# Patient Record
Sex: MALE | Race: WHITE | NOT HISPANIC OR LATINO | Employment: OTHER | URBAN - METROPOLITAN AREA
[De-identification: names, ages, dates, MRNs, and addresses within clinical notes are randomized per-mention and may not be internally consistent; named-entity substitution may affect disease eponyms.]

---

## 2017-02-14 RX ORDER — SIMVASTATIN 40 MG
TABLET ORAL
Qty: 90 TABLET | Refills: 0 | Status: SHIPPED | OUTPATIENT
Start: 2017-02-14 | End: 2017-05-12 | Stop reason: SDUPTHER

## 2017-02-22 DIAGNOSIS — I10 HTN (HYPERTENSION), BENIGN: ICD-10-CM

## 2017-02-22 RX ORDER — HYDROCHLOROTHIAZIDE 12.5 MG/1
TABLET ORAL
Qty: 90 TABLET | Refills: 0 | Status: SHIPPED | OUTPATIENT
Start: 2017-02-22 | End: 2017-05-21 | Stop reason: SDUPTHER

## 2017-02-22 RX ORDER — LOSARTAN POTASSIUM 100 MG/1
TABLET ORAL
Qty: 90 TABLET | Refills: 0 | Status: SHIPPED | OUTPATIENT
Start: 2017-02-22 | End: 2017-05-21 | Stop reason: SDUPTHER

## 2017-03-20 ENCOUNTER — OFFICE VISIT (OUTPATIENT)
Dept: INTERNAL MEDICINE | Age: 67
End: 2017-03-20

## 2017-03-20 VITALS
HEART RATE: 72 BPM | HEIGHT: 70 IN | BODY MASS INDEX: 26.92 KG/M2 | RESPIRATION RATE: 12 BRPM | SYSTOLIC BLOOD PRESSURE: 120 MMHG | DIASTOLIC BLOOD PRESSURE: 76 MMHG | WEIGHT: 188 LBS

## 2017-03-20 DIAGNOSIS — E11.9 TYPE 2 DIABETES MELLITUS WITHOUT COMPLICATION, WITHOUT LONG-TERM CURRENT USE OF INSULIN (HCC): ICD-10-CM

## 2017-03-20 DIAGNOSIS — E78.2 MIXED HYPERLIPIDEMIA: ICD-10-CM

## 2017-03-20 DIAGNOSIS — I10 BENIGN ESSENTIAL HYPERTENSION: Primary | ICD-10-CM

## 2017-03-20 LAB
ALBUMIN SERPL-MCNC: 4.6 G/DL (ref 3.5–5.2)
ALBUMIN/GLOB SERPL: 2.1 G/DL
ALP SERPL-CCNC: 66 U/L (ref 39–117)
ALT SERPL-CCNC: 18 U/L (ref 1–41)
AST SERPL-CCNC: 20 U/L (ref 1–40)
BILIRUB SERPL-MCNC: 0.3 MG/DL (ref 0.1–1.2)
BUN SERPL-MCNC: 20 MG/DL (ref 8–23)
BUN/CREAT SERPL: 20.2 (ref 7–25)
CALCIUM SERPL-MCNC: 10.1 MG/DL (ref 8.6–10.5)
CHLORIDE SERPL-SCNC: 103 MMOL/L (ref 98–107)
CHOLEST SERPL-MCNC: 190 MG/DL (ref 0–200)
CO2 SERPL-SCNC: 23.5 MMOL/L (ref 22–29)
CREAT SERPL-MCNC: 0.99 MG/DL (ref 0.76–1.27)
GLOBULIN SER CALC-MCNC: 2.2 GM/DL
GLUCOSE SERPL-MCNC: 115 MG/DL (ref 65–99)
HBA1C MFR BLD: 5.9 % (ref 4.8–5.6)
HDLC SERPL-MCNC: 53 MG/DL (ref 40–60)
LDLC SERPL CALC-MCNC: 117 MG/DL (ref 0–100)
POTASSIUM SERPL-SCNC: 4.3 MMOL/L (ref 3.5–5.2)
PROT SERPL-MCNC: 6.8 G/DL (ref 6–8.5)
SODIUM SERPL-SCNC: 142 MMOL/L (ref 136–145)
TRIGL SERPL-MCNC: 102 MG/DL (ref 0–150)
VLDLC SERPL CALC-MCNC: 20.4 MG/DL (ref 5–40)

## 2017-03-20 PROCEDURE — 99214 OFFICE O/P EST MOD 30 MIN: CPT | Performed by: INTERNAL MEDICINE

## 2017-03-20 NOTE — PROGRESS NOTES
Jeremi Shankar  is a 66 y.o. male who presents with   Chief Complaint   Patient presents with   • Hypertension     Checkup; lab updates   • Hyperlipidemia     As above   • Diabetes     As above   • Hands tingly     Unrelated reason for today's visit: Both hands feel tingly.   .    Hypertension   This is a chronic problem. The current episode started more than 1 year ago. The problem is controlled. Past treatments include diuretics and angiotensin blockers. There are no compliance problems.    Hyperlipidemia   This is a chronic problem. The current episode started more than 1 year ago. The problem is controlled. Recent lipid tests were reviewed and are normal. Current antihyperlipidemic treatment includes statins. The current treatment provides moderate improvement of lipids. There are no compliance problems.    Diabetes   He presents for his follow-up diabetic visit. He has type 2 diabetes mellitus. His disease course has been stable. There are no hypoglycemic complications. Symptoms are stable. There are no diabetic complications. When asked about current treatments, none were reported.      Hands tingly: This is in both hands.  At this point in time he does not wish to pursue a workup.    The following portions of the patient's history were reviewed and updated as appropriate: past family history, past social history and past surgical history.    Review of Systems   Constitutional: Negative.    HENT: Negative.    Eyes: Negative.    Respiratory: Negative.    Cardiovascular: Negative.    Genitourinary: Negative.    Musculoskeletal: Negative.    Skin: Negative.    Neurological:        Subjective complaining of tingling in both hands as noted.   Psychiatric/Behavioral: Negative.        Objective   Physical Exam   Constitutional: He is oriented to person, place, and time. He appears well-developed and well-nourished. No distress.   HENT:   Head: Normocephalic and atraumatic.   Eyes: Conjunctivae and EOM are  normal. Pupils are equal, round, and reactive to light.   Neck: Normal range of motion. Neck supple. No thyromegaly present.   Neck exam negative.  Carotid auscultation normal-no bruits heard.   Cardiovascular: Normal rate, regular rhythm, normal heart sounds and intact distal pulses.  Exam reveals no gallop and no friction rub.    No murmur heard.  Pulmonary/Chest: Effort normal and breath sounds normal. No respiratory distress. He has no wheezes. He has no rales. He exhibits no tenderness.   Neurological: He is alert and oriented to person, place, and time. He has normal reflexes. He displays normal reflexes. No cranial nerve deficit. Coordination normal.   Psychiatric: He has a normal mood and affect. His behavior is normal. Judgment and thought content normal.   Nursing note and vitals reviewed.      Assessment/Plan   Jeremi was seen today for hypertension, hyperlipidemia, diabetes and hands tingly.    Diagnoses and all orders for this visit:    Benign essential hypertension  -     Comprehensive Metabolic Panel    Mixed hyperlipidemia  -     Comprehensive Metabolic Panel  -     Lipid Panel    Type 2 diabetes mellitus without complication, without long-term current use of insulin  -     Comprehensive Metabolic Panel  -     Hemoglobin A1c      Plan: Labs as above.Today's exam unremarkable for any new problems or events.  Continue all current treatment as prescribed.  A follow-up checkup/lab update visit is advised for 6 months.    Hands tingly: Possible carpal tunnel syndrome based on his history alone.  We will try vitamin B2-2 every morning and 2 every evening; vitamin B6-1 every morning and one every evening.  EMG advised if symptoms persist.

## 2017-05-12 RX ORDER — SIMVASTATIN 40 MG
TABLET ORAL
Qty: 90 TABLET | Refills: 0 | Status: SHIPPED | OUTPATIENT
Start: 2017-05-12 | End: 2017-08-09 | Stop reason: SDUPTHER

## 2017-05-21 DIAGNOSIS — I10 HTN (HYPERTENSION), BENIGN: ICD-10-CM

## 2017-05-22 RX ORDER — LOSARTAN POTASSIUM 100 MG/1
TABLET ORAL
Qty: 90 TABLET | Refills: 0 | Status: SHIPPED | OUTPATIENT
Start: 2017-05-22 | End: 2017-08-19 | Stop reason: SDUPTHER

## 2017-05-22 RX ORDER — HYDROCHLOROTHIAZIDE 12.5 MG/1
TABLET ORAL
Qty: 90 TABLET | Refills: 0 | Status: SHIPPED | OUTPATIENT
Start: 2017-05-22 | End: 2017-08-19 | Stop reason: SDUPTHER

## 2017-08-09 RX ORDER — SIMVASTATIN 40 MG
TABLET ORAL
Qty: 90 TABLET | Refills: 0 | Status: SHIPPED | OUTPATIENT
Start: 2017-08-09 | End: 2017-11-06 | Stop reason: SDUPTHER

## 2017-08-19 DIAGNOSIS — I10 HTN (HYPERTENSION), BENIGN: ICD-10-CM

## 2017-08-21 RX ORDER — HYDROCHLOROTHIAZIDE 12.5 MG/1
TABLET ORAL
Qty: 90 TABLET | Refills: 0 | Status: SHIPPED | OUTPATIENT
Start: 2017-08-21 | End: 2017-11-17 | Stop reason: SDUPTHER

## 2017-08-21 RX ORDER — LOSARTAN POTASSIUM 100 MG/1
TABLET ORAL
Qty: 90 TABLET | Refills: 0 | Status: SHIPPED | OUTPATIENT
Start: 2017-08-21 | End: 2017-11-17 | Stop reason: SDUPTHER

## 2017-09-20 ENCOUNTER — OFFICE VISIT (OUTPATIENT)
Dept: INTERNAL MEDICINE | Age: 67
End: 2017-09-20

## 2017-09-20 VITALS
WEIGHT: 186 LBS | DIASTOLIC BLOOD PRESSURE: 68 MMHG | RESPIRATION RATE: 12 BRPM | HEIGHT: 70 IN | BODY MASS INDEX: 26.63 KG/M2 | HEART RATE: 72 BPM | SYSTOLIC BLOOD PRESSURE: 130 MMHG

## 2017-09-20 DIAGNOSIS — E78.2 MIXED HYPERLIPIDEMIA: ICD-10-CM

## 2017-09-20 DIAGNOSIS — R35.1 NOCTURIA: ICD-10-CM

## 2017-09-20 DIAGNOSIS — IMO0001 PARESTHESIAS/NUMBNESS: ICD-10-CM

## 2017-09-20 DIAGNOSIS — I10 BENIGN ESSENTIAL HYPERTENSION: Primary | ICD-10-CM

## 2017-09-20 DIAGNOSIS — E08.42 DIABETES MELLITUS DUE TO UNDERLYING CONDITION WITH DIABETIC POLYNEUROPATHY, WITHOUT LONG-TERM CURRENT USE OF INSULIN (HCC): ICD-10-CM

## 2017-09-20 DIAGNOSIS — R73.9 HYPERGLYCEMIA: ICD-10-CM

## 2017-09-20 LAB
ALBUMIN SERPL-MCNC: 4.8 G/DL (ref 3.5–5.2)
ALBUMIN/GLOB SERPL: 2.2 G/DL
ALP SERPL-CCNC: 65 U/L (ref 39–117)
ALT SERPL-CCNC: 26 U/L (ref 1–41)
AST SERPL-CCNC: 27 U/L (ref 1–40)
BILIRUB SERPL-MCNC: 0.5 MG/DL (ref 0.1–1.2)
BUN SERPL-MCNC: 20 MG/DL (ref 8–23)
BUN/CREAT SERPL: 19 (ref 7–25)
CALCIUM SERPL-MCNC: 10.1 MG/DL (ref 8.6–10.5)
CHLORIDE SERPL-SCNC: 99 MMOL/L (ref 98–107)
CHOLEST SERPL-MCNC: 193 MG/DL (ref 0–200)
CO2 SERPL-SCNC: 27.5 MMOL/L (ref 22–29)
CREAT SERPL-MCNC: 1.05 MG/DL (ref 0.76–1.27)
GLOBULIN SER CALC-MCNC: 2.2 GM/DL
GLUCOSE SERPL-MCNC: 109 MG/DL (ref 65–99)
HBA1C MFR BLD: 5.9 % (ref 4.8–5.6)
HDLC SERPL-MCNC: 52 MG/DL (ref 40–60)
LDLC SERPL CALC-MCNC: 111 MG/DL (ref 0–100)
POTASSIUM SERPL-SCNC: 4.2 MMOL/L (ref 3.5–5.2)
PROT SERPL-MCNC: 7 G/DL (ref 6–8.5)
PSA SERPL-MCNC: 1.03 NG/ML (ref 0–4)
SODIUM SERPL-SCNC: 140 MMOL/L (ref 136–145)
TRIGL SERPL-MCNC: 149 MG/DL (ref 0–150)
VLDLC SERPL CALC-MCNC: 29.8 MG/DL (ref 5–40)

## 2017-09-20 PROCEDURE — 99214 OFFICE O/P EST MOD 30 MIN: CPT | Performed by: INTERNAL MEDICINE

## 2017-09-20 NOTE — PROGRESS NOTES
Jeremi Shankar  is a 66 y.o. male who presents with   Chief Complaint   Patient presents with   • Hypertension     Checkup; lab updates   • Hyperlipidemia     As above   • Blood Sugar Problem     Blood sugar 115; A1c March 2017.  Currently on no blood sugar medications.   • Nocturia     Unrelated reason for today's visit: Nighttime urinary frequency with slow stream.  No fever, chills or dysuria.   • Numb/tingly hands     Unrelated reason for today's visit: Number/tingly hands for the past year.  He says his hands are sore and he gets tingling in his hands with usage or while asleep.   .    Hypertension   This is a chronic problem. The current episode started more than 1 year ago. The problem is controlled. Past treatments include diuretics and angiotensin blockers. The current treatment provides moderate improvement. There are no compliance problems.    Hyperlipidemia   This is a chronic problem. The current episode started more than 1 year ago. The problem is controlled. Exacerbating diseases include diabetes. Current antihyperlipidemic treatment includes statins. The current treatment provides moderate improvement of lipids. There are no compliance problems.    Blood Sugar Problem   This is a chronic problem. The current episode started more than 1 year ago. The problem has been waxing and waning. Associated symptoms include joint swelling and numbness. He has tried nothing for the symptoms.        The following portions of the patient's history were reviewed and updated as appropriate: allergies, current medications, past medical history and problem list.    Review of Systems   Constitutional: Negative.    HENT: Negative.    Eyes: Negative.    Respiratory: Negative.    Cardiovascular: Negative.    Genitourinary: Negative.    Musculoskeletal: Positive for joint swelling.        Joint swelling of the knuckles of his hands suggesting osteoarthritis.   Skin: Negative.    Neurological: Positive for  numbness.        Tingling of hands as per history.   Psychiatric/Behavioral: Negative.        Objective   Physical Exam   Constitutional: He is oriented to person, place, and time. He appears well-developed and well-nourished. No distress.   HENT:   Head: Normocephalic and atraumatic.   Eyes: Conjunctivae and EOM are normal. Pupils are equal, round, and reactive to light.   Neck: Normal range of motion. Neck supple. No thyromegaly present.   Neck exam negative.  Carotid auscultation normal-no bruits heard.   Cardiovascular: Normal rate, regular rhythm, normal heart sounds and intact distal pulses.  Exam reveals no gallop and no friction rub.    No murmur heard.  Pulmonary/Chest: Effort normal and breath sounds normal. No respiratory distress. He has no wheezes. He has no rales. He exhibits no tenderness.   Musculoskeletal:   Patient with bilateral Heberden's and Jim's nodes of his hands suggesting the likelihood of osteoarthritis.  He also has bilateral Dupuytren's contractures of both ring fingers that appears to be mild at present time.   Neurological: He is alert and oriented to person, place, and time.   The patient has positive Tinel's and Phalen's signs at both wrists suggesting the possibility of carpal tunnel syndrome bilaterally.   Psychiatric: He has a normal mood and affect. His behavior is normal. Judgment and thought content normal.   Nursing note and vitals reviewed.      Assessment/Plan   Jeremi was seen today for hypertension, hyperlipidemia, blood sugar problem, nocturia and numb/tingly hands.    Diagnoses and all orders for this visit:    Benign essential hypertension  -     Comprehensive Metabolic Panel    Mixed hyperlipidemia  -     Comprehensive Metabolic Panel  -     Lipid Panel    Hyperglycemia  -     Comprehensive Metabolic Panel  -     Hemoglobin A1c    Nocturia  -     PSA    Paresthesias/numbness  -     EMG 2 Limbs; Future    Diabetes mellitus due to underlying condition with diabetic  "polyneuropathy, without long-term current use of insulin   -     EMG 2 Limbs; Future      Plan: Labs as above for the issues as outlined.  Continue current treatment.  Tentative plans for a six-month checkup/lab update visit.    Paresthesias-hands: Possible carpal tunnel.  We'll schedule EMG.  Patient does have a history of hyperglycemia and borderline diabetes (\"pre-diabetes \").  Also bilateral Dupuytren's contractures as noted.  He will try vitamin B2 and B6-2 B-2 in the morning and evening and one B 6 in the morning and evening.           "

## 2017-09-28 ENCOUNTER — HOSPITAL ENCOUNTER (OUTPATIENT)
Dept: INFUSION THERAPY | Facility: HOSPITAL | Age: 67
Discharge: HOME | End: 2017-09-28
Attending: PSYCHIATRY & NEUROLOGY | Admitting: PSYCHIATRY & NEUROLOGY

## 2017-09-28 DIAGNOSIS — E08.42 DIABETES MELLITUS DUE TO UNDERLYING CONDITION WITH DIABETIC POLYNEUROPATHY, WITHOUT LONG-TERM CURRENT USE OF INSULIN (HCC): ICD-10-CM

## 2017-09-28 DIAGNOSIS — IMO0001 PARESTHESIAS/NUMBNESS: ICD-10-CM

## 2017-09-28 PROCEDURE — 95910 NRV CNDJ TEST 7-8 STUDIES: CPT | Performed by: PSYCHIATRY & NEUROLOGY

## 2017-09-28 PROCEDURE — 95910 NRV CNDJ TEST 7-8 STUDIES: CPT

## 2017-11-06 RX ORDER — SIMVASTATIN 40 MG
TABLET ORAL
Qty: 90 TABLET | Refills: 1 | Status: SHIPPED | OUTPATIENT
Start: 2017-11-06 | End: 2018-05-05 | Stop reason: SDUPTHER

## 2017-11-17 DIAGNOSIS — I10 HTN (HYPERTENSION), BENIGN: ICD-10-CM

## 2017-11-17 RX ORDER — LOSARTAN POTASSIUM 100 MG/1
TABLET ORAL
Qty: 90 TABLET | Refills: 0 | Status: SHIPPED | OUTPATIENT
Start: 2017-11-17 | End: 2018-02-13 | Stop reason: SDUPTHER

## 2017-11-17 RX ORDER — HYDROCHLOROTHIAZIDE 12.5 MG/1
TABLET ORAL
Qty: 90 TABLET | Refills: 0 | Status: SHIPPED | OUTPATIENT
Start: 2017-11-17 | End: 2018-02-13 | Stop reason: SDUPTHER

## 2018-02-13 DIAGNOSIS — I10 HTN (HYPERTENSION), BENIGN: ICD-10-CM

## 2018-02-13 RX ORDER — LOSARTAN POTASSIUM 100 MG/1
TABLET ORAL
Qty: 90 TABLET | Refills: 0 | Status: SHIPPED | OUTPATIENT
Start: 2018-02-13 | End: 2018-05-11 | Stop reason: SDUPTHER

## 2018-02-13 RX ORDER — HYDROCHLOROTHIAZIDE 12.5 MG/1
TABLET ORAL
Qty: 90 TABLET | Refills: 0 | Status: SHIPPED | OUTPATIENT
Start: 2018-02-13 | End: 2018-05-11 | Stop reason: SDUPTHER

## 2018-02-27 ENCOUNTER — TELEPHONE (OUTPATIENT)
Dept: INTERNAL MEDICINE | Age: 68
End: 2018-02-27

## 2018-02-27 NOTE — TELEPHONE ENCOUNTER
"Generally speaking if the \"cyst has pus in it\" than before anybody removes it and antibiotic generally is needed to clear up any infection that might be there.  Under the circumstances it probably would be best if I see at first before making a referral.  "

## 2018-02-27 NOTE — TELEPHONE ENCOUNTER
Pt called asking for a referral to dermatologist. Pt said he brought it to Dr Jacobs's attention about a year ago, of a bump/ cyst on back that has pus in it and he wants to have it removed. Pt asked if he would refer him, or does he need to be seen again?  Pt's # [phone]  Thanks SP

## 2018-02-27 NOTE — TELEPHONE ENCOUNTER
Pt called back & stated he will wait until his 6 months appt that is scheduled for Tuesday 3/13/18 to have the cyst evaluated.

## 2018-02-28 ENCOUNTER — OFFICE VISIT (OUTPATIENT)
Dept: INTERNAL MEDICINE | Age: 68
End: 2018-02-28

## 2018-02-28 VITALS
RESPIRATION RATE: 13 BRPM | WEIGHT: 189 LBS | BODY MASS INDEX: 27.06 KG/M2 | TEMPERATURE: 97 F | HEART RATE: 70 BPM | HEIGHT: 70 IN | OXYGEN SATURATION: 96 % | DIASTOLIC BLOOD PRESSURE: 70 MMHG | SYSTOLIC BLOOD PRESSURE: 120 MMHG

## 2018-02-28 DIAGNOSIS — I10 BENIGN ESSENTIAL HYPERTENSION: Primary | ICD-10-CM

## 2018-02-28 DIAGNOSIS — E78.2 MIXED HYPERLIPIDEMIA: ICD-10-CM

## 2018-02-28 DIAGNOSIS — L72.3 SEBACEOUS CYST: ICD-10-CM

## 2018-02-28 DIAGNOSIS — R73.9 HYPERGLYCEMIA: ICD-10-CM

## 2018-02-28 PROCEDURE — 99214 OFFICE O/P EST MOD 30 MIN: CPT | Performed by: INTERNAL MEDICINE

## 2018-02-28 NOTE — PROGRESS NOTES
"  Jeremi Shankar . is a 67 y.o. male who presents with   Chief Complaint   Patient presents with   • Hypertension     Checkup; lab updates   • Hyperlipidemia     As above   • Blood Sugar Problem     As above.  Currently on no oral hypoglycemic agents.   • \" Cyst on my back\"     As noted.  1.5 cm sebaceous cyst on the left lower back in the lumbar area.   .    Hypertension   This is a chronic problem. The current episode started more than 1 year ago. The problem is controlled. Past treatments include diuretics and angiotensin blockers. The current treatment provides moderate improvement. There are no compliance problems.    Hyperlipidemia   This is a chronic problem. The current episode started more than 1 year ago. The problem is controlled. Exacerbating diseases include diabetes. Current antihyperlipidemic treatment includes statins. The current treatment provides moderate improvement of lipids. There are no compliance problems.    Blood Sugar Problem   This is a chronic problem. The current episode started more than 1 year ago. The problem has been waxing and waning. He has tried nothing for the symptoms.      \" Cyst on my back\": History as outlined above.    The following portions of the patient's history were reviewed and updated as appropriate: allergies, current medications, past medical history and problem list.    Review of Systems   Constitutional: Negative.    HENT: Negative.    Eyes: Negative.    Respiratory: Negative.    Cardiovascular: Negative.    Genitourinary: Negative.    Musculoskeletal: Negative.    Skin: Negative.         Cyst on the lower back as noted from the history   Neurological: Negative.    Psychiatric/Behavioral: Negative.        Objective   Physical Exam   Constitutional: He is oriented to person, place, and time. He appears well-developed and well-nourished. No distress.   HENT:   Head: Normocephalic and atraumatic.   Eyes: Conjunctivae and EOM are normal. Pupils are equal, " "round, and reactive to light.   Neck: Normal range of motion. Neck supple. No thyromegaly present.   Neck exam negative.  Carotid auscultation normal-no bruits heard.   Cardiovascular: Normal rate, regular rhythm, normal heart sounds and intact distal pulses.  Exam reveals no gallop and no friction rub.    No murmur heard.  Pulmonary/Chest: Effort normal and breath sounds normal. No respiratory distress. He has no wheezes. He has no rales. He exhibits no tenderness.   Neurological: He is alert and oriented to person, place, and time.   Skin:   1.5 cm noninfected sebaceous cyst of the left lower back/lumbar area   Psychiatric: He has a normal mood and affect. His behavior is normal. Judgment and thought content normal.   Nursing note and vitals reviewed.      Assessment/Plan   Jeremi was seen today for hypertension, hyperlipidemia, blood sugar problem and \" cyst on my back\".    Diagnoses and all orders for this visit:    Benign essential hypertension  -     Comprehensive Metabolic Panel    Mixed hyperlipidemia  -     Comprehensive Metabolic Panel  -     Lipid Panel    Hyperglycemia  -     Comprehensive Metabolic Panel  -     Hemoglobin A1c    Sebaceous cyst  -     Ambulatory Referral to General Surgery      Plan: Labs as above for the issues as outlined.  Tentatively we'll plan on a six-month checkup/lab update visit.    Surgical referral to Dr. Ye Negron.  For removal of cyst per request of the patient to have it removed.         "

## 2018-03-01 LAB
ALBUMIN SERPL-MCNC: 4.9 G/DL (ref 3.5–5.2)
ALBUMIN/GLOB SERPL: 1.8 G/DL
ALP SERPL-CCNC: 71 U/L (ref 39–117)
ALT SERPL-CCNC: 20 U/L (ref 1–41)
AST SERPL-CCNC: 24 U/L (ref 1–40)
BILIRUB SERPL-MCNC: 0.5 MG/DL (ref 0.1–1.2)
BUN SERPL-MCNC: 18 MG/DL (ref 8–23)
BUN/CREAT SERPL: 18.4 (ref 7–25)
CALCIUM SERPL-MCNC: 10 MG/DL (ref 8.6–10.5)
CHLORIDE SERPL-SCNC: 99 MMOL/L (ref 98–107)
CHOLEST SERPL-MCNC: 180 MG/DL (ref 0–200)
CO2 SERPL-SCNC: 27.8 MMOL/L (ref 22–29)
CREAT SERPL-MCNC: 0.98 MG/DL (ref 0.76–1.27)
GFR SERPLBLD CREATININE-BSD FMLA CKD-EPI: 76 ML/MIN/1.73
GFR SERPLBLD CREATININE-BSD FMLA CKD-EPI: 92 ML/MIN/1.73
GLOBULIN SER CALC-MCNC: 2.7 GM/DL
GLUCOSE SERPL-MCNC: 98 MG/DL (ref 65–99)
HBA1C MFR BLD: 5.8 % (ref 4.8–5.6)
HDLC SERPL-MCNC: 57 MG/DL (ref 40–60)
LDLC SERPL CALC-MCNC: 100 MG/DL (ref 0–100)
POTASSIUM SERPL-SCNC: 4.5 MMOL/L (ref 3.5–5.2)
PROT SERPL-MCNC: 7.6 G/DL (ref 6–8.5)
SODIUM SERPL-SCNC: 142 MMOL/L (ref 136–145)
TRIGL SERPL-MCNC: 114 MG/DL (ref 0–150)
VLDLC SERPL CALC-MCNC: 22.8 MG/DL (ref 5–40)

## 2018-04-16 ENCOUNTER — TELEPHONE (OUTPATIENT)
Dept: INTERNAL MEDICINE | Age: 68
End: 2018-04-16

## 2018-04-16 RX ORDER — HYDROCORTISONE ACETATE 25 MG/1
25 SUPPOSITORY RECTAL 2 TIMES DAILY PRN
Qty: 12 EACH | Refills: 1 | Status: SHIPPED | OUTPATIENT
Start: 2018-04-16 | End: 2018-08-28

## 2018-04-16 RX ORDER — HYDROCORTISONE ACETATE 25 MG/1
25 SUPPOSITORY RECTAL 2 TIMES DAILY PRN
Qty: 12 EACH | Refills: 1 | Status: SHIPPED | OUTPATIENT
Start: 2018-04-16 | End: 2018-04-16

## 2018-04-16 RX ORDER — HYDROCORTISONE ACETATE 25 MG/1
25 SUPPOSITORY RECTAL 2 TIMES DAILY PRN
Qty: 12 EACH | Refills: 1 | Status: SHIPPED | OUTPATIENT
Start: 2018-04-16 | End: 2018-04-16 | Stop reason: SDUPTHER

## 2018-04-16 NOTE — TELEPHONE ENCOUNTER
Patient thinks he is having problems with Hemorids and would like for you to call him back. He would not tell me anymore than that.

## 2018-04-16 NOTE — TELEPHONE ENCOUNTER
Sent him a prescription for hydrocortisone rectal suppositories 25 mg number 10-1 refill.  Insert 1 twice a day into the rectum.

## 2018-04-16 NOTE — TELEPHONE ENCOUNTER
Pt called back to let us know that the suppositories are not covered under his insurance they said we could send in proctosol cream is it ok to change to this?

## 2018-04-17 ENCOUNTER — TELEPHONE (OUTPATIENT)
Dept: INTERNAL MEDICINE | Age: 68
End: 2018-04-17

## 2018-04-17 NOTE — TELEPHONE ENCOUNTER
PLEASE SEE NOTE BELOW AND ADVISE. LAST MEDICATION CALLED IN WAS NOT COVERED BY PATIENTS INSURANCE.

## 2018-04-17 NOTE — TELEPHONE ENCOUNTER
Pt called stating his [facility] pharmacy had not received the new medication for hemorrhoid Proctosol cream.  Pt's # [phone]  Thanks SP

## 2018-05-07 RX ORDER — SIMVASTATIN 40 MG
TABLET ORAL
Qty: 90 TABLET | Refills: 1 | Status: SHIPPED | OUTPATIENT
Start: 2018-05-07 | End: 2018-10-28 | Stop reason: SDUPTHER

## 2018-05-11 DIAGNOSIS — I10 HTN (HYPERTENSION), BENIGN: ICD-10-CM

## 2018-05-11 RX ORDER — LOSARTAN POTASSIUM 100 MG/1
TABLET ORAL
Qty: 90 TABLET | Refills: 0 | Status: SHIPPED | OUTPATIENT
Start: 2018-05-11 | End: 2018-08-10 | Stop reason: SDUPTHER

## 2018-05-11 RX ORDER — HYDROCHLOROTHIAZIDE 12.5 MG/1
TABLET ORAL
Qty: 90 TABLET | Refills: 0 | Status: SHIPPED | OUTPATIENT
Start: 2018-05-11 | End: 2018-08-10 | Stop reason: SDUPTHER

## 2018-08-10 DIAGNOSIS — I10 HTN (HYPERTENSION), BENIGN: ICD-10-CM

## 2018-08-10 RX ORDER — LOSARTAN POTASSIUM 100 MG/1
TABLET ORAL
Qty: 90 TABLET | Refills: 0 | Status: SHIPPED | OUTPATIENT
Start: 2018-08-10 | End: 2018-09-24 | Stop reason: SDUPTHER

## 2018-08-10 RX ORDER — HYDROCHLOROTHIAZIDE 12.5 MG/1
TABLET ORAL
Qty: 90 TABLET | Refills: 0 | Status: SHIPPED | OUTPATIENT
Start: 2018-08-10 | End: 2018-09-24 | Stop reason: SDUPTHER

## 2018-08-28 ENCOUNTER — OFFICE VISIT (OUTPATIENT)
Dept: INTERNAL MEDICINE | Age: 68
End: 2018-08-28

## 2018-08-28 VITALS
WEIGHT: 186 LBS | TEMPERATURE: 98.5 F | RESPIRATION RATE: 12 BRPM | SYSTOLIC BLOOD PRESSURE: 118 MMHG | BODY MASS INDEX: 26.63 KG/M2 | HEIGHT: 70 IN | DIASTOLIC BLOOD PRESSURE: 60 MMHG | OXYGEN SATURATION: 96 % | HEART RATE: 65 BPM

## 2018-08-28 DIAGNOSIS — I10 BENIGN ESSENTIAL HYPERTENSION: Primary | ICD-10-CM

## 2018-08-28 DIAGNOSIS — E11.9 TYPE 2 DIABETES MELLITUS WITHOUT COMPLICATION, WITHOUT LONG-TERM CURRENT USE OF INSULIN (HCC): ICD-10-CM

## 2018-08-28 DIAGNOSIS — E78.2 MIXED HYPERLIPIDEMIA: ICD-10-CM

## 2018-08-28 LAB
ALBUMIN SERPL-MCNC: 4.7 G/DL (ref 3.5–5.2)
ALBUMIN/GLOB SERPL: 1.7 G/DL
ALP SERPL-CCNC: 73 U/L (ref 39–117)
ALT SERPL-CCNC: 19 U/L (ref 1–41)
AST SERPL-CCNC: 23 U/L (ref 1–40)
BILIRUB SERPL-MCNC: 0.6 MG/DL (ref 0.1–1.2)
BUN SERPL-MCNC: 17 MG/DL (ref 8–23)
BUN/CREAT SERPL: 16.8 (ref 7–25)
CALCIUM SERPL-MCNC: 9.9 MG/DL (ref 8.6–10.5)
CHLORIDE SERPL-SCNC: 101 MMOL/L (ref 98–107)
CHOLEST SERPL-MCNC: 175 MG/DL (ref 0–200)
CO2 SERPL-SCNC: 26.6 MMOL/L (ref 22–29)
CREAT SERPL-MCNC: 1.01 MG/DL (ref 0.76–1.27)
GLOBULIN SER CALC-MCNC: 2.8 GM/DL
GLUCOSE SERPL-MCNC: 102 MG/DL (ref 65–99)
HBA1C MFR BLD: 5.84 % (ref 4.8–5.6)
HDLC SERPL-MCNC: 48 MG/DL (ref 40–60)
LDLC SERPL CALC-MCNC: 98 MG/DL (ref 0–100)
POTASSIUM SERPL-SCNC: 4.3 MMOL/L (ref 3.5–5.2)
PROT SERPL-MCNC: 7.5 G/DL (ref 6–8.5)
SODIUM SERPL-SCNC: 141 MMOL/L (ref 136–145)
TRIGL SERPL-MCNC: 147 MG/DL (ref 0–150)
VLDLC SERPL CALC-MCNC: 29.4 MG/DL (ref 5–40)

## 2018-08-28 PROCEDURE — 99214 OFFICE O/P EST MOD 30 MIN: CPT | Performed by: INTERNAL MEDICINE

## 2018-08-28 NOTE — PROGRESS NOTES
"  Jeremi Shankar . is a 67 y.o. male who presents with   Chief Complaint   Patient presents with   • Hypertension   • Hyperlipidemia   • Diabetes   .    67-year-old male.  Six-month checkup/lab update visit.  No complaints on today's visit other than \"arthritis of the hands and carpal tunnel syndrome\".  He said he would \"deal with it\".      Hypertension   This is a chronic problem. The current episode started more than 1 year ago. The problem is controlled. Current antihypertension treatment includes diuretics and angiotensin blockers. The current treatment provides moderate improvement. There are no compliance problems.    Hyperlipidemia   This is a chronic problem. The current episode started more than 1 year ago. The problem is controlled. Recent lipid tests were reviewed and are normal. Current antihyperlipidemic treatment includes statins. The current treatment provides moderate improvement of lipids. There are no compliance problems.    Diabetes   He presents for his follow-up diabetic visit. He has type 2 diabetes mellitus. His disease course has been stable. There are no diabetic associated symptoms. There are no hypoglycemic complications. Symptoms are stable. There are no diabetic complications. Current diabetic treatment includes diet. An ACE inhibitor/angiotensin II receptor blocker is being taken.        The following portions of the patient's history were reviewed and updated as appropriate: allergies, current medications, past medical history and problem list.    Review of Systems   Constitutional: Negative.    HENT: Negative.    Eyes: Negative.    Respiratory: Negative.    Cardiovascular: Negative.    Genitourinary: Negative.    Musculoskeletal: Negative.    Skin: Negative.    Neurological: Negative.    Psychiatric/Behavioral: Negative.        Objective   Physical Exam   Constitutional: He is oriented to person, place, and time. He appears well-developed and well-nourished. No distress. "   HENT:   Head: Normocephalic and atraumatic.   Eyes: Pupils are equal, round, and reactive to light. Conjunctivae and EOM are normal.   Neck: Normal range of motion. Neck supple. No thyromegaly present.   Neck exam negative.  Carotid auscultation normal-no bruits heard.   Cardiovascular: Normal rate, regular rhythm, normal heart sounds and intact distal pulses.  Exam reveals no gallop and no friction rub.    No murmur heard.  Pulmonary/Chest: Effort normal and breath sounds normal. No respiratory distress. He has no wheezes. He has no rales. He exhibits no tenderness.   Neurological: He is alert and oriented to person, place, and time.   Psychiatric: He has a normal mood and affect. His behavior is normal. Judgment and thought content normal.   Nursing note and vitals reviewed.      Assessment/Plan   Jeremi was seen today for hypertension, hyperlipidemia and diabetes.    Diagnoses and all orders for this visit:    Benign essential hypertension  -     Comprehensive Metabolic Panel    Mixed hyperlipidemia  -     Comprehensive Metabolic Panel  -     Lipid Panel    Type 2 diabetes mellitus without complication, without long-term current use of insulin (CMS/[facility])  -     Comprehensive Metabolic Panel  -     Hemoglobin A1c        Plan: Labs as above.Today's exam unremarkable for any new problems or events.  Continue all current treatment as prescribed.  A follow-up checkup/lab update visit is advised for 6 months assuming today's labs are all acceptable.

## 2018-09-24 ENCOUNTER — TELEPHONE (OUTPATIENT)
Dept: INTERNAL MEDICINE | Age: 68
End: 2018-09-24

## 2018-09-24 DIAGNOSIS — I10 HTN (HYPERTENSION), BENIGN: ICD-10-CM

## 2018-09-24 RX ORDER — HYDROCHLOROTHIAZIDE 12.5 MG/1
12.5 TABLET ORAL DAILY
Qty: 90 TABLET | Refills: 0 | Status: SHIPPED | OUTPATIENT
Start: 2018-09-24 | End: 2019-03-21 | Stop reason: SDUPTHER

## 2018-09-24 RX ORDER — LOSARTAN POTASSIUM 100 MG/1
100 TABLET ORAL DAILY
Qty: 90 TABLET | Refills: 0 | Status: SHIPPED | OUTPATIENT
Start: 2018-09-24 | End: 2019-01-18

## 2018-09-24 NOTE — TELEPHONE ENCOUNTER
Pt called requesting refills on his Losartan 90 day supply and Hydrochlorothiazide 90 day supply. Both have no refills and pt does have a f/u appt scheduled.  Pt's # [phone]  [facility] pharmacy  Thanks FRED

## 2018-10-29 RX ORDER — SIMVASTATIN 40 MG
TABLET ORAL
Qty: 90 TABLET | Refills: 1 | Status: SHIPPED | OUTPATIENT
Start: 2018-10-29 | End: 2019-04-25 | Stop reason: SDUPTHER

## 2019-02-28 ENCOUNTER — HOSPITAL ENCOUNTER (OUTPATIENT)
Dept: GENERAL RADIOLOGY | Facility: HOSPITAL | Age: 69
Discharge: HOME | End: 2019-02-28
Admitting: INTERNAL MEDICINE

## 2019-02-28 ENCOUNTER — OFFICE VISIT (OUTPATIENT)
Dept: INTERNAL MEDICINE | Age: 69
End: 2019-02-28

## 2019-02-28 VITALS
TEMPERATURE: 96.9 F | SYSTOLIC BLOOD PRESSURE: 160 MMHG | BODY MASS INDEX: 26.88 KG/M2 | HEIGHT: 70 IN | DIASTOLIC BLOOD PRESSURE: 80 MMHG | RESPIRATION RATE: 13 BRPM | WEIGHT: 187.8 LBS | HEART RATE: 58 BPM | OXYGEN SATURATION: 98 %

## 2019-02-28 DIAGNOSIS — R05.9 COUGH: ICD-10-CM

## 2019-02-28 DIAGNOSIS — E78.2 MIXED HYPERLIPIDEMIA: ICD-10-CM

## 2019-02-28 DIAGNOSIS — R73.9 HYPERGLYCEMIA: ICD-10-CM

## 2019-02-28 DIAGNOSIS — I10 BENIGN ESSENTIAL HYPERTENSION: Primary | ICD-10-CM

## 2019-02-28 LAB
ALBUMIN SERPL-MCNC: 4.7 G/DL (ref 3.5–5.2)
ALBUMIN/GLOB SERPL: 1.7 G/DL
ALP SERPL-CCNC: 64 U/L (ref 39–117)
ALT SERPL-CCNC: 21 U/L (ref 1–41)
AST SERPL-CCNC: 21 U/L (ref 1–40)
BILIRUB SERPL-MCNC: 0.4 MG/DL (ref 0.1–1.2)
BUN SERPL-MCNC: 17 MG/DL (ref 8–23)
BUN/CREAT SERPL: 17 (ref 7–25)
CALCIUM SERPL-MCNC: 10.4 MG/DL (ref 8.6–10.5)
CHLORIDE SERPL-SCNC: 100 MMOL/L (ref 98–107)
CHOLEST SERPL-MCNC: 193 MG/DL (ref 0–200)
CO2 SERPL-SCNC: 27.2 MMOL/L (ref 22–29)
CREAT SERPL-MCNC: 1 MG/DL (ref 0.76–1.27)
GLOBULIN SER CALC-MCNC: 2.7 GM/DL
GLUCOSE SERPL-MCNC: 111 MG/DL (ref 65–99)
HBA1C MFR BLD: 6.14 % (ref 4.8–5.6)
HDLC SERPL-MCNC: 46 MG/DL (ref 40–60)
LDLC SERPL CALC-MCNC: 114 MG/DL (ref 0–100)
POTASSIUM SERPL-SCNC: 4.3 MMOL/L (ref 3.5–5.2)
PROT SERPL-MCNC: 7.4 G/DL (ref 6–8.5)
SODIUM SERPL-SCNC: 141 MMOL/L (ref 136–145)
TRIGL SERPL-MCNC: 163 MG/DL (ref 0–150)
VLDLC SERPL CALC-MCNC: 32.6 MG/DL (ref 5–40)

## 2019-02-28 PROCEDURE — 99214 OFFICE O/P EST MOD 30 MIN: CPT | Performed by: INTERNAL MEDICINE

## 2019-02-28 PROCEDURE — 71046 X-RAY EXAM CHEST 2 VIEWS: CPT

## 2019-02-28 NOTE — PROGRESS NOTES
"  Jeremi Shankar . is a 68 y.o. male who presents with   Chief Complaint   Patient presents with   • Hypertension   • Hyperlipidemia   • Blood Sugar Problem   • Cough     2 months.  No fever or chills.  No shortness of breath or wheezing.  No calf tenderness.  No hemoptysis.   .    68-year-old male.  6-month checkup/lab update visit.  Ancillary complaint is a cough for 2 months.  History as above.  No phlegm.  He said he went to a \"little clinic\" in [place] recently and was prescribed Phenergan DM for the cough.  He says this has helped the cough but it did not make it go away.      Hypertension   This is a chronic problem. The current episode started more than 1 year ago. The problem has been waxing and waning since onset. The problem is controlled. Pertinent negatives include no chest pain, shortness of breath or sweats. Current antihypertension treatment includes diuretics. The current treatment provides no improvement. There are no compliance problems.    Hyperlipidemia   This is a chronic problem. The current episode started more than 1 year ago. The problem is controlled. Recent lipid tests were reviewed and are normal. Exacerbating diseases include diabetes. Pertinent negatives include no chest pain or shortness of breath. Current antihyperlipidemic treatment includes statins. The current treatment provides mild improvement of lipids. There are no compliance problems.    Blood Sugar Problem   This is a chronic problem. The current episode started more than 1 year ago. The problem has been waxing and waning. Associated symptoms include coughing. Pertinent negatives include no chest pain, chills, fever or sore throat. He has tried nothing for the symptoms.   Cough   This is a chronic problem. The current episode started more than 1 month ago. The problem occurs every few minutes. The cough is non-productive. Pertinent negatives include no chest pain, chills, fever, heartburn, hemoptysis, nasal " congestion, postnasal drip, rhinorrhea, sore throat, shortness of breath, sweats, weight loss or wheezing. Nothing aggravates the symptoms. Treatments tried: Phenergan DM. The treatment provided mild relief.        The following portions of the patient's history were reviewed and updated as appropriate: allergies, current medications, past medical history and problem list.    Review of Systems   Constitutional: Negative.  Negative for chills, fever and weight loss.   HENT: Negative.  Negative for postnasal drip, rhinorrhea and sore throat.    Eyes: Negative.    Respiratory: Positive for cough. Negative for hemoptysis, shortness of breath and wheezing.    Cardiovascular: Negative.  Negative for chest pain.   Gastrointestinal: Negative for heartburn.   Genitourinary: Negative.    Musculoskeletal: Negative.    Skin: Negative.    Neurological: Negative.    Psychiatric/Behavioral: Negative.        Objective   Physical Exam   Constitutional: He is oriented to person, place, and time. He appears well-developed and well-nourished. No distress.   HENT:   Head: Normocephalic and atraumatic.   Eyes: Conjunctivae and EOM are normal. Pupils are equal, round, and reactive to light.   Neck: Normal range of motion. Neck supple. No thyromegaly present.   Neck exam negative.  Carotid auscultation normal-no bruits heard.   Cardiovascular: Normal rate, regular rhythm, normal heart sounds and intact distal pulses. Exam reveals no gallop and no friction rub.   No murmur heard.  Pulmonary/Chest: Effort normal and breath sounds normal. No respiratory distress. He has no wheezes. He has no rales. He exhibits no tenderness.   Neurological: He is alert and oriented to person, place, and time.   Psychiatric: He has a normal mood and affect. His behavior is normal. Judgment and thought content normal.   Nursing note and vitals reviewed.      Assessment/Plan   Jeremi was seen today for hypertension, hyperlipidemia, blood sugar problem and  cough.    Diagnoses and all orders for this visit:    Benign essential hypertension  -     Comprehensive Metabolic Panel    Mixed hyperlipidemia  -     Comprehensive Metabolic Panel  -     Lipid Panel    Hyperglycemia  -     Comprehensive Metabolic Panel  -     Hemoglobin A1c    Cough  -     XR Chest PA & Lateral      Plan: Labs as above for the issues as outlined.  Blood pressure elevated at 160/80.  He will increase his hydrochlorothiazide 12.5 mg to 1 twice daily.  Recheck blood pressure in 4 weeks.    Chest x-ray today for chronic cough.    Tentative plans for a general checkup/lab update visit in 6 months advised

## 2019-03-21 DIAGNOSIS — I10 HTN (HYPERTENSION), BENIGN: ICD-10-CM

## 2019-03-21 RX ORDER — HYDROCHLOROTHIAZIDE 12.5 MG/1
TABLET ORAL
Qty: 90 TABLET | Refills: 0 | Status: SHIPPED | OUTPATIENT
Start: 2019-03-21 | End: 2019-06-12 | Stop reason: SDUPTHER

## 2019-03-29 ENCOUNTER — OFFICE VISIT (OUTPATIENT)
Dept: INTERNAL MEDICINE | Age: 69
End: 2019-03-29

## 2019-03-29 VITALS
OXYGEN SATURATION: 98 % | WEIGHT: 188.2 LBS | SYSTOLIC BLOOD PRESSURE: 140 MMHG | HEIGHT: 70 IN | DIASTOLIC BLOOD PRESSURE: 80 MMHG | RESPIRATION RATE: 13 BRPM | BODY MASS INDEX: 26.94 KG/M2 | HEART RATE: 63 BPM | TEMPERATURE: 97.9 F

## 2019-03-29 DIAGNOSIS — Z20.828 EXPOSURE TO THE FLU: ICD-10-CM

## 2019-03-29 DIAGNOSIS — I10 BENIGN ESSENTIAL HYPERTENSION: Primary | ICD-10-CM

## 2019-03-29 PROCEDURE — 99214 OFFICE O/P EST MOD 30 MIN: CPT | Performed by: INTERNAL MEDICINE

## 2019-03-29 RX ORDER — OSELTAMIVIR PHOSPHATE 75 MG/1
75 CAPSULE ORAL 2 TIMES DAILY
Qty: 10 CAPSULE | Refills: 0 | Status: SHIPPED | OUTPATIENT
Start: 2019-03-29 | End: 2019-08-29

## 2019-03-29 RX ORDER — LISINOPRIL 10 MG/1
TABLET ORAL
Qty: 30 TABLET | Refills: 5 | Status: SHIPPED | OUTPATIENT
Start: 2019-03-29 | End: 2019-07-19 | Stop reason: SDUPTHER

## 2019-03-29 NOTE — PROGRESS NOTES
"  Jeremi Shankar Sr. is a 68 y.o. male who presents with   Chief Complaint   Patient presents with   • Hypertension     On last visit the patient was advised to increase his hydrochlorothiazide to 12.5 mg twice daily.  He reports that he \"forgot\" and has only been taking it once every morning.  His blood pressures at home continue to be around 140/80.   • Exposure to type a influenza     Family member   .    68-year-old male presents for blood pressure recheck but with the history as above one might expect his blood pressure to be not significantly different than it was on last visit.  Also he reports exposure to a family member who was recently diagnosed with type A influenza.      Hypertension   This is a chronic problem. The current episode started more than 1 year ago. The problem has been waxing and waning since onset. The problem is controlled. Current antihypertension treatment includes diuretics. The current treatment provides no improvement.        The following portions of the patient's history were reviewed and updated as appropriate: allergies, current medications, past medical history and problem list.    Review of Systems   Constitutional: Negative.    HENT: Negative.    Eyes: Negative.    Respiratory: Negative.    Cardiovascular: Negative.    Genitourinary: Negative.    Musculoskeletal: Negative.    Skin: Negative.    Neurological: Negative.    Psychiatric/Behavioral: Negative.        Objective   Physical Exam   Constitutional: He is oriented to person, place, and time. He appears well-developed and well-nourished. No distress.   HENT:   Head: Normocephalic and atraumatic.   Eyes: Conjunctivae and EOM are normal. Pupils are equal, round, and reactive to light.   Neck: Normal range of motion. Neck supple. No thyromegaly present.   Neck exam negative.  Carotid auscultation normal-no bruits heard.   Cardiovascular: Normal rate, regular rhythm, normal heart sounds and intact distal pulses. Exam " reveals no gallop and no friction rub.   No murmur heard.  Pulmonary/Chest: Effort normal and breath sounds normal. No respiratory distress. He has no wheezes. He has no rales. He exhibits no tenderness.   Neurological: He is alert and oriented to person, place, and time.   Psychiatric: He has a normal mood and affect. His behavior is normal. Judgment and thought content normal.   Nursing note and vitals reviewed.      Assessment/Plan   Jeremi was seen today for hypertension and exposure to type a influenza.    Diagnoses and all orders for this visit:    Benign essential hypertension  -     lisinopril (PRINIVIL,ZESTRIL) 10 MG tablet; Take 1 every morning for blood pressure elevation    Exposure to the flu  -     oseltamivir (TAMIFLU) 75 MG capsule; Take 1 capsule by mouth 2 (Two) Times a Day.      Plan: Since he has difficulty remembering twice daily dosage of medication we will continue his hydrochlorothiazide 12.5 mg daily since he just got a refill of 90 tablets but we will add lisinopril 10 mg every morning to the treatment program.  We will recheck his blood pressure in 4 weeks.    Because of his flu exposure we will send him in some Tamiflu 75 mg twice daily times 5 days.

## 2019-04-25 RX ORDER — SIMVASTATIN 40 MG
TABLET ORAL
Qty: 90 TABLET | Refills: 1 | Status: SHIPPED | OUTPATIENT
Start: 2019-04-25 | End: 2019-10-23 | Stop reason: SDUPTHER

## 2019-05-02 ENCOUNTER — OFFICE VISIT (OUTPATIENT)
Dept: INTERNAL MEDICINE | Age: 69
End: 2019-05-02

## 2019-05-02 VITALS
TEMPERATURE: 97.5 F | HEART RATE: 62 BPM | BODY MASS INDEX: 26.48 KG/M2 | DIASTOLIC BLOOD PRESSURE: 68 MMHG | RESPIRATION RATE: 13 BRPM | HEIGHT: 70 IN | OXYGEN SATURATION: 98 % | SYSTOLIC BLOOD PRESSURE: 128 MMHG | WEIGHT: 185 LBS

## 2019-05-02 DIAGNOSIS — I10 BENIGN ESSENTIAL HYPERTENSION: Primary | ICD-10-CM

## 2019-05-02 PROCEDURE — 99213 OFFICE O/P EST LOW 20 MIN: CPT | Performed by: INTERNAL MEDICINE

## 2019-05-02 NOTE — PROGRESS NOTES
Jeremi Shankar Milagros is a 68 y.o. male who presents with   Chief Complaint   Patient presents with   • Hypertension     Blood pressure follow-up on current combination of hydrochlorothiazide 12.5 mg daily with lisinopril 10 mg daily.  He checks his blood pressure occasionally at home and is getting in the 127/72 range consistently he says.   .    68-year-old male.  Blood pressure recheck-history as outlined above.  Lisinopril 10 mg daily was added to the treatment program about a month ago.      Hypertension   This is a chronic problem. The current episode started more than 1 year ago. The problem is controlled. Past treatments include diuretics. Current antihypertension treatment includes diuretics and ACE inhibitors. The current treatment provides significant improvement. There are no compliance problems.         The following portions of the patient's history were reviewed and updated as appropriate: allergies, current medications and problem list.    Review of Systems   Constitutional: Negative.    Respiratory: Negative.    Cardiovascular: Negative.    Neurological: Negative.    Psychiatric/Behavioral: Negative.        Objective   Physical Exam   Constitutional: He is oriented to person, place, and time. He appears well-developed and well-nourished.   HENT:   Head: Normocephalic and atraumatic.   Eyes: EOM are normal. Pupils are equal, round, and reactive to light.   Cardiovascular: Normal rate and regular rhythm.   Pulmonary/Chest: Effort normal.   Neurological: He is alert and oriented to person, place, and time.   Psychiatric: He has a normal mood and affect. His behavior is normal. Judgment and thought content normal.   Nursing note and vitals reviewed.      Assessment/Plan   Jeremi was seen today for hypertension.    Diagnoses and all orders for this visit:    Benign essential hypertension      Plan: Blood pressure now well controlled since the addition of lisinopril 10 mg daily to the treatment  program.  Continue current treatment as prescribed.    6-month checkup/lab update visit due sometime toward the end of August.

## 2019-06-12 DIAGNOSIS — I10 HTN (HYPERTENSION), BENIGN: ICD-10-CM

## 2019-06-12 RX ORDER — HYDROCHLOROTHIAZIDE 12.5 MG/1
TABLET ORAL
Qty: 90 TABLET | Refills: 0 | Status: SHIPPED | OUTPATIENT
Start: 2019-06-12 | End: 2019-09-06 | Stop reason: SDUPTHER

## 2019-07-19 DIAGNOSIS — I10 BENIGN ESSENTIAL HYPERTENSION: ICD-10-CM

## 2019-07-19 RX ORDER — LISINOPRIL 10 MG/1
TABLET ORAL
Qty: 90 TABLET | Refills: 1 | Status: SHIPPED | OUTPATIENT
Start: 2019-07-19 | End: 2020-01-15

## 2019-08-29 ENCOUNTER — TELEPHONE (OUTPATIENT)
Dept: INTERNAL MEDICINE | Age: 69
End: 2019-08-29

## 2019-08-29 ENCOUNTER — OFFICE VISIT (OUTPATIENT)
Dept: INTERNAL MEDICINE | Age: 69
End: 2019-08-29

## 2019-08-29 VITALS
WEIGHT: 181.6 LBS | DIASTOLIC BLOOD PRESSURE: 70 MMHG | TEMPERATURE: 97 F | HEIGHT: 70 IN | HEART RATE: 55 BPM | SYSTOLIC BLOOD PRESSURE: 120 MMHG | BODY MASS INDEX: 26 KG/M2 | RESPIRATION RATE: 12 BRPM | OXYGEN SATURATION: 98 %

## 2019-08-29 DIAGNOSIS — E78.2 MIXED HYPERLIPIDEMIA: ICD-10-CM

## 2019-08-29 DIAGNOSIS — Z00.00 HEALTHCARE MAINTENANCE: ICD-10-CM

## 2019-08-29 DIAGNOSIS — G56.03 BILATERAL CARPAL TUNNEL SYNDROME: ICD-10-CM

## 2019-08-29 DIAGNOSIS — E11.9 TYPE 2 DIABETES MELLITUS WITHOUT COMPLICATION, WITHOUT LONG-TERM CURRENT USE OF INSULIN (HCC): ICD-10-CM

## 2019-08-29 DIAGNOSIS — I10 BENIGN ESSENTIAL HYPERTENSION: Primary | ICD-10-CM

## 2019-08-29 PROCEDURE — 99214 OFFICE O/P EST MOD 30 MIN: CPT | Performed by: INTERNAL MEDICINE

## 2019-08-29 NOTE — PROGRESS NOTES
"Jeremi Shankar . is a 68 y.o. male who presents with   Chief Complaint   Patient presents with   • Hypertension     Hydrochlorothiazide 12.5 mg; lisinopril 10 mg   • Hyperlipidemia     Simvastatin 40 mg   • Diabetes     No prescription medication; weight decreased from 187 pounds in February to his current weight of 181 pounds.  Goal weight at his height of 5 feet 10 is 185 pounds   .    68-year-old male.  Six-month checkup/lab update visit.  Known history of bilateral carpal tunnel syndrome and is having symptoms in both hands of discomfort \"like arthritis\"      Hypertension   This is a chronic problem. The current episode started more than 1 year ago. The problem is unchanged. The problem is controlled. Current antihypertension treatment includes diuretics and ACE inhibitors. The current treatment provides moderate improvement. There are no compliance problems.    Diabetes   He presents for his follow-up diabetic visit. He has type 2 diabetes mellitus. His disease course has been stable. Symptoms are stable. Current diabetic treatment includes diet.   Hyperlipidemia   This is a chronic problem. The current episode started more than 1 year ago. The problem is controlled. Recent lipid tests were reviewed and are normal. Current antihyperlipidemic treatment includes statins. The current treatment provides moderate improvement of lipids. There are no compliance problems.         The following portions of the patient's history were reviewed and updated as appropriate: allergies, current medications, past medical history and problem list.    Review of Systems   Constitutional: Negative.    HENT: Negative.    Eyes: Negative.    Respiratory: Negative.    Cardiovascular: Negative.    Genitourinary: Negative.    Musculoskeletal: Negative.    Skin: Negative.    Neurological: Negative.    Psychiatric/Behavioral: Negative.        Objective   Physical Exam   Constitutional: He is oriented to person, place, and time. He " appears well-developed and well-nourished. No distress.   HENT:   Head: Normocephalic and atraumatic.   Eyes: Conjunctivae and EOM are normal. Pupils are equal, round, and reactive to light.   Neck: Normal range of motion. Neck supple. No thyromegaly present.   Neck exam negative.  Carotid auscultation normal-no bruits heard.   Cardiovascular: Normal rate, regular rhythm, normal heart sounds and intact distal pulses. Exam reveals no gallop and no friction rub.   No murmur heard.  Pulmonary/Chest: Effort normal and breath sounds normal. No respiratory distress. He has no wheezes. He has no rales. He exhibits no tenderness.   Musculoskeletal:   Bilateral symptomatic carpal tunnel syndrome   Neurological: He is alert and oriented to person, place, and time.   Psychiatric: He has a normal mood and affect. His behavior is normal. Judgment and thought content normal.   Nursing note and vitals reviewed.      Assessment/Plan   Jeremi was seen today for hypertension, hyperlipidemia and diabetes.    Diagnoses and all orders for this visit:    Benign essential hypertension  -     Comprehensive Metabolic Panel    Mixed hyperlipidemia  -     Comprehensive Metabolic Panel  -     Lipid Panel    Type 2 diabetes mellitus without complication, without long-term current use of insulin (CMS/[facility])  -     Comprehensive Metabolic Panel  -     Hemoglobin A1c  -     MicroAlbumin, Urine, Random - Urine, Clean Catch    Healthcare maintenance  -     Hepatitis C Antibody    Bilateral carpal tunnel syndrome      Plan: Labs as above.  Continue current treatment.  6 months-wellness visit/lab update visit advised.    For his symptoms of carpal tunnel syndrome he will try vitamin B2 and B6.  He will take 2 tablets of B2 in the morning and 1 tablet of B6 in the morning and 2 tablets of B2 in the evening and 1 tablet of B6 in the evening.

## 2019-08-30 LAB
ALBUMIN SERPL-MCNC: 4.8 G/DL (ref 3.5–5.2)
ALBUMIN/GLOB SERPL: 2 G/DL
ALP SERPL-CCNC: 70 U/L (ref 39–117)
ALT SERPL-CCNC: 18 U/L (ref 1–41)
AST SERPL-CCNC: 22 U/L (ref 1–40)
BILIRUB SERPL-MCNC: 0.6 MG/DL (ref 0.2–1.2)
BUN SERPL-MCNC: 19 MG/DL (ref 8–23)
BUN/CREAT SERPL: 18.3 (ref 7–25)
CALCIUM SERPL-MCNC: 10.3 MG/DL (ref 8.6–10.5)
CHLORIDE SERPL-SCNC: 98 MMOL/L (ref 98–107)
CHOLEST SERPL-MCNC: 203 MG/DL (ref 0–200)
CO2 SERPL-SCNC: 29 MMOL/L (ref 22–29)
CREAT SERPL-MCNC: 1.04 MG/DL (ref 0.76–1.27)
GLOBULIN SER CALC-MCNC: 2.4 GM/DL
GLUCOSE SERPL-MCNC: 104 MG/DL (ref 65–99)
HBA1C MFR BLD: 5.9 % (ref 4.8–5.6)
HCV AB S/CO SERPL IA: <0.1 S/CO RATIO (ref 0–0.9)
HDLC SERPL-MCNC: 48 MG/DL (ref 40–60)
LDLC SERPL CALC-MCNC: 124 MG/DL (ref 0–100)
MICROALBUMIN UR-MCNC: 3.1 UG/ML
POTASSIUM SERPL-SCNC: 4.3 MMOL/L (ref 3.5–5.2)
PROT SERPL-MCNC: 7.2 G/DL (ref 6–8.5)
SODIUM SERPL-SCNC: 140 MMOL/L (ref 136–145)
TRIGL SERPL-MCNC: 156 MG/DL (ref 0–150)
VLDLC SERPL CALC-MCNC: 31.2 MG/DL

## 2019-08-30 NOTE — PROGRESS NOTES
With minor variations all labs remain basically unchanged and within ranges of acceptability.  A follow-up lab checkup/wellness visit is advised for 6 months as discussed

## 2019-09-06 DIAGNOSIS — I10 HTN (HYPERTENSION), BENIGN: ICD-10-CM

## 2019-09-09 RX ORDER — HYDROCHLOROTHIAZIDE 12.5 MG/1
TABLET ORAL
Qty: 90 TABLET | Refills: 0 | Status: SHIPPED | OUTPATIENT
Start: 2019-09-09 | End: 2019-12-24

## 2019-10-23 RX ORDER — SIMVASTATIN 40 MG
TABLET ORAL
Qty: 90 TABLET | Refills: 1 | Status: SHIPPED | OUTPATIENT
Start: 2019-10-23 | End: 2020-04-16

## 2019-12-24 DIAGNOSIS — I10 HTN (HYPERTENSION), BENIGN: ICD-10-CM

## 2019-12-24 RX ORDER — HYDROCHLOROTHIAZIDE 12.5 MG/1
TABLET ORAL
Qty: 90 TABLET | Refills: 2 | Status: SHIPPED | OUTPATIENT
Start: 2019-12-24 | End: 2020-04-17 | Stop reason: SDUPTHER

## 2020-01-15 DIAGNOSIS — I10 BENIGN ESSENTIAL HYPERTENSION: ICD-10-CM

## 2020-01-15 RX ORDER — LISINOPRIL 10 MG/1
TABLET ORAL
Qty: 90 TABLET | Refills: 1 | Status: SHIPPED | OUTPATIENT
Start: 2020-01-15 | End: 2020-04-17 | Stop reason: SINTOL

## 2020-03-17 ENCOUNTER — TELEPHONE (OUTPATIENT)
Dept: INTERNAL MEDICINE | Age: 70
End: 2020-03-17

## 2020-03-17 NOTE — TELEPHONE ENCOUNTER
Pt called back and stated he does not want to come in today and wants to wait until the 16th to come in.

## 2020-03-17 NOTE — TELEPHONE ENCOUNTER
Patient was working on a car recently and thinks he hurt his knee. Right knee is sore to the touch patient denies bruising but was working for three days and now is having trouble moving it around a lot. Patient states its mostly on the inside of his right knee and initially it was swollen but is not anymore.    Patient can be reached at [phone]     [id number] [place], [place] - [address] [phone] [facility] [phone] FX

## 2020-03-17 NOTE — TELEPHONE ENCOUNTER
Left message for the pt to contact our office to re-schedule medicare annual wellness visit. Ok for hub to schedule.

## 2020-04-01 ENCOUNTER — TELEPHONE (OUTPATIENT)
Dept: INTERNAL MEDICINE | Age: 70
End: 2020-04-01

## 2020-04-01 NOTE — TELEPHONE ENCOUNTER
Would suspect that the cough is most likely from lisinopril which is the most common side effect of the drug.  Discontinue lisinopril at this point but since this is being discontinued I would suggest that you likely will need to increase the hydrochlorothiazide 12.5 mg to 1 every 12 hours.  Once lisinopril was discontinued the cough gradually goes away.  It may take as long as 2-4 weeks to totally severe.  Blood pressure follow-up checkup advised for 4 weeks.

## 2020-04-01 NOTE — TELEPHONE ENCOUNTER
PT CALLED REQUESTING A RX FOR A BAD COUGH FOR ABOUT 3 MONTHS. PT SAID IT IS NOT KEEPING HIM UP AT NIGHT BUT HE WANTS TO GET RID OF IT SINCE ITS BEEN ON AND OFF SINCE October.    PT CONFIRMED [facility]    PT CALL BACK NUMBER [phone]

## 2020-04-16 RX ORDER — SIMVASTATIN 40 MG
TABLET ORAL
Qty: 90 TABLET | Refills: 1 | Status: SHIPPED | OUTPATIENT
Start: 2020-04-16 | End: 2020-10-08

## 2020-04-17 ENCOUNTER — TELEPHONE (OUTPATIENT)
Dept: INTERNAL MEDICINE | Age: 70
End: 2020-04-17

## 2020-04-17 DIAGNOSIS — I10 HTN (HYPERTENSION), BENIGN: Primary | ICD-10-CM

## 2020-04-17 RX ORDER — HYDROCHLOROTHIAZIDE 12.5 MG/1
TABLET ORAL
Qty: 180 TABLET | Refills: 2 | Status: SHIPPED | OUTPATIENT
Start: 2020-04-17 | End: 2021-01-19 | Stop reason: SDUPTHER

## 2020-04-17 NOTE — TELEPHONE ENCOUNTER
This is correct.  I have forwarded a new prescription for hydrochlorothiazide 12.5 mg-#180-2 refills-take 1 every 12 hours for elevated blood pressure.  Remain off of lisinopril as previously advised

## 2020-04-17 NOTE — TELEPHONE ENCOUNTER
----- Message from Jeremi Shankar Sr. sent at 4/17/2020 12:05 PM EDT -----  Regarding: Prescription Question  Contact: [phone]  Dr Jacobs has taken me off lisinipril and I now take 1 hydrachlorothyazide in the morning and 1 at night so I need your office to contact [facility] and update this info so I don't run out. Thank You

## 2020-06-16 ENCOUNTER — OFFICE VISIT (OUTPATIENT)
Dept: INTERNAL MEDICINE | Age: 70
End: 2020-06-16

## 2020-06-16 VITALS
OXYGEN SATURATION: 96 % | RESPIRATION RATE: 13 BRPM | WEIGHT: 181.2 LBS | HEART RATE: 86 BPM | HEIGHT: 70 IN | BODY MASS INDEX: 25.94 KG/M2 | SYSTOLIC BLOOD PRESSURE: 120 MMHG | TEMPERATURE: 97.6 F | DIASTOLIC BLOOD PRESSURE: 70 MMHG

## 2020-06-16 DIAGNOSIS — I10 BENIGN ESSENTIAL HYPERTENSION: ICD-10-CM

## 2020-06-16 DIAGNOSIS — G56.03 BILATERAL CARPAL TUNNEL SYNDROME: ICD-10-CM

## 2020-06-16 DIAGNOSIS — N48.6 PEYRONIE'S DISEASE: ICD-10-CM

## 2020-06-16 DIAGNOSIS — R35.1 NOCTURIA: ICD-10-CM

## 2020-06-16 DIAGNOSIS — E11.9 TYPE 2 DIABETES MELLITUS WITHOUT COMPLICATION, WITHOUT LONG-TERM CURRENT USE OF INSULIN (HCC): ICD-10-CM

## 2020-06-16 DIAGNOSIS — Z00.00 MEDICARE ANNUAL WELLNESS VISIT, INITIAL: Primary | ICD-10-CM

## 2020-06-16 DIAGNOSIS — E78.2 MIXED HYPERLIPIDEMIA: ICD-10-CM

## 2020-06-16 PROCEDURE — 99214 OFFICE O/P EST MOD 30 MIN: CPT | Performed by: INTERNAL MEDICINE

## 2020-06-16 PROCEDURE — G0439 PPPS, SUBSEQ VISIT: HCPCS | Performed by: INTERNAL MEDICINE

## 2020-06-16 NOTE — PROGRESS NOTES
Jeremi Shankar  is a 69 y.o. male who presents with   Chief Complaint   Patient presents with   • Medicare Wellness-Initial Visit   • Hypertension     Hydrochlorothiazide 12.5 mg   • Hyperlipidemia     Simvastatin 40 mg   • Diabetes     No prescription treatment.  Dietary management   • Nocturia/BPH     Nocturia x1   • Carpal tunnel syndrome/hand/finger numbness     Prior diagnosis of carpal tunnel syndrome.  Right hand/fingers tingling   • Suspected Peyronie's disease     As noted   .    This patient is a 69-year-old male who presents for his initial Medicare wellness visit.  In addition to the wellness visit the physical issues as outlined above were dealt with separately and individually.  Also recent labs were reviewed with the patient and all medications were reviewed as well.  Total amount of time spent with this patient dealing with these physical issues over and above the amount of time spent performing the Medicare wellness visit amounted to 30 minutes.  Better than 50% of the office visit time was spent in direct face-to-face consultation with the patient regarding these issues.      Hypertension   This is a chronic problem. The current episode started more than 1 year ago. The problem is unchanged. The problem is controlled. Current antihypertension treatment includes diuretics. The current treatment provides moderate improvement. There are no compliance problems.    Hyperlipidemia   This is a chronic problem. The current episode started more than 1 year ago. The problem is controlled. Recent lipid tests were reviewed and are normal. Current antihyperlipidemic treatment includes statins. The current treatment provides moderate improvement of lipids. There are no compliance problems.    Diabetes   He presents for his follow-up diabetic visit. He has type 2 diabetes mellitus. His disease course has been stable. There are no hypoglycemic complications. Symptoms are stable. There are no diabetic  "complications. There are no known risk factors for coronary artery disease. Current diabetic treatment includes diet.      Rest of the history is as outlined above.    /70   Pulse 86   Temp 97.6 °F (36.4 °C)   Resp 13   Ht 177.8 cm (70\")   Wt 82.2 kg (181 lb 3.2 oz)   SpO2 96%   BMI 26.00 kg/m²     The following portions of the patient's history were reviewed and updated as appropriate: allergies, current medications, past family history, past medical history, past social history, past surgical history and problem list.    Review of Systems   Constitutional: Negative.    HENT: Negative.    Eyes: Negative.    Respiratory: Negative.    Cardiovascular: Negative.    Genitourinary: Positive for penile pain.        Nocturia; suspected Ioana's   Musculoskeletal: Negative.    Skin: Negative.    Neurological: Negative.    Psychiatric/Behavioral: Negative.        Objective   Physical Exam   Constitutional: He is oriented to person, place, and time. He appears well-developed and well-nourished. No distress.   HENT:   Head: Normocephalic and atraumatic.   Right Ear: External ear normal.   Left Ear: External ear normal.   Nose: Nose normal.   Mouth/Throat: Oropharynx is clear and moist. No oropharyngeal exudate.   Eyes: Pupils are equal, round, and reactive to light. Conjunctivae and EOM are normal. Right eye exhibits no discharge. Left eye exhibits no discharge. No scleral icterus.   Neck: Normal range of motion. Neck supple. No JVD present. No tracheal deviation present. No thyromegaly present.   Neck exam negative.  Carotid auscultation normal-no bruits heard.   Cardiovascular: Normal rate, regular rhythm, normal heart sounds and intact distal pulses. Exam reveals no gallop and no friction rub.   No murmur heard.  Pulmonary/Chest: Effort normal and breath sounds normal. No respiratory distress. He has no wheezes. He has no rales. He exhibits no tenderness.   Abdominal: Soft. Bowel sounds are normal. He exhibits " no distension and no mass. There is no tenderness. No hernia.   Genitourinary:   Genitourinary Comments: Digital rectal exam performed.  Prostate twice normal size.  Symmetrical.  No nodularities.  Rightward bending of the penis suggesting Peyronie's by history   Musculoskeletal: Normal range of motion. He exhibits no edema, tenderness or deformity.   Lymphadenopathy:     He has no cervical adenopathy.   Neurological: He is alert and oriented to person, place, and time. He has normal reflexes. He displays normal reflexes. No cranial nerve deficit. He exhibits normal muscle tone. Coordination normal.   Skin: Skin is warm and dry. No rash noted. He is not diaphoretic. No erythema. No pallor.   Psychiatric: He has a normal mood and affect. His behavior is normal. Judgment and thought content normal.   Nursing note and vitals reviewed.      Assessment/Plan   Jeremi was seen today for medicare wellness-initial visit, hypertension, hyperlipidemia, diabetes, nocturia/bph, carpal tunnel syndrome/hand/finger numbness and suspected peyronie's disease.    Diagnoses and all orders for this visit:    Medicare annual wellness visit, initial    Benign essential hypertension  -     CBC & Differential  -     Comprehensive Metabolic Panel  -     Lipid Panel  -     Urinalysis With Microscopic If Indicated (No Culture) - Urine, Clean Catch  -     TSH+Free T4    Type 2 diabetes mellitus without complication, without long-term current use of insulin (CMS/[facility])  -     Comprehensive Metabolic Panel  -     Hemoglobin A1c  -     Lipid Panel  -     Urinalysis With Microscopic If Indicated (No Culture) - Urine, Clean Catch  -     TSH+Free T4    Nocturia  -     Urinalysis With Microscopic If Indicated (No Culture) - Urine, Clean Catch  -     PSA DIAGNOSTIC    Bilateral carpal tunnel syndrome  -     Ambulatory Referral to Hand Surgery    Peyronie's disease  -     Ambulatory Referral to Urology    Mixed hyperlipidemia        Plan: Labs as above  for the issues as outlined.  Continue treatment as prescribed.    6-month checkup advised with any necessary labs.    Subsequent Medicare wellness visit advised for 1 year.    Hand surgery referral for carpal tunnel syndrome symptoms.    Urology referral for Ioana's disease.    Colonoscopy advised but he prefers to wait until he gets his urology and hand surgery issues cleared up.    Included in today's exam was face to face time spent in preventative counseling regarding weight loss, daily exercise, and minimizing sweets and carbohydrates.  Additionally, health maintenance needs were discussed and reviewed as well.

## 2020-06-16 NOTE — PROGRESS NOTES
The ABCs of the Annual Wellness Visit  Initial Medicare Wellness Visit    Chief Complaint   Patient presents with   • Medicare Wellness-Initial Visit   • Hypertension     Hydrochlorothiazide 12.5 mg   • Hyperlipidemia     Simvastatin 40 mg   • Diabetes     No prescription treatment.  Dietary management   • Nocturia/BPH     Nocturia x1   • Carpal tunnel syndrome/hand/finger numbness     Prior diagnosis of carpal tunnel syndrome.  Right hand/fingers tingling   • Suspected Peyronie's disease     As noted       Subjective   History of Present Illness:  Jeremi Shankar Sr. is a 69 y.o. male who presents for an Initial Medicare Wellness Visit.    HEALTH RISK ASSESSMENT    Recent Hospitalizations:  No hospitalization(s) within the last year.    Current Medical Providers:  Patient Care Team:  Ricci Jacobs MD as PCP - General  Ricci Jacobs MD as PCP - Family Medicine  Ricci Jacobs MD as PCP - Claims Attributed    Smoking Status:  Social History     Tobacco Use   Smoking Status Never Smoker   Smokeless Tobacco Never Used       Alcohol Consumption:  Social History     Substance and Sexual Activity   Alcohol Use No       Depression Screen:   PHQ-2/PHQ-9 Depression Screening 6/16/2020   Little interest or pleasure in doing things 0   Feeling down, depressed, or hopeless 0   Trouble falling or staying asleep, or sleeping too much -   Feeling tired or having little energy -   Poor appetite or overeating -   Feeling bad about yourself - or that you are a failure or have let yourself or your family down -   Trouble concentrating on things, such as reading the newspaper or watching television -   Moving or speaking so slowly that other people could have noticed. Or the opposite - being so fidgety or restless that you have been moving around a lot more than usual -   Thoughts that you would be better off dead, or of hurting yourself in some way -   Total Score 0   If you checked off any problems, how difficult have these  problems made it for you to do your work, take care of things at home, or get along with other people? -       Fall Risk Screen:  NIKOLE Fall Risk Assessment was completed, and patient is at LOW risk for falls.Assessment completed on:6/16/2020    Health Habits and Functional and Cognitive Screening:  Functional & Cognitive Status 6/16/2020   Do you have difficulty preparing food and eating? No   Do you have difficulty bathing yourself, getting dressed or grooming yourself? No   Do you have difficulty using the toilet? No   Do you have difficulty moving around from place to place? No   Do you have trouble with steps or getting out of a bed or a chair? No   Do you need help using the phone?  No   Are you deaf or do you have serious difficulty hearing?  No   Do you need help with transportation? No   Do you need help shopping? No   Do you need help preparing meals?  No   Do you need help with housework?  No   Do you need help with laundry? No   Do you need help taking your medications? No   Do you need help managing money? No   Do you ever drive or ride in a car without wearing a seat belt? No         Does the patient have evidence of cognitive impairment? No    Asprin use counseling:Taking ASA appropriately as indicated    Age-appropriate Screening Schedule:  Refer to the list below for future screening recommendations based on patient's age, sex and/or medical conditions. Orders for these recommended tests are listed in the plan section. The patient has been provided with a written plan.    Health Maintenance   Topic Date Due   • HEMOGLOBIN A1C  02/29/2020   • TDAP/TD VACCINES (1 - Tdap) 06/16/2020 (Originally 10/11/1961)   • ZOSTER VACCINE (1 of 2) 06/16/2020 (Originally 10/11/1999)   • INFLUENZA VACCINE  08/01/2020   • DIABETIC FOOT EXAM  08/29/2020   • LIPID PANEL  08/29/2020   • DIABETIC EYE EXAM  08/29/2020   • URINE MICROALBUMIN  08/29/2020   • COLONOSCOPY  03/30/2025          The following portions of the  "patient's history were reviewed and updated as appropriate: allergies, current medications, past family history, past medical history, past social history, past surgical history and problem list.    Outpatient Medications Prior to Visit   Medication Sig Dispense Refill   • aspirin 81 MG EC tablet Take  by mouth.     • hydroCHLOROthiazide (HYDRODIURIL) 12.5 MG tablet Take 1 every 12 hours for elevated blood pressure 180 tablet 2   • simvastatin (ZOCOR) 40 MG tablet TAKE 1 TABLET BY MOUTH EVERY NIGHT. 90 tablet 1     No facility-administered medications prior to visit.        Patient Active Problem List   Diagnosis   • Benign colonic polyp   • Benign essential hypertension   • Diabetes mellitus (CMS/HCC)   • Diverticulosis of large intestine   • Heart murmur   • Hyperlipidemia   • Inguinal hernia   • Knee pain   • Nocturia   • Hyperglycemia   • Bilateral carpal tunnel syndrome   • Peyronie's disease       Advanced Care Planning:  ACP discussion was held with the patient during this visit. Patient has an advance directive (not in EMR), copy requested.    Review of Systems    Compared to one year ago, the patient feels his physical health is the same.  Compared to one year ago, the patient feels his mental health is the same.    Reviewed chart for potential of high risk medication in the elderly: yes  Reviewed chart for potential of harmful drug interactions in the elderly:yes    Objective         Vitals:    06/16/20 1359 06/16/20 1459   BP:  120/70   Pulse: 86 86   Resp:  13   Temp: 97.6 °F (36.4 °C)    SpO2: 96%    Weight: 82.2 kg (181 lb 3.2 oz)    Height: 177.8 cm (70\")        Body mass index is 26 kg/m².  Discussed the patient's BMI with him. The BMI is in the acceptable range.    Physical Exam  See dictated note from this exam        Assessment/Plan   Medicare Risks and Personalized Health Plan  CMS Preventative Services Quick Reference  Cardiovascular risk  Colon Cancer Screening  Prostate Cancer Screening "     The above risks/problems have been discussed with the patient.  Pertinent information has been shared with the patient in the After Visit Summary.  Follow up plans and orders are seen below in the Assessment/Plan Section.    Diagnoses and all orders for this visit:    1. Medicare annual wellness visit, initial (Primary)    2. Benign essential hypertension  -     CBC & Differential  -     Comprehensive Metabolic Panel  -     Lipid Panel  -     Urinalysis With Microscopic If Indicated (No Culture) - Urine, Clean Catch  -     TSH+Free T4    3. Type 2 diabetes mellitus without complication, without long-term current use of insulin (CMS/[facility])  -     Comprehensive Metabolic Panel  -     Hemoglobin A1c  -     Lipid Panel  -     Urinalysis With Microscopic If Indicated (No Culture) - Urine, Clean Catch  -     TSH+Free T4    4. Nocturia  -     Urinalysis With Microscopic If Indicated (No Culture) - Urine, Clean Catch  -     PSA DIAGNOSTIC    5. Bilateral carpal tunnel syndrome  -     Ambulatory Referral to Hand Surgery    6. Peyronie's disease  -     Ambulatory Referral to Urology    7. Mixed hyperlipidemia      Follow Up:  Return in about 6 months (around 12/16/2020) for Recheck, Blood pressure check; wellness visit with lab updates-1 year.     Continue treatment as prescribed.    Included in today's exam was face to face time spent in preventative counseling regarding weight loss, daily exercise, and minimizing sweets and carbohydrates.  Additionally, health maintenance needs were discussed and reviewed as well.      An After Visit Summary and PPPS were given to the patient.

## 2020-06-17 LAB
ALBUMIN SERPL-MCNC: 5.3 G/DL (ref 3.5–5.2)
ALBUMIN/GLOB SERPL: 2.1 G/DL
ALP SERPL-CCNC: 75 U/L (ref 39–117)
ALT SERPL-CCNC: 22 U/L (ref 1–41)
APPEARANCE UR: CLEAR
AST SERPL-CCNC: 22 U/L (ref 1–40)
BACTERIA #/AREA URNS HPF: ABNORMAL /HPF
BASOPHILS # BLD AUTO: 0.01 10*3/MM3 (ref 0–0.2)
BASOPHILS NFR BLD AUTO: 0.2 % (ref 0–1.5)
BILIRUB SERPL-MCNC: 0.5 MG/DL (ref 0.2–1.2)
BILIRUB UR QL STRIP: NEGATIVE
BUN SERPL-MCNC: 19 MG/DL (ref 8–23)
BUN/CREAT SERPL: 16.7 (ref 7–25)
CALCIUM SERPL-MCNC: 10.2 MG/DL (ref 8.6–10.5)
CHLORIDE SERPL-SCNC: 99 MMOL/L (ref 98–107)
CHOLEST SERPL-MCNC: 222 MG/DL (ref 0–200)
CO2 SERPL-SCNC: 28.9 MMOL/L (ref 22–29)
COLOR UR: YELLOW
CREAT SERPL-MCNC: 1.14 MG/DL (ref 0.76–1.27)
EOSINOPHIL # BLD AUTO: 0.06 10*3/MM3 (ref 0–0.4)
EOSINOPHIL NFR BLD AUTO: 1 % (ref 0.3–6.2)
EPI CELLS #/AREA URNS HPF: ABNORMAL /HPF
ERYTHROCYTE [DISTWIDTH] IN BLOOD BY AUTOMATED COUNT: 14.6 % (ref 12.3–15.4)
GLOBULIN SER CALC-MCNC: 2.5 GM/DL
GLUCOSE SERPL-MCNC: 106 MG/DL (ref 65–99)
GLUCOSE UR QL: NEGATIVE
HBA1C MFR BLD: 6.1 % (ref 4.8–5.6)
HCT VFR BLD AUTO: 48 % (ref 37.5–51)
HDLC SERPL-MCNC: 56 MG/DL (ref 40–60)
HGB BLD-MCNC: 16 G/DL (ref 13–17.7)
HGB UR QL STRIP: NEGATIVE
IMM GRANULOCYTES # BLD AUTO: 0.02 10*3/MM3 (ref 0–0.05)
IMM GRANULOCYTES NFR BLD AUTO: 0.3 % (ref 0–0.5)
KETONES UR QL STRIP: NEGATIVE
LDLC SERPL CALC-MCNC: 129 MG/DL (ref 0–100)
LEUKOCYTE ESTERASE UR QL STRIP: NEGATIVE
LYMPHOCYTES # BLD AUTO: 1.8 10*3/MM3 (ref 0.7–3.1)
LYMPHOCYTES NFR BLD AUTO: 31.4 % (ref 19.6–45.3)
MCH RBC QN AUTO: 27.9 PG (ref 26.6–33)
MCHC RBC AUTO-ENTMCNC: 33.3 G/DL (ref 31.5–35.7)
MCV RBC AUTO: 83.6 FL (ref 79–97)
MONOCYTES # BLD AUTO: 0.5 10*3/MM3 (ref 0.1–0.9)
MONOCYTES NFR BLD AUTO: 8.7 % (ref 5–12)
NEUTROPHILS # BLD AUTO: 3.34 10*3/MM3 (ref 1.7–7)
NEUTROPHILS NFR BLD AUTO: 58.4 % (ref 42.7–76)
NITRITE UR QL STRIP: NEGATIVE
NRBC BLD AUTO-RTO: 0 /100 WBC (ref 0–0.2)
PH UR STRIP: 6 [PH] (ref 5–8)
PLATELET # BLD AUTO: 222 10*3/MM3 (ref 140–450)
POTASSIUM SERPL-SCNC: 4.2 MMOL/L (ref 3.5–5.2)
PROT SERPL-MCNC: 7.8 G/DL (ref 6–8.5)
PROT UR QL STRIP: ABNORMAL
PSA SERPL-MCNC: 1.2 NG/ML (ref 0–4)
RBC # BLD AUTO: 5.74 10*6/MM3 (ref 4.14–5.8)
RBC #/AREA URNS HPF: ABNORMAL /HPF
SODIUM SERPL-SCNC: 139 MMOL/L (ref 136–145)
SP GR UR: 1.02 (ref 1–1.03)
T4 FREE SERPL-MCNC: 1.1 NG/DL (ref 0.93–1.7)
TRIGL SERPL-MCNC: 184 MG/DL (ref 0–150)
TSH SERPL DL<=0.005 MIU/L-ACNC: 1.6 UIU/ML (ref 0.27–4.2)
UROBILINOGEN UR STRIP-MCNC: ABNORMAL MG/DL
VLDLC SERPL CALC-MCNC: 36.8 MG/DL
WBC # BLD AUTO: 5.73 10*3/MM3 (ref 3.4–10.8)
WBC #/AREA URNS HPF: ABNORMAL /HPF

## 2020-08-04 ENCOUNTER — HOSPITAL ENCOUNTER (OUTPATIENT)
Dept: CARDIOLOGY | Facility: HOSPITAL | Age: 70
Discharge: HOME | End: 2020-08-04
Admitting: SURGERY

## 2020-08-04 ENCOUNTER — TRANSCRIBE ORDERS (OUTPATIENT)
Dept: CARDIOLOGY | Facility: HOSPITAL | Age: 70
End: 2020-08-04

## 2020-08-04 ENCOUNTER — LAB (OUTPATIENT)
Dept: LAB | Facility: HOSPITAL | Age: 70
End: 2020-08-04

## 2020-08-04 DIAGNOSIS — I10 ESSENTIAL HYPERTENSION, MALIGNANT: ICD-10-CM

## 2020-08-04 DIAGNOSIS — I10 ESSENTIAL HYPERTENSION, MALIGNANT: Primary | ICD-10-CM

## 2020-08-04 LAB — POTASSIUM SERPL-SCNC: 4.4 MMOL/L (ref 3.5–5.2)

## 2020-08-04 PROCEDURE — 93010 ELECTROCARDIOGRAM REPORT: CPT | Performed by: INTERNAL MEDICINE

## 2020-08-04 PROCEDURE — 84132 ASSAY OF SERUM POTASSIUM: CPT

## 2020-08-04 PROCEDURE — 36415 COLL VENOUS BLD VENIPUNCTURE: CPT

## 2020-08-04 PROCEDURE — 93005 ELECTROCARDIOGRAM TRACING: CPT | Performed by: SURGERY

## 2020-10-08 DIAGNOSIS — E78.2 MIXED HYPERLIPIDEMIA: Primary | ICD-10-CM

## 2020-10-08 RX ORDER — SIMVASTATIN 40 MG
TABLET ORAL
Qty: 90 TABLET | Refills: 1 | Status: SHIPPED | OUTPATIENT
Start: 2020-10-08 | End: 2021-01-19 | Stop reason: SDUPTHER

## 2020-12-18 ENCOUNTER — OFFICE VISIT (OUTPATIENT)
Dept: INTERNAL MEDICINE | Age: 70
End: 2020-12-18

## 2020-12-18 VITALS
BODY MASS INDEX: 26.77 KG/M2 | OXYGEN SATURATION: 96 % | SYSTOLIC BLOOD PRESSURE: 154 MMHG | HEIGHT: 70 IN | RESPIRATION RATE: 14 BRPM | TEMPERATURE: 96.9 F | DIASTOLIC BLOOD PRESSURE: 80 MMHG | HEART RATE: 86 BPM | WEIGHT: 187 LBS

## 2020-12-18 DIAGNOSIS — E11.9 TYPE 2 DIABETES MELLITUS WITHOUT COMPLICATION, WITHOUT LONG-TERM CURRENT USE OF INSULIN (HCC): ICD-10-CM

## 2020-12-18 DIAGNOSIS — E78.2 MIXED HYPERLIPIDEMIA: ICD-10-CM

## 2020-12-18 DIAGNOSIS — I10 BENIGN ESSENTIAL HYPERTENSION: Primary | ICD-10-CM

## 2020-12-18 LAB
BUN SERPL-MCNC: 15 MG/DL (ref 8–23)
BUN/CREAT SERPL: 14 (ref 7–25)
CALCIUM SERPL-MCNC: 9.9 MG/DL (ref 8.6–10.5)
CHLORIDE SERPL-SCNC: 93 MMOL/L (ref 98–107)
CHOLEST SERPL-MCNC: 206 MG/DL (ref 0–200)
CO2 SERPL-SCNC: 31.6 MMOL/L (ref 22–29)
CREAT SERPL-MCNC: 1.07 MG/DL (ref 0.76–1.27)
GLUCOSE SERPL-MCNC: 107 MG/DL (ref 65–99)
HBA1C MFR BLD: 6.3 % (ref 4.8–5.6)
HDLC SERPL-MCNC: 50 MG/DL (ref 40–60)
LDLC SERPL CALC-MCNC: 117 MG/DL (ref 0–100)
POTASSIUM SERPL-SCNC: 3.9 MMOL/L (ref 3.5–5.2)
SODIUM SERPL-SCNC: 133 MMOL/L (ref 136–145)
TRIGL SERPL-MCNC: 226 MG/DL (ref 0–150)
VLDLC SERPL CALC-MCNC: 39 MG/DL (ref 5–40)

## 2020-12-18 PROCEDURE — 99214 OFFICE O/P EST MOD 30 MIN: CPT | Performed by: INTERNAL MEDICINE

## 2020-12-18 NOTE — PROGRESS NOTES
"  Jeremi Shankar . is a 70 y.o. male who presents with   Chief Complaint   Patient presents with   • Hypertension   • Diabetes   • Hyperlipidemia   .    70-year-old male.  Last wellness visit in .  6-month checkup today with any necessary lab updates.  We will recheck his blood sugar, A1c and lipids.  Also he reports that his brother recently  and the  is tomorrow he says.    Hypertension  This is a chronic problem. The current episode started more than 1 year ago. The problem has been waxing and waning since onset. Current antihypertension treatment includes diuretics. The current treatment provides moderate improvement. There are no compliance problems.    Diabetes  He presents for his follow-up diabetic visit. He has type 2 diabetes mellitus. His disease course has been stable. There are no hypoglycemic associated symptoms. Symptoms are stable. Current diabetic treatments: See medication list.   Hyperlipidemia  This is a chronic problem. The current episode started more than 1 year ago. The problem is controlled. Recent lipid tests were reviewed and are normal. Treatments tried: See medication list.        /80   Pulse 86   Temp 96.9 °F (36.1 °C)   Resp 14   Ht 177.8 cm (70\")   Wt 84.8 kg (187 lb)   SpO2 96%   BMI 26.83 kg/m²     The following portions of the patient's history were reviewed and updated as appropriate: allergies, current medications, past medical history and problem list.    Review of Systems   Constitutional: Negative.    HENT: Negative.    Eyes: Negative.    Respiratory: Negative.    Cardiovascular: Negative.    Genitourinary: Negative.    Musculoskeletal: Negative.    Skin: Negative.    Neurological: Negative.    Psychiatric/Behavioral: Negative.        Objective   Physical Exam  Vitals signs and nursing note reviewed.   Constitutional:       General: He is not in acute distress.     Appearance: He is well-developed. He is not diaphoretic.   HENT:      Head: " Normocephalic and atraumatic.   Eyes:      Pupils: Pupils are equal, round, and reactive to light.   Neck:      Musculoskeletal: Normal range of motion and neck supple.      Thyroid: No thyromegaly.      Comments: Neck exam negative.  Carotid auscultation normal-no bruits heard.    Cardiovascular:      Rate and Rhythm: Normal rate and regular rhythm.      Heart sounds: Normal heart sounds. No murmur. No friction rub. No gallop.    Pulmonary:      Effort: Pulmonary effort is normal. No respiratory distress.      Breath sounds: Normal breath sounds. No wheezing or rales.   Chest:      Chest wall: No tenderness.   Skin:     General: Skin is warm and dry.      Coloration: Skin is not pale.      Findings: No erythema.   Psychiatric:         Behavior: Behavior normal.         Thought Content: Thought content normal.         Judgment: Judgment normal.         Assessment/Plan   Diagnoses and all orders for this visit:    1. Benign essential hypertension (Primary)  -     Basic Metabolic Panel    2. Type 2 diabetes mellitus without complication, without long-term current use of insulin (CMS/[facility])  -     Basic Metabolic Panel  -     Hemoglobin A1c    3. Mixed hyperlipidemia  -     Basic Metabolic Panel  -     Lipid Panel        Plan: Labs as above for the physical issues as outlined.    Tentative plans are for a subsequent Medicare wellness visit with labs-6 months    Blood pressure elevated on today's visit at 154/80 but the patient feels it may be stress related to his brother's recent death.  He will monitor his blood pressure at home over the next 6 weeks or so and then he was advised to have it rechecked.    For now continue all current treatment as prescribed

## 2021-01-19 ENCOUNTER — TELEPHONE (OUTPATIENT)
Dept: INTERNAL MEDICINE | Age: 71
End: 2021-01-19

## 2021-01-19 DIAGNOSIS — I10 HTN (HYPERTENSION), BENIGN: ICD-10-CM

## 2021-01-19 DIAGNOSIS — E78.2 MIXED HYPERLIPIDEMIA: ICD-10-CM

## 2021-01-19 NOTE — TELEPHONE ENCOUNTER
PATIENT CALLED STATING HIS REFILL WAS DENIED AND THE PHARMACY WILL NOT FILL IT. HE IS A NEW PATIENT OF DR HE, OFFBOARDING FROM DR HAINES. I OFFERED AN APPOINTMENT WITH A NP BUT HE DID NOT WANT TO DO THAT. PLEASE ADVISE PATIENT ON HOW TO KEEP GETTING MEDS REFILLED BEFORE SEEING DR HE.     PLEASE ADVISE: [phone]

## 2021-01-20 RX ORDER — HYDROCHLOROTHIAZIDE 12.5 MG/1
TABLET ORAL
Qty: 180 TABLET | Refills: 0 | Status: SHIPPED | OUTPATIENT
Start: 2021-01-20 | End: 2021-04-13

## 2021-01-20 RX ORDER — SIMVASTATIN 40 MG
40 TABLET ORAL NIGHTLY
Qty: 90 TABLET | Refills: 0 | Status: SHIPPED | OUTPATIENT
Start: 2021-01-20 | End: 2021-06-18

## 2021-03-09 DIAGNOSIS — Z23 IMMUNIZATION DUE: ICD-10-CM

## 2021-04-13 DIAGNOSIS — I10 HTN (HYPERTENSION), BENIGN: ICD-10-CM

## 2021-04-13 RX ORDER — HYDROCHLOROTHIAZIDE 12.5 MG/1
TABLET ORAL
Qty: 180 TABLET | Refills: 0 | Status: SHIPPED | OUTPATIENT
Start: 2021-04-13 | End: 2021-06-18

## 2021-05-18 ENCOUNTER — TELEPHONE (OUTPATIENT)
Dept: INTERNAL MEDICINE | Age: 71
End: 2021-05-18

## 2021-05-18 NOTE — TELEPHONE ENCOUNTER
Caller: HAYES SEARS SR    Relationship to Patient: SELF    Phone Number: [phone]    Reason for Call: PATIENT CALLED TODAY TO UPDATE INSURANCE INFORMATION. AS I WAS UPDATING INFORMATION AND WRITING A NOTE ON HIS NEXT APPOINTMENT, I NOTICED THAT HE WAS SCHEDULED FOR AN OFFICE VISIT WITH DR HE INSTEAD OF A NEW PATIENT APPOINTMENT. PATIENT IS OFF-BOARDING FROM DR HAINES AND NEEDS TO ESTABLISH WITH A NEW PROVIDER. IN January, [facility] RESCHEDULED AN OFFICE VISIT THAT WAS ORIGINALLY WITH JOHANNA CAMPOS TO AN OFFICE VISIT WITH DR HE WITHOUT SCHEDULING AS A NEW PATIENT.     I DID NOT SAY ANYTHING TO PATIENT ABOUT THIS MISTAKE, BUT MADE MY MANAGER AWARE OF THE ERROR AND WAS TOLD TO CALL . SPOKE TO , WAS TOLD SHE WOULD CALL PATIENT TO CANCEL/RESCHEDULE THE APPOINTMENT AND WAS ASKED TO SEND A MESSAGE TO DR HE.     PLEASE ADVISE IF PATIENT CAN COME IN SOONER THEN EARLIEST NEW PATIENT APPOINTMENT IN December.

## 2021-06-18 ENCOUNTER — OFFICE VISIT (OUTPATIENT)
Dept: INTERNAL MEDICINE | Age: 71
End: 2021-06-18

## 2021-06-18 VITALS
SYSTOLIC BLOOD PRESSURE: 154 MMHG | HEART RATE: 64 BPM | TEMPERATURE: 97.7 F | DIASTOLIC BLOOD PRESSURE: 76 MMHG | BODY MASS INDEX: 26.34 KG/M2 | OXYGEN SATURATION: 97 % | HEIGHT: 70 IN | WEIGHT: 184 LBS

## 2021-06-18 DIAGNOSIS — I10 BENIGN ESSENTIAL HYPERTENSION: Primary | ICD-10-CM

## 2021-06-18 DIAGNOSIS — E78.2 MIXED HYPERLIPIDEMIA: ICD-10-CM

## 2021-06-18 DIAGNOSIS — R73.03 PREDIABETES: ICD-10-CM

## 2021-06-18 PROBLEM — G56.03 BILATERAL CARPAL TUNNEL SYNDROME: Status: RESOLVED | Noted: 2020-06-16 | Resolved: 2021-06-18

## 2021-06-18 PROCEDURE — 99214 OFFICE O/P EST MOD 30 MIN: CPT | Performed by: INTERNAL MEDICINE

## 2021-06-18 RX ORDER — LOSARTAN POTASSIUM AND HYDROCHLOROTHIAZIDE 12.5; 5 MG/1; MG/1
1 TABLET ORAL DAILY
Qty: 90 TABLET | Refills: 1 | Status: SHIPPED | OUTPATIENT
Start: 2021-06-18 | End: 2021-06-22 | Stop reason: SDUPTHER

## 2021-06-18 RX ORDER — ATORVASTATIN CALCIUM 40 MG/1
40 TABLET, FILM COATED ORAL NIGHTLY
Qty: 90 TABLET | Refills: 1 | Status: SHIPPED | OUTPATIENT
Start: 2021-06-18 | End: 2021-06-22 | Stop reason: SDUPTHER

## 2021-06-18 NOTE — PROGRESS NOTES
I N T E R N A L  M E D I C I N E  J U N O H  K I M,  M D      ENCOUNTER DATE:  06/18/2021    Jeremi Shankar Sr. / 70 y.o. / male      CHIEF COMPLAINT / REASON FOR OFFICE VISIT     Hypertension (MED REFILL ONLY . dr talamantes pt, establishing care in november ), Hyperlipidemia, and Diabetes      ASSESSMENT & PLAN     Problem List Items Addressed This Visit        High    Benign essential hypertension - Primary (Chronic)    Current Assessment & Plan     Uncontrolled.   Discontinue HCTZ 12.5 mg BID.   Start losartan HCT 50/12.5 mg qd.   [facility] BP link for 4 weeks.      BP Readings from Last 3 Encounters:   06/18/21 154/76   01/27/21 168/80   12/18/20 154/80             Relevant Medications    losartan-hydrochlorothiazide (HYZAAR) 50-12.5 MG per tablet    Other Relevant Orders    [facility] BP Flowsheet    Comprehensive Metabolic Panel    TSH+Free T4    Urinalysis With Microscopic If Indicated (No Culture) - Urine, Clean Catch    Prediabetes (Chronic)    Current Assessment & Plan     He was recorded as diabetic but he does not meet criteria.   Check A1c today. Consider metformin.   Maintain a low sugar/starch/carbohydrate diet and exercise regularly. Wt loss advised.      Lab Results   Component Value Date     (H) 12/18/2020     (H) 06/16/2020     (H) 08/29/2019     Lab Results   Component Value Date    HGBA1C 6.30 (H) 12/18/2020    HGBA1C 6.10 (H) 06/16/2020    HGBA1C 5.90 (H) 08/29/2019             Relevant Orders    Hemoglobin A1c    Hemoglobin A1c    Hyperlipidemia (Chronic)    Current Assessment & Plan     LDL cholesterol is not at goal.   Change simvastatin 40 mg to atorvastatin 40 mg daily.  Schedule labs for 3 months.     Lab Results   Component Value Date     (H) 12/18/2020     (H) 06/16/2020     (H) 08/29/2019    HDL 50 12/18/2020    TRIG 226 (H) 12/18/2020             Relevant Medications    atorvastatin (LIPITOR) 40 MG tablet    Other Relevant Orders    Lipid Panel  "With / Chol / HDL Ratio    Comprehensive Metabolic Panel    Hepatic Function Panel    Lipid Panel With / Chol / HDL Ratio        Orders Placed This Encounter   Procedures   • MyChart BP Flowsheet   • Hemoglobin A1c   • Lipid Panel With / Chol / HDL Ratio   • Comprehensive Metabolic Panel   • TSH+Free T4   • Urinalysis With Microscopic If Indicated (No Culture) - Urine, Clean Catch   • Hepatic Function Panel   • Lipid Panel With / Chol / HDL Ratio   • Hemoglobin A1c     New Medications Ordered This Visit   Medications   • losartan-hydrochlorothiazide (HYZAAR) 50-12.5 MG per tablet     Sig: Take 1 tablet by mouth Daily.     Dispense:  90 tablet     Refill:  1   • atorvastatin (LIPITOR) 40 MG tablet     Sig: Take 1 tablet by mouth Every Night.     Dispense:  90 tablet     Refill:  1       SUMMARY/DISCUSSION  • He has a full 30 min visit in November scheduled for new patient establishment      Next Appointment with me: 11/29/2021    Return for Next scheduled follow up.      VITAL SIGNS     Visit Vitals  /76 (BP Location: Left arm)   Pulse 64   Temp 97.7 °F (36.5 °C)   Ht 177.8 cm (70\")   Wt 83.5 kg (184 lb)   SpO2 97%   BMI 26.40 kg/m²       BP Readings from Last 3 Encounters:   06/18/21 154/76   01/27/21 168/80   12/18/20 154/80     Wt Readings from Last 3 Encounters:   06/18/21 83.5 kg (184 lb)   01/27/21 82.6 kg (182 lb)   12/18/20 84.8 kg (187 lb)     Body mass index is 26.4 kg/m².      MEDICATIONS AT THE TIME OF OFFICE VISIT     Current Outpatient Medications on File Prior to Visit   Medication Sig   • aspirin 81 MG EC tablet Take  by mouth.   • [DISCONTINUED] hydroCHLOROthiazide (HYDRODIURIL) 12.5 MG tablet TAKE 1 EVERY 12 HOURS FOR ELEVATED BLOOD PRESSURE   • [DISCONTINUED] simvastatin (ZOCOR) 40 MG tablet Take 1 tablet by mouth Every Night.         HISTORY OF PRESENT ILLNESS     Former patient of Dr. Jacobs.   History of \"diabetes\" but highest A1c was 6.3, no medication.   Hypertension on HCTZ 12.5 mg BID. " Blood pressure is still high.   Hyperlipidemia on simvastatin LDL cholesterol > 110's.       Patient Care Team:  Jhon Alvarez MD as PCP - General (Internal Medicine)  Ricci Jacobs MD as PCP - Family Medicine    REVIEW OF SYSTEMS     Review of Systems   Constitutional neg except per HPI   Resp neg  CV neg   End neg for polyuria or dipsia     PHYSICAL EXAMINATION     Physical Exam  General: Alert with intact judgment   Cardiovascular Rate: normal. Rhythm: regular. Heart sounds: normal   Pulm/Chest: Effort normal, breath sounds normal.       REVIEWED DATA     Labs:     Lab Results   Component Value Date     (L) 12/18/2020    K 3.9 12/18/2020    CALCIUM 9.9 12/18/2020    AST 22 06/16/2020    ALT 22 06/16/2020    BUN 15 12/18/2020    CREATININE 1.07 12/18/2020    CREATININE 1.14 06/16/2020    CREATININE 1.04 08/29/2019    EGFRIFNONA 68 12/18/2020    EGFRIFAFRI 83 12/18/2020       Lab Results   Component Value Date    HGBA1C 6.30 (H) 12/18/2020    HGBA1C 6.10 (H) 06/16/2020    HGBA1C 5.90 (H) 08/29/2019       Lab Results   Component Value Date     (H) 12/18/2020     (H) 06/16/2020    HDL 50 12/18/2020    TRIG 226 (H) 12/18/2020       Lab Results   Component Value Date    TSH 1.600 06/16/2020    FREET4 1.10 06/16/2020       Lab Results   Component Value Date    WBC 5.73 06/16/2020    HGB 16.0 06/16/2020     06/16/2020         Imaging:           Medical Tests:           Summary of old records / correspondence / consultant report:           Request outside records:             *Examiner was wearing KN95 mask during the entire duration of the visit. Patient was masked the entire time.   Minimum social distance of 6 ft maintained entire visit except if physical contact was necessary as documented.     **Dragon Disclaimer:   Much of this encounter note is an electronic transcription/translation of spoken language to printed text. The electronic translation of spoken language may permit erroneous,  or at times, nonsensical words or phrases to be inadvertently transcribed. Although I have reviewed the note for such errors, some may still exist.     Template created by Yu Alvarez MD

## 2021-06-18 NOTE — ASSESSMENT & PLAN NOTE
Uncontrolled.   Discontinue HCTZ 12.5 mg BID.   Start losartan HCT 50/12.5 mg qd.   [facility] BP link for 4 weeks.      BP Readings from Last 3 Encounters:   06/18/21 154/76   01/27/21 168/80   12/18/20 154/80

## 2021-06-18 NOTE — ASSESSMENT & PLAN NOTE
He was recorded as diabetic but he does not meet criteria.   Check A1c today. Consider metformin.   Maintain a low sugar/starch/carbohydrate diet and exercise regularly. Wt loss advised.      Lab Results   Component Value Date     (H) 12/18/2020     (H) 06/16/2020     (H) 08/29/2019     Lab Results   Component Value Date    HGBA1C 6.30 (H) 12/18/2020    HGBA1C 6.10 (H) 06/16/2020    HGBA1C 5.90 (H) 08/29/2019

## 2021-06-18 NOTE — ASSESSMENT & PLAN NOTE
LDL cholesterol is not at goal.   Change simvastatin 40 mg to atorvastatin 40 mg daily.  Schedule labs for 3 months.     Lab Results   Component Value Date     (H) 12/18/2020     (H) 06/16/2020     (H) 08/29/2019    HDL 50 12/18/2020    TRIG 226 (H) 12/18/2020

## 2021-06-19 LAB
ALBUMIN SERPL-MCNC: 4.7 G/DL (ref 3.5–5.2)
ALBUMIN/GLOB SERPL: 1.9 G/DL
ALP SERPL-CCNC: 74 U/L (ref 39–117)
ALT SERPL-CCNC: 20 U/L (ref 1–41)
APPEARANCE UR: CLEAR
AST SERPL-CCNC: 21 U/L (ref 1–40)
BILIRUB SERPL-MCNC: 0.5 MG/DL (ref 0–1.2)
BILIRUB UR QL STRIP: NEGATIVE
BUN SERPL-MCNC: 18 MG/DL (ref 8–23)
BUN/CREAT SERPL: 17.6 (ref 7–25)
CALCIUM SERPL-MCNC: 9.7 MG/DL (ref 8.6–10.5)
CHLORIDE SERPL-SCNC: 99 MMOL/L (ref 98–107)
CHOLEST SERPL-MCNC: 170 MG/DL (ref 0–200)
CHOLEST/HDLC SERPL: 3.47 {RATIO}
CO2 SERPL-SCNC: 30 MMOL/L (ref 22–29)
COLOR UR: YELLOW
CREAT SERPL-MCNC: 1.02 MG/DL (ref 0.76–1.27)
GLOBULIN SER CALC-MCNC: 2.5 GM/DL
GLUCOSE SERPL-MCNC: 108 MG/DL (ref 65–99)
GLUCOSE UR QL: NEGATIVE
HBA1C MFR BLD: 6.16 % (ref 4.8–5.6)
HDLC SERPL-MCNC: 49 MG/DL (ref 40–60)
HGB UR QL STRIP: NEGATIVE
KETONES UR QL STRIP: NEGATIVE
LDLC SERPL CALC-MCNC: 102 MG/DL (ref 0–100)
LEUKOCYTE ESTERASE UR QL STRIP: NEGATIVE
NITRITE UR QL STRIP: NEGATIVE
PH UR STRIP: 6 [PH] (ref 5–8)
POTASSIUM SERPL-SCNC: 4.2 MMOL/L (ref 3.5–5.2)
PROT SERPL-MCNC: 7.2 G/DL (ref 6–8.5)
PROT UR QL STRIP: NEGATIVE
SODIUM SERPL-SCNC: 140 MMOL/L (ref 136–145)
SP GR UR: 1.01 (ref 1–1.03)
T4 FREE SERPL-MCNC: 1.04 NG/DL (ref 0.93–1.7)
TRIGL SERPL-MCNC: 104 MG/DL (ref 0–150)
TSH SERPL DL<=0.005 MIU/L-ACNC: 1.22 UIU/ML (ref 0.27–4.2)
UROBILINOGEN UR STRIP-MCNC: NORMAL MG/DL
VLDLC SERPL CALC-MCNC: 19 MG/DL (ref 5–40)

## 2021-06-21 DIAGNOSIS — E78.2 MIXED HYPERLIPIDEMIA: ICD-10-CM

## 2021-06-21 RX ORDER — SIMVASTATIN 40 MG
TABLET ORAL
Qty: 90 TABLET | Refills: 0 | OUTPATIENT
Start: 2021-06-21

## 2021-06-22 DIAGNOSIS — I10 BENIGN ESSENTIAL HYPERTENSION: ICD-10-CM

## 2021-06-22 DIAGNOSIS — E78.2 MIXED HYPERLIPIDEMIA: ICD-10-CM

## 2021-06-22 RX ORDER — LOSARTAN POTASSIUM AND HYDROCHLOROTHIAZIDE 12.5; 5 MG/1; MG/1
1 TABLET ORAL DAILY
Qty: 90 TABLET | Refills: 1 | Status: SHIPPED | OUTPATIENT
Start: 2021-06-22 | End: 2021-06-25 | Stop reason: SDUPTHER

## 2021-06-22 RX ORDER — ATORVASTATIN CALCIUM 40 MG/1
40 TABLET, FILM COATED ORAL NIGHTLY
Qty: 90 TABLET | Refills: 1 | Status: SHIPPED | OUTPATIENT
Start: 2021-06-22 | End: 2021-09-09 | Stop reason: SDUPTHER

## 2021-06-25 DIAGNOSIS — I10 BENIGN ESSENTIAL HYPERTENSION: ICD-10-CM

## 2021-06-25 RX ORDER — LOSARTAN POTASSIUM AND HYDROCHLOROTHIAZIDE 12.5; 5 MG/1; MG/1
1 TABLET ORAL DAILY
Qty: 14 TABLET | Refills: 0 | Status: SHIPPED | OUTPATIENT
Start: 2021-06-25 | End: 2021-09-09 | Stop reason: SDUPTHER

## 2021-07-06 DIAGNOSIS — I10 HTN (HYPERTENSION), BENIGN: ICD-10-CM

## 2021-07-06 RX ORDER — HYDROCHLOROTHIAZIDE 12.5 MG/1
TABLET ORAL
Qty: 180 TABLET | Refills: 0 | OUTPATIENT
Start: 2021-07-06

## 2021-09-09 DIAGNOSIS — I10 BENIGN ESSENTIAL HYPERTENSION: ICD-10-CM

## 2021-09-09 DIAGNOSIS — E78.2 MIXED HYPERLIPIDEMIA: ICD-10-CM

## 2021-09-09 RX ORDER — SIMVASTATIN 40 MG
TABLET ORAL
Qty: 90 TABLET | Refills: 0 | OUTPATIENT
Start: 2021-09-09

## 2021-09-09 RX ORDER — ATORVASTATIN CALCIUM 40 MG/1
40 TABLET, FILM COATED ORAL NIGHTLY
Qty: 90 TABLET | Refills: 0 | Status: SHIPPED | OUTPATIENT
Start: 2021-09-09 | End: 2021-11-29 | Stop reason: SDUPTHER

## 2021-09-09 RX ORDER — LOSARTAN POTASSIUM AND HYDROCHLOROTHIAZIDE 12.5; 5 MG/1; MG/1
1 TABLET ORAL DAILY
Qty: 90 TABLET | Refills: 3 | Status: SHIPPED | OUTPATIENT
Start: 2021-09-09 | End: 2021-11-29

## 2021-09-09 NOTE — TELEPHONE ENCOUNTER
LOV  06.18.21 NOV 11.29.21    PATIENT CHANGING TO MAIL ORDER PHARMACY       DO NOT SEE WHERE PT IS TAKING SIMVASTATIN

## 2021-09-21 DIAGNOSIS — E78.2 MIXED HYPERLIPIDEMIA: Primary | ICD-10-CM

## 2021-09-23 RX ORDER — EZETIMIBE 10 MG/1
10 TABLET ORAL DAILY
Qty: 90 TABLET | Refills: 0 | Status: SHIPPED | OUTPATIENT
Start: 2021-09-23 | End: 2021-11-29 | Stop reason: SDUPTHER

## 2021-11-29 ENCOUNTER — OFFICE VISIT (OUTPATIENT)
Dept: INTERNAL MEDICINE | Age: 71
End: 2021-11-29

## 2021-11-29 VITALS
HEART RATE: 76 BPM | HEIGHT: 70 IN | OXYGEN SATURATION: 98 % | TEMPERATURE: 97.1 F | BODY MASS INDEX: 26.77 KG/M2 | SYSTOLIC BLOOD PRESSURE: 140 MMHG | WEIGHT: 187 LBS | DIASTOLIC BLOOD PRESSURE: 74 MMHG

## 2021-11-29 DIAGNOSIS — E78.2 MIXED HYPERLIPIDEMIA: ICD-10-CM

## 2021-11-29 DIAGNOSIS — K63.5 BENIGN COLONIC POLYP: ICD-10-CM

## 2021-11-29 DIAGNOSIS — B35.1 ONYCHOMYCOSIS: ICD-10-CM

## 2021-11-29 DIAGNOSIS — I10 BENIGN ESSENTIAL HYPERTENSION: Primary | ICD-10-CM

## 2021-11-29 DIAGNOSIS — R73.03 PREDIABETES: Chronic | ICD-10-CM

## 2021-11-29 PROCEDURE — G0008 ADMIN INFLUENZA VIRUS VAC: HCPCS | Performed by: INTERNAL MEDICINE

## 2021-11-29 PROCEDURE — 90732 PPSV23 VACC 2 YRS+ SUBQ/IM: CPT | Performed by: INTERNAL MEDICINE

## 2021-11-29 PROCEDURE — G0009 ADMIN PNEUMOCOCCAL VACCINE: HCPCS | Performed by: INTERNAL MEDICINE

## 2021-11-29 PROCEDURE — 90662 IIV NO PRSV INCREASED AG IM: CPT | Performed by: INTERNAL MEDICINE

## 2021-11-29 PROCEDURE — 99214 OFFICE O/P EST MOD 30 MIN: CPT | Performed by: INTERNAL MEDICINE

## 2021-11-29 RX ORDER — CHLORAL HYDRATE 500 MG
CAPSULE ORAL
COMMUNITY
End: 2021-11-29

## 2021-11-29 RX ORDER — EZETIMIBE 10 MG/1
10 TABLET ORAL DAILY
Qty: 90 TABLET | Refills: 3 | Status: SHIPPED | OUTPATIENT
Start: 2021-11-29 | End: 2022-11-28

## 2021-11-29 RX ORDER — ATORVASTATIN CALCIUM 40 MG/1
40 TABLET, FILM COATED ORAL NIGHTLY
Qty: 90 TABLET | Refills: 3 | Status: SHIPPED | OUTPATIENT
Start: 2021-11-29 | End: 2022-11-28

## 2021-11-29 RX ORDER — LOSARTAN POTASSIUM AND HYDROCHLOROTHIAZIDE 12.5; 1 MG/1; MG/1
1 TABLET ORAL DAILY
Qty: 90 TABLET | Refills: 3 | Status: SHIPPED | OUTPATIENT
Start: 2021-11-29 | End: 2022-11-07

## 2021-11-29 NOTE — PATIENT INSTRUCTIONS
** IMPORTANT MESSAGE FROM DR. HE **    In our office, your satisfaction is VERY important to us.     You may receive a survey from [facility] by mail or E-mail for you to provide feedback about your visit. This information is invaluable for me to know what we can do to improve our services.     I ask that you please take a few minutes to complete the survey and let us know how we are doing in serving your needs. (You may receive the survey more than once for multiple visits)    Thank You !    Dr. He & Staff    _________________________________________________________________________________________________________________________      ** ADDITIONAL INSTRUCTION / REMINDERS FROM DR. HE **

## 2021-11-29 NOTE — PROGRESS NOTES
I N T E R N A L  M E D I C I N E  J U N O H  K I M,  M D      ENCOUNTER DATE:  11/29/2021    Jeremi Shankar Sr. / 71 y.o. / male      CHIEF COMPLAINT / REASON FOR OFFICE VISIT     Hypertension, Hyperlipidemia, Prediabetes, and Nail Problem      ASSESSMENT & PLAN     Problem List Items Addressed This Visit        High    Benign essential hypertension - Primary (Chronic)    Current Assessment & Plan     Blood pressure is still little high.   Increase losartan HCT to 100/12.5 mg qd.     BP Readings from Last 3 Encounters:   11/29/21 140/74   06/18/21 154/76   01/27/21 168/80             Relevant Medications    losartan-hydrochlorothiazide (HYZAAR) 100-12.5 MG per tablet    Prediabetes (Chronic)    Current Assessment & Plan     Lab Results   Component Value Date    GLUCOSE 108 (H) 06/18/2021    GLUCOSE 107 (H) 12/18/2020    GLUCOSE 106 (H) 06/16/2020     Lab Results   Component Value Date    HGBA1C 6.20 (H) 09/17/2021    HGBA1C 6.16 (H) 06/18/2021    HGBA1C 6.30 (H) 12/18/2020        Maintain a low sugar/starch/carbohydrate diet and exercise regularly. Wt loss advised.           Hyperlipidemia (Chronic)    Relevant Medications    atorvastatin (LIPITOR) 40 MG tablet    ezetimibe (ZETIA) 10 MG tablet       Low    Onychomycosis    Current Assessment & Plan     He wants to consider treatment but wants to wait until he has his colonoscopy and carpal tunnel surgery completed. He will request prescription for Lamisil at later point.             Unprioritized    History of colon polyps (Chronic)    Current Assessment & Plan     Referral to Dr. Peter Meek ([facility]) per patient's preference for colonoscopy.              Orders Placed This Encounter   Procedures   • Pneumococcal Polysaccharide Vaccine 23-Valent Greater Than or Equal To 1yo Subcutaneous / IM   • Fluzone High-Dose 65+yrs   • Ambulatory Referral For Screening Colonoscopy     New Medications Ordered This Visit   Medications   • losartan-hydrochlorothiazide  "(HYZAAR) 100-12.5 MG per tablet     Sig: Take 1 tablet by mouth Daily.     Dispense:  90 tablet     Refill:  3   • atorvastatin (LIPITOR) 40 MG tablet     Sig: Take 1 tablet by mouth Every Night.     Dispense:  90 tablet     Refill:  3   • ezetimibe (ZETIA) 10 MG tablet     Sig: Take 1 tablet by mouth Daily.     Dispense:  90 tablet     Refill:  3       SUMMARY/DISCUSSION  • Patient has been erroneously marked as diabetic. Based on the available clinical information, he does not have diabetes and should therefore be excluded from diabetic health maintenance and quality measures for the remainder of the reporting period.       Next Appointment with me: Visit date not found    Return in about 6 months (around 5/29/2022) for Reassess chronic medical problems.      VITAL SIGNS     Visit Vitals  /74 (BP Location: Left arm)   Pulse 76   Temp 97.1 °F (36.2 °C)   Ht 177.8 cm (70\")   Wt 84.8 kg (187 lb)   SpO2 98%   BMI 26.83 kg/m²       BP Readings from Last 3 Encounters:   11/29/21 140/74   06/18/21 154/76   01/27/21 168/80     Wt Readings from Last 3 Encounters:   11/29/21 84.8 kg (187 lb)   06/18/21 83.5 kg (184 lb)   01/27/21 82.6 kg (182 lb)     Body mass index is 26.83 kg/m².      MEDICATIONS AT THE TIME OF OFFICE VISIT     Current Outpatient Medications   Medication Instructions   • atorvastatin (LIPITOR) 40 mg, Oral, Nightly   • ezetimibe (ZETIA) 10 mg, Oral, Daily   • losartan-hydrochlorothiazide (HYZAAR) 50-12.5 MG per tablet 1 tablet, Oral, Daily       ECASA 81 mg qd  Omega 3 fish oil 100 mg       HISTORY OF PRESENT ILLNESS     Blood pressure remains > 140 here/home. No side effects from prior medication change.   Denies significant problems with change to atorvastatin. Also on Zetia. LDL cholesterol is improved.   Prediabetes A1c remains stable at ~6.2   Complains of chronic toenail fungus.       Patient Care Team:  Jhon Alvarez MD as PCP - General (Internal Medicine)    REVIEW OF SYSTEMS     Review of " Systems   Constitutional neg except per HPI   Resp neg  CV neg   GI neg  MuSk neg for myalgia     PHYSICAL EXAMINATION     Physical Exam  Overweight   Bilateral big toe onychomycosis       REVIEWED DATA     Labs:     Lab Results   Component Value Date     06/18/2021    K 4.2 06/18/2021    CALCIUM 9.7 06/18/2021    AST 23 09/17/2021    ALT 22 09/17/2021    BUN 18 06/18/2021    CREATININE 1.02 06/18/2021    CREATININE 1.07 12/18/2020    CREATININE 1.14 06/16/2020    EGFRIFNONA 72 06/18/2021    EGFRIFAFRI 87 06/18/2021       Lab Results   Component Value Date    HGBA1C 6.20 (H) 09/17/2021    HGBA1C 6.16 (H) 06/18/2021    HGBA1C 6.30 (H) 12/18/2020       Lab Results   Component Value Date    LDL 84 11/22/2021     (H) 09/17/2021    HDL 48 11/22/2021    TRIG 136 11/22/2021       Lab Results   Component Value Date    TSH 1.220 06/18/2021    TSH 1.600 06/16/2020    FREET4 1.04 06/18/2021    FREET4 1.10 06/16/2020       Lab Results   Component Value Date    WBC 5.73 06/16/2020    HGB 16.0 06/16/2020     06/16/2020       Lab Results   Component Value Date    MICROALBUR 3.1 08/29/2019           Imaging:           Medical Tests:           Summary of old records / correspondence / consultant report:           Request outside records:             *Examiner was wearing KN95 mask and eye protection during the entire duration of the visit. Patient was masked the entire time. Minimum social distance of 6 ft maintained entire visit except if physical contact was necessary as documented.     **Dragon Disclaimer: Much of this encounter note is an electronic transcription/translation of spoken language to printed text. The electronic translation of spoken language may permit erroneous, or at times, nonsensical words or phrases to be inadvertently transcribed. Although I have reviewed the note for such errors, some may still exist.       Template created by Yu Alvarez MD

## 2021-12-03 ENCOUNTER — PATIENT ROUNDING (BHMG ONLY) (OUTPATIENT)
Dept: INTERNAL MEDICINE | Age: 71
End: 2021-12-03

## 2022-01-13 DIAGNOSIS — E78.2 MIXED HYPERLIPIDEMIA: ICD-10-CM

## 2022-01-13 RX ORDER — ATORVASTATIN CALCIUM 40 MG/1
40 TABLET, FILM COATED ORAL NIGHTLY
Qty: 90 TABLET | Refills: 3 | Status: CANCELLED | OUTPATIENT
Start: 2022-01-13

## 2022-06-01 ENCOUNTER — OFFICE VISIT (OUTPATIENT)
Dept: INTERNAL MEDICINE | Age: 72
End: 2022-06-01

## 2022-06-01 VITALS
HEIGHT: 70 IN | TEMPERATURE: 97.7 F | SYSTOLIC BLOOD PRESSURE: 132 MMHG | BODY MASS INDEX: 26.63 KG/M2 | WEIGHT: 186 LBS | OXYGEN SATURATION: 98 % | HEART RATE: 60 BPM | DIASTOLIC BLOOD PRESSURE: 80 MMHG

## 2022-06-01 DIAGNOSIS — R73.03 PREDIABETES: Chronic | ICD-10-CM

## 2022-06-01 DIAGNOSIS — I10 BENIGN ESSENTIAL HYPERTENSION: Primary | Chronic | ICD-10-CM

## 2022-06-01 DIAGNOSIS — E78.2 MIXED HYPERLIPIDEMIA: Chronic | ICD-10-CM

## 2022-06-01 DIAGNOSIS — Z12.5 ENCOUNTER FOR SCREENING FOR MALIGNANT NEOPLASM OF PROSTATE: ICD-10-CM

## 2022-06-01 PROCEDURE — 99214 OFFICE O/P EST MOD 30 MIN: CPT | Performed by: INTERNAL MEDICINE

## 2022-06-01 NOTE — ASSESSMENT & PLAN NOTE
Check A1c today.  Maintain a low sugar/starch/carbohydrate diet.  Consider metformin if A1c is higher.  Lab Results   Component Value Date    HGBA1C 6.20 (H) 09/17/2021    HGBA1C 6.16 (H) 06/18/2021    HGBA1C 6.30 (H) 12/18/2020

## 2022-06-01 NOTE — PROGRESS NOTES
I N T E R N A L  M E D I C I N E  J U N O H  K I M,  M D      ENCOUNTER DATE:  06/01/2022    Jeremi Davidsontyra Gaytan. / 71 y.o. / male      CHIEF COMPLAINT / REASON FOR OFFICE VISIT     Hypertension, Hyperlipidemia, and Prediabetes      ASSESSMENT & PLAN     Problem List Items Addressed This Visit        High    Benign essential hypertension - Primary (Chronic)    Current Assessment & Plan     Blood pressure is improved.  Continue losartan hydrochlorothiazide 100-12.5 mg daily           Relevant Medications    losartan-hydrochlorothiazide (HYZAAR) 100-12.5 MG per tablet    Other Relevant Orders    Comprehensive Metabolic Panel    Prediabetes (Chronic)    Current Assessment & Plan     Check A1c today.  Maintain a low sugar/starch/carbohydrate diet.  Consider metformin if A1c is higher.  Lab Results   Component Value Date    HGBA1C 6.20 (H) 09/17/2021    HGBA1C 6.16 (H) 06/18/2021    HGBA1C 6.30 (H) 12/18/2020               Relevant Orders    Comprehensive Metabolic Panel    Hemoglobin A1c    Hyperlipidemia (Chronic)    Overview     Continue atorvastatin 40 mg and Zetia 10 mg daily           Current Assessment & Plan     Cholesterol is improved with addition of Zetia 10 mg.  Lab Results   Component Value Date    LDL 84 11/22/2021     (H) 09/17/2021     (H) 06/18/2021     Lab Results   Component Value Date    HDL 48 11/22/2021    HDL 49 09/17/2021    HDL 49 06/18/2021     Lab Results   Component Value Date    TRIG 136 11/22/2021    TRIG 147 09/17/2021    TRIG 104 06/18/2021     Lab Results   Component Value Date    CHOLHDLRATIO 3.3 11/22/2021    CHOLHDLRATIO 3.94 09/17/2021    CHOLHDLRATIO 3.47 06/18/2021               Relevant Medications    atorvastatin (LIPITOR) 40 MG tablet    ezetimibe (ZETIA) 10 MG tablet    Other Relevant Orders    Comprehensive Metabolic Panel      Other Visit Diagnoses     Encounter for screening for malignant neoplasm of prostate        Relevant Orders    PSA Screen     "    Orders Placed This Encounter   Procedures   • Comprehensive Metabolic Panel   • Hemoglobin A1c   • PSA Screen     No orders of the defined types were placed in this encounter.      SUMMARY/DISCUSSION  •       Next Appointment with me: Visit date not found    Return in about 6 months (around 12/1/2022) for COMBINED AWV AND MEDICAL F/U (30 MIN).      VITAL SIGNS     Vitals:    06/01/22 1042   BP: 132/80   BP Location: Left arm   Pulse: 60   Temp: 97.7 °F (36.5 °C)   SpO2: 98%   Weight: 84.4 kg (186 lb)   Height: 177.8 cm (70\")       BP Readings from Last 3 Encounters:   06/01/22 132/80   11/29/21 140/74   06/18/21 154/76     Wt Readings from Last 3 Encounters:   06/01/22 84.4 kg (186 lb)   11/29/21 84.8 kg (187 lb)   06/18/21 83.5 kg (184 lb)     Body mass index is 26.69 kg/m².    Blood pressure readings recorded on patient flowsheet:  No flowsheet data found.       MEDICATIONS AT THE TIME OF OFFICE VISIT     Current Outpatient Medications on File Prior to Visit   Medication Sig   • atorvastatin (LIPITOR) 40 MG tablet Take 1 tablet by mouth Every Night.   • ezetimibe (ZETIA) 10 MG tablet Take 1 tablet by mouth Daily.   • losartan-hydrochlorothiazide (HYZAAR) 100-12.5 MG per tablet Take 1 tablet by mouth Daily.     No current facility-administered medications on file prior to visit.          HISTORY OF PRESENT ILLNESS     Previously increased dose of losartan hydrochlorothiazide to 100/12.5 mg.  Blood pressure looks better.  He denies any significant side effects.  Zetia was added to atorvastatin for hyperlipidemia and LDL has improved.  He denies any significant side effects.  He has prediabetes with most recent A1c level of 6.2.      Patient Care Team:  Jhon Alvarez MD as PCP - General (Internal Medicine)    REVIEW OF SYSTEMS     Review of Systems       PHYSICAL EXAMINATION     Physical Exam  General: No acute distress  Psych: Normal thought and judgment   Cardiovascular Rate: normal. Rhythm: regular. Heart " sounds: normal.       REVIEWED DATA     Labs:     Lab Results   Component Value Date     06/18/2021    K 4.2 06/18/2021    CALCIUM 9.7 06/18/2021    AST 23 09/17/2021    ALT 22 09/17/2021    BUN 18 06/18/2021    CREATININE 1.02 06/18/2021    CREATININE 1.07 12/18/2020    CREATININE 1.14 06/16/2020    EGFRIFNONA 72 06/18/2021    EGFRIFAFRI 87 06/18/2021       Lab Results   Component Value Date    HGBA1C 6.20 (H) 09/17/2021    HGBA1C 6.16 (H) 06/18/2021    HGBA1C 6.30 (H) 12/18/2020       Lab Results   Component Value Date    LDL 84 11/22/2021     (H) 09/17/2021     (H) 06/18/2021    HDL 48 11/22/2021    HDL 49 09/17/2021    TRIG 136 11/22/2021    TRIG 147 09/17/2021       Lab Results   Component Value Date    TSH 1.220 06/18/2021    TSH 1.600 06/16/2020    FREET4 1.04 06/18/2021    FREET4 1.10 06/16/2020       Lab Results   Component Value Date    WBC 5.73 06/16/2020    HGB 16.0 06/16/2020     06/16/2020     Lab Results   Component Value Date    PSA 1.200 06/16/2020    PSA 1.030 09/20/2017    PSA 1.200 09/21/2016      No results found for: MALBCRERATIO       Imaging:           Medical Tests:           Summary of old records / correspondence / consultant report:           Request outside records:             *Examiner was wearing KN95 mask and eye protection during the entire duration of the visit. Patient was masked the entire time. Minimum social distance of 6 ft maintained entire visit except if physical contact was necessary as documented.       Template created by Yu Alvarez MD

## 2022-06-01 NOTE — ASSESSMENT & PLAN NOTE
Cholesterol is improved with addition of Zetia 10 mg.  Lab Results   Component Value Date    LDL 84 11/22/2021     (H) 09/17/2021     (H) 06/18/2021     Lab Results   Component Value Date    HDL 48 11/22/2021    HDL 49 09/17/2021    HDL 49 06/18/2021     Lab Results   Component Value Date    TRIG 136 11/22/2021    TRIG 147 09/17/2021    TRIG 104 06/18/2021     Lab Results   Component Value Date    CHOLHDLRATIO 3.3 11/22/2021    CHOLHDLRATIO 3.94 09/17/2021    CHOLHDLRATIO 3.47 06/18/2021

## 2022-06-02 LAB
ALBUMIN SERPL-MCNC: 4.7 G/DL (ref 3.7–4.7)
ALBUMIN/GLOB SERPL: 2 {RATIO} (ref 1.2–2.2)
ALP SERPL-CCNC: 75 IU/L (ref 44–121)
ALT SERPL-CCNC: 19 IU/L (ref 0–44)
AST SERPL-CCNC: 23 IU/L (ref 0–40)
BILIRUB SERPL-MCNC: 0.6 MG/DL (ref 0–1.2)
BUN SERPL-MCNC: 16 MG/DL (ref 8–27)
BUN/CREAT SERPL: 16 (ref 10–24)
CALCIUM SERPL-MCNC: 9.9 MG/DL (ref 8.6–10.2)
CHLORIDE SERPL-SCNC: 103 MMOL/L (ref 96–106)
CO2 SERPL-SCNC: 23 MMOL/L (ref 20–29)
CREAT SERPL-MCNC: 0.98 MG/DL (ref 0.76–1.27)
EGFRCR SERPLBLD CKD-EPI 2021: 82 ML/MIN/1.73
GLOBULIN SER CALC-MCNC: 2.3 G/DL (ref 1.5–4.5)
GLUCOSE SERPL-MCNC: 102 MG/DL (ref 65–99)
HBA1C MFR BLD: 6.3 % (ref 4.8–5.6)
POTASSIUM SERPL-SCNC: 4.2 MMOL/L (ref 3.5–5.2)
PROT SERPL-MCNC: 7 G/DL (ref 6–8.5)
PSA SERPL-MCNC: 1.3 NG/ML (ref 0–4)
SODIUM SERPL-SCNC: 141 MMOL/L (ref 134–144)

## 2022-06-03 ENCOUNTER — TELEPHONE (OUTPATIENT)
Dept: INTERNAL MEDICINE | Age: 72
End: 2022-06-03

## 2022-06-03 NOTE — TELEPHONE ENCOUNTER
----- Message from Jhon Alvarez MD sent at 6/3/2022  6:49 AM EDT -----  CALL PATIENT with results:    1) A1c level for blood sugar average is higher at 6.3 which is very close to diabetic range. Recommend metformin  mg daily with supper (prediabetes).     2) PSA (for prostate cancer screening) overall rising slowly. Check annually.     Followup in 3 months if possible for specifically for prediabetes and POC A1c.     Keep other future appointments.

## 2022-06-03 NOTE — PROGRESS NOTES
CALL PATIENT with results:    1) A1c level for blood sugar average is higher at 6.3 which is very close to diabetic range. Recommend metformin  mg daily with supper (prediabetes).     2) PSA (for prostate cancer screening) overall rising slowly. Check annually.     Followup in 3 months if possible for specifically for prediabetes and POC A1c.     Keep other future appointments.

## 2022-07-25 ENCOUNTER — HOSPITAL ENCOUNTER (OUTPATIENT)
Dept: GENERAL RADIOLOGY | Facility: HOSPITAL | Age: 72
Discharge: HOME | End: 2022-07-25

## 2022-07-25 ENCOUNTER — OFFICE VISIT (OUTPATIENT)
Dept: INTERNAL MEDICINE | Age: 72
End: 2022-07-25

## 2022-07-25 VITALS
SYSTOLIC BLOOD PRESSURE: 124 MMHG | HEIGHT: 70 IN | TEMPERATURE: 97.7 F | BODY MASS INDEX: 26.77 KG/M2 | OXYGEN SATURATION: 98 % | WEIGHT: 187 LBS | HEART RATE: 70 BPM | DIASTOLIC BLOOD PRESSURE: 80 MMHG

## 2022-07-25 DIAGNOSIS — M25.511 LOCALIZED PAIN OF RIGHT SHOULDER JOINT: Primary | ICD-10-CM

## 2022-07-25 DIAGNOSIS — M25.511 ACUTE PAIN OF RIGHT SHOULDER: Primary | ICD-10-CM

## 2022-07-25 PROCEDURE — 99213 OFFICE O/P EST LOW 20 MIN: CPT

## 2022-07-25 PROCEDURE — 73030 X-RAY EXAM OF SHOULDER: CPT

## 2022-07-25 RX ORDER — CYCLOBENZAPRINE HCL 10 MG
10 TABLET ORAL NIGHTLY PRN
Qty: 30 TABLET | Refills: 0 | Status: SHIPPED | OUTPATIENT
Start: 2022-07-25 | End: 2022-12-09

## 2022-07-25 NOTE — PROGRESS NOTES
"    I N T E R N A L  M E D I C I N E  Rosa M Courtney, APRN    ENCOUNTER DATE:  07/25/2022    Jeremi GARDNER Raad Gaytan. / 71 y.o. / male      CHIEF COMPLAINT / REASON FOR OFFICE VISIT     Shoulder Injury (right)      ASSESSMENT & PLAN     Diagnoses and all orders for this visit:    1. Acute pain of right shoulder (Primary)  -     XR Shoulder 2+ View Right  -     cyclobenzaprine (FLEXERIL) 10 MG tablet; Take 1 tablet by mouth At Night As Needed for Muscle Spasms for up to 30 doses.  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  Pt educated on rest, ice, gentle stretching.  Pt may take tylenol/ ibuprofen PRN for pain.  Will trial flexeril at night to help with sleep.  Pt educated that is not safe to drive while taking medication. Educated that it is not safe to take narcotics that are not prescribed to him.  • Will consider MRI and PT referral after reviewing XR.    • Pt to return for any worsening or non improving symptoms.        Next Appointment with me: Visit date not found    Return for Next scheduled follow up.      VITAL SIGNS     Visit Vitals  /80   Pulse 70   Temp 97.7 °F (36.5 °C) (Temporal)   Ht 177.8 cm (70\")   Wt 84.8 kg (187 lb)   SpO2 98%   BMI 26.83 kg/m²           @BP@  Wt Readings from Last 3 Encounters:   07/25/22 84.8 kg (187 lb)   06/01/22 84.4 kg (186 lb)   11/29/21 84.8 kg (187 lb)     Body mass index is 26.83 kg/m².        MEDICATIONS AT THE TIME OF OFFICE VISIT     Current Outpatient Medications on File Prior to Visit   Medication Sig Dispense Refill   • atorvastatin (LIPITOR) 40 MG tablet Take 1 tablet by mouth Every Night. 90 tablet 3   • ezetimibe (ZETIA) 10 MG tablet Take 1 tablet by mouth Daily. 90 tablet 3   • losartan-hydrochlorothiazide (HYZAAR) 100-12.5 MG per tablet Take 1 tablet by mouth Daily. 90 tablet 3     No current facility-administered medications on file prior to visit.        HISTORY OF PRESENT ILLNESS     R shoulder pain x 3 " days.  He is R handed.  Has been very physical active recently, working on his tractor a lot.  Also recently completed weed trimming on his [place] property.  Reports he first noticed R shoulder pain while sitting at home.  Does not recall any trauma or injury.  No swelling, redness, fever/ chills.      Has tried ice packs, and an OTC cream of unknown name without much improvement.   Rates pain currently as 2/10 but is significantly worse at night and it is difficult for him to sleep.  He has taken Percocet from his wife's prescription the last two nights.         Patient Care Team:  Jhon Alvarez MD as PCP - General (Internal Medicine)    REVIEW OF SYSTEMS     Review of Systems   Constitutional: Negative for chills, fatigue and fever.   Eyes: Negative for visual disturbance.   Respiratory: Negative for cough, chest tightness, shortness of breath and wheezing.    Musculoskeletal: Positive for arthralgias (R shoulder). Negative for back pain, neck pain and neck stiffness.   Skin: Negative for color change.   Neurological: Negative for dizziness, weakness, light-headedness and headaches.          PHYSICAL EXAMINATION     Physical Exam  Vitals reviewed.   Constitutional:       General: He is not in acute distress.     Appearance: Normal appearance. He is not ill-appearing, toxic-appearing or diaphoretic.   HENT:      Head: Normocephalic and atraumatic.   Cardiovascular:      Rate and Rhythm: Normal rate and regular rhythm.      Pulses: Normal pulses.      Heart sounds: Normal heart sounds.   Pulmonary:      Effort: Pulmonary effort is normal.      Breath sounds: Normal breath sounds.   Musculoskeletal:         General: No swelling or deformity.      Right shoulder: Tenderness (R AC joint) present. No swelling, deformity or effusion. Decreased range of motion (due to pain). Decreased strength. Normal pulse.   Skin:     General: Skin is warm and dry.   Neurological:      Mental Status: He is alert and oriented to  person, place, and time. Mental status is at baseline.   Psychiatric:         Mood and Affect: Mood normal.         Behavior: Behavior normal.         Thought Content: Thought content normal.         Judgment: Judgment normal.           REVIEWED DATA     Labs:           Imaging:            Medical Tests:           Summary of old records / correspondence / consultant report:           Request outside records:

## 2022-08-02 DIAGNOSIS — M25.511 ACUTE PAIN OF RIGHT SHOULDER: Primary | ICD-10-CM

## 2022-08-06 ENCOUNTER — HOSPITAL ENCOUNTER (OUTPATIENT)
Dept: MRI IMAGING | Facility: HOSPITAL | Age: 72
Discharge: HOME | End: 2022-08-06

## 2022-08-06 DIAGNOSIS — M25.511 LOCALIZED PAIN OF RIGHT SHOULDER JOINT: ICD-10-CM

## 2022-08-06 PROCEDURE — 73221 MRI JOINT UPR EXTREM W/O DYE: CPT

## 2022-09-13 ENCOUNTER — OFFICE VISIT (OUTPATIENT)
Dept: INTERNAL MEDICINE | Age: 72
End: 2022-09-13

## 2022-09-13 VITALS
SYSTOLIC BLOOD PRESSURE: 142 MMHG | HEIGHT: 70 IN | TEMPERATURE: 97.7 F | HEART RATE: 64 BPM | BODY MASS INDEX: 25.91 KG/M2 | OXYGEN SATURATION: 99 % | WEIGHT: 181 LBS | DIASTOLIC BLOOD PRESSURE: 72 MMHG

## 2022-09-13 DIAGNOSIS — R73.03 PREDIABETES: Primary | ICD-10-CM

## 2022-09-13 DIAGNOSIS — I10 PRIMARY HYPERTENSION: Chronic | ICD-10-CM

## 2022-09-13 LAB
EXPIRATION DATE: NORMAL
HBA1C MFR BLD: 6.2 %
Lab: NORMAL

## 2022-09-13 PROCEDURE — 83036 HEMOGLOBIN GLYCOSYLATED A1C: CPT | Performed by: INTERNAL MEDICINE

## 2022-09-13 PROCEDURE — 99214 OFFICE O/P EST MOD 30 MIN: CPT | Performed by: INTERNAL MEDICINE

## 2022-09-13 PROCEDURE — 3044F HG A1C LEVEL LT 7.0%: CPT | Performed by: INTERNAL MEDICINE

## 2022-09-13 RX ORDER — METFORMIN HYDROCHLORIDE 500 MG/1
TABLET, EXTENDED RELEASE ORAL
Qty: 30 TABLET | Refills: 3 | Status: SHIPPED | OUTPATIENT
Start: 2022-09-13 | End: 2022-12-06

## 2022-09-13 NOTE — PROGRESS NOTES
"    I N T E R N A L  M E D I C I N E  J U N O H  K I M,  M D      ENCOUNTER DATE:  09/13/2022    Jeremi Shankar Sr. / 71 y.o. / male      CHIEF COMPLAINT / REASON FOR OFFICE VISIT     Prediabetes      ASSESSMENT & PLAN     Problem List Items Addressed This Visit        High    Prediabetes - Primary (Chronic)    Current Assessment & Plan     Did not start metformin previously (communication issue).   A1c today is 6.2.   Start metformin  mg with supper.   Maintain a low sugar/starch/carbohydrate diet.   Has follow-up in December, will recheck then.     Lab Results   Component Value Date    HGBA1C 6.2 09/13/2022    HGBA1C 6.3 (H) 06/01/2022    HGBA1C 6.20 (H) 09/17/2021             Relevant Medications    metFORMIN ER (GLUCOPHAGE-XR) 500 MG 24 hr tablet    Other Relevant Orders    POC Glycosylated Hemoglobin (Hb A1C) (Completed)       Medium    Primary hypertension (Chronic)    Current Assessment & Plan     Blood pressure is marginally elevated here and home.  Continue losartan hydrochlorothiazide 100/12.5 mg daily for now.  Maintain low-sodium diet.  Will follow-up with hypertension on next visit in December.    BP Readings from Last 3 Encounters:   09/13/22 142/72   07/25/22 124/80   06/01/22 132/80             Relevant Medications    losartan-hydrochlorothiazide (HYZAAR) 100-12.5 MG per tablet        Orders Placed This Encounter   Procedures   • POC Glycosylated Hemoglobin (Hb A1C)     New Medications Ordered This Visit   Medications   • metFORMIN ER (GLUCOPHAGE-XR) 500 MG 24 hr tablet     Sig: Take 1 tablet with dinner.     Dispense:  30 tablet     Refill:  3       SUMMARY/DISCUSSION  •       Next Appointment with me: 12/9/2022    Return for Next scheduled follow up.      VITAL SIGNS     Vitals:    09/13/22 1321   BP: 142/72   BP Location: Left arm   Pulse: 64   Temp: 97.7 °F (36.5 °C)   SpO2: 99%   Weight: 82.1 kg (181 lb)   Height: 177.8 cm (70\")       BP Readings from Last 3 Encounters:   09/13/22 " 142/72   07/25/22 124/80   06/01/22 132/80     Wt Readings from Last 3 Encounters:   09/13/22 82.1 kg (181 lb)   07/25/22 84.8 kg (187 lb)   06/01/22 84.4 kg (186 lb)     Body mass index is 25.97 kg/m².    Blood pressure readings recorded on patient flowsheet:  No flowsheet data found.       MEDICATIONS AT THE TIME OF OFFICE VISIT     Current Outpatient Medications on File Prior to Visit   Medication Sig   • atorvastatin (LIPITOR) 40 MG tablet Take 1 tablet by mouth Every Night.   • ezetimibe (ZETIA) 10 MG tablet Take 1 tablet by mouth Daily.   • losartan-hydrochlorothiazide (HYZAAR) 100-12.5 MG per tablet Take 1 tablet by mouth Daily.   • cyclobenzaprine (FLEXERIL) 10 MG tablet Take 1 tablet by mouth At Night As Needed for Muscle Spasms for up to 30 doses.     No current facility-administered medications on file prior to visit.          HISTORY OF PRESENT ILLNESS     Patient states he has not received a prescription for metformin as previously recommended.  He does feel like there are significant areas of improvement he can make in his diet.  He does report to eating excessive amounts of sweet food items.  His previous A1c was 6.3 and today's A1c is 6.2.  Blood pressure is generally above 140 at home and he reports compliance with losartan hydrochlorothiazide 100/12.5 mg.      Patient Care Team:  Jhon Alvarez MD as PCP - General (Internal Medicine)    REVIEW OF SYSTEMS     Review of Systems       PHYSICAL EXAMINATION     Physical Exam  General: No acute distress  Psych: Normal thought and judgment       REVIEWED DATA     Labs:     Lab Results   Component Value Date     06/01/2022    K 4.2 06/01/2022    CALCIUM 9.9 06/01/2022    AST 23 06/01/2022    ALT 19 06/01/2022    BUN 16 06/01/2022    CREATININE 0.98 06/01/2022    CREATININE 1.02 06/18/2021    CREATININE 1.07 12/18/2020    EGFRIFNONA 72 06/18/2021    EGFRIFAFRI 87 06/18/2021       Lab Results   Component Value Date    HGBA1C 6.2 09/13/2022    HGBA1C 6.3  (H) 06/01/2022    HGBA1C 6.20 (H) 09/17/2021       Lab Results   Component Value Date    LDL 84 11/22/2021     (H) 09/17/2021     (H) 06/18/2021    HDL 48 11/22/2021    HDL 49 09/17/2021    TRIG 136 11/22/2021    TRIG 147 09/17/2021       Lab Results   Component Value Date    TSH 1.220 06/18/2021    TSH 1.600 06/16/2020    FREET4 1.04 06/18/2021    FREET4 1.10 06/16/2020       Lab Results   Component Value Date    WBC 5.73 06/16/2020    HGB 16.0 06/16/2020     06/16/2020       No results found for: MALBCRERATIO       Imaging:           Medical Tests:           Summary of old records / correspondence / consultant report:           Request outside records:             *Examiner was wearing KN95 mask and eye protection during the entire duration of the visit. Patient was masked the entire time. Minimum social distance of 6 ft maintained entire visit except if physical contact was necessary as documented.       Template created by Yu Alvarez MD

## 2022-09-13 NOTE — ASSESSMENT & PLAN NOTE
Did not start metformin previously (communication issue).   A1c today is 6.2.   Start metformin  mg with supper.   Maintain a low sugar/starch/carbohydrate diet.   Has follow-up in December, will recheck then.     Lab Results   Component Value Date    HGBA1C 6.2 09/13/2022    HGBA1C 6.3 (H) 06/01/2022    HGBA1C 6.20 (H) 09/17/2021       No

## 2022-09-13 NOTE — ASSESSMENT & PLAN NOTE
Baby resp 64, baby retracting & mild grunting. MFarmer RN Nursery shown baby ~ baby temp 98.8 and placed under warmer. Dr Triana Comes here to examine baby and stated,\"Mother may go to mother. \" Blood pressure is marginally elevated here and home.  Continue losartan hydrochlorothiazide 100/12.5 mg daily for now.  Maintain low-sodium diet.  Will follow-up with hypertension on next visit in December.    BP Readings from Last 3 Encounters:   09/13/22 142/72   07/25/22 124/80   06/01/22 132/80

## 2022-11-07 DIAGNOSIS — I10 BENIGN ESSENTIAL HYPERTENSION: ICD-10-CM

## 2022-11-07 RX ORDER — LOSARTAN POTASSIUM AND HYDROCHLOROTHIAZIDE 12.5; 1 MG/1; MG/1
TABLET ORAL
Qty: 90 TABLET | Refills: 3 | Status: SHIPPED | OUTPATIENT
Start: 2022-11-07

## 2022-11-24 DIAGNOSIS — E78.2 MIXED HYPERLIPIDEMIA: ICD-10-CM

## 2022-11-28 RX ORDER — ATORVASTATIN CALCIUM 40 MG/1
TABLET, FILM COATED ORAL
Qty: 90 TABLET | Refills: 3 | Status: SHIPPED | OUTPATIENT
Start: 2022-11-28 | End: 2022-11-29 | Stop reason: SDUPTHER

## 2022-11-28 RX ORDER — EZETIMIBE 10 MG/1
TABLET ORAL
Qty: 90 TABLET | Refills: 3 | Status: SHIPPED | OUTPATIENT
Start: 2022-11-28 | End: 2022-11-29 | Stop reason: SDUPTHER

## 2022-11-29 DIAGNOSIS — E78.2 MIXED HYPERLIPIDEMIA: ICD-10-CM

## 2022-11-29 RX ORDER — EZETIMIBE 10 MG/1
10 TABLET ORAL DAILY
Qty: 90 TABLET | Refills: 3 | Status: SHIPPED | OUTPATIENT
Start: 2022-11-29

## 2022-11-29 RX ORDER — ATORVASTATIN CALCIUM 40 MG/1
40 TABLET, FILM COATED ORAL NIGHTLY
Qty: 90 TABLET | Refills: 3 | Status: SHIPPED | OUTPATIENT
Start: 2022-11-29

## 2022-12-05 DIAGNOSIS — R73.03 PREDIABETES: ICD-10-CM

## 2022-12-06 RX ORDER — METFORMIN HYDROCHLORIDE 500 MG/1
TABLET, EXTENDED RELEASE ORAL
Qty: 90 TABLET | Refills: 1 | Status: SHIPPED | OUTPATIENT
Start: 2022-12-06 | End: 2023-03-03 | Stop reason: SDUPTHER

## 2022-12-09 ENCOUNTER — OFFICE VISIT (OUTPATIENT)
Dept: INTERNAL MEDICINE | Age: 72
End: 2022-12-09

## 2022-12-09 VITALS
OXYGEN SATURATION: 98 % | HEART RATE: 64 BPM | TEMPERATURE: 97.5 F | HEIGHT: 70 IN | WEIGHT: 181 LBS | DIASTOLIC BLOOD PRESSURE: 70 MMHG | SYSTOLIC BLOOD PRESSURE: 140 MMHG | BODY MASS INDEX: 25.91 KG/M2

## 2022-12-09 DIAGNOSIS — Z00.00 MEDICARE ANNUAL WELLNESS VISIT, SUBSEQUENT: Primary | ICD-10-CM

## 2022-12-09 DIAGNOSIS — R73.03 PREDIABETES: Chronic | ICD-10-CM

## 2022-12-09 DIAGNOSIS — I35.0 NONRHEUMATIC AORTIC VALVE STENOSIS: ICD-10-CM

## 2022-12-09 DIAGNOSIS — I10 PRIMARY HYPERTENSION: Chronic | ICD-10-CM

## 2022-12-09 PROCEDURE — 1159F MED LIST DOCD IN RCRD: CPT | Performed by: INTERNAL MEDICINE

## 2022-12-09 PROCEDURE — 90662 IIV NO PRSV INCREASED AG IM: CPT | Performed by: INTERNAL MEDICINE

## 2022-12-09 PROCEDURE — 1170F FXNL STATUS ASSESSED: CPT | Performed by: INTERNAL MEDICINE

## 2022-12-09 PROCEDURE — 96160 PT-FOCUSED HLTH RISK ASSMT: CPT | Performed by: INTERNAL MEDICINE

## 2022-12-09 PROCEDURE — 90677 PCV20 VACCINE IM: CPT | Performed by: INTERNAL MEDICINE

## 2022-12-09 PROCEDURE — G0008 ADMIN INFLUENZA VIRUS VAC: HCPCS | Performed by: INTERNAL MEDICINE

## 2022-12-09 PROCEDURE — 99214 OFFICE O/P EST MOD 30 MIN: CPT | Performed by: INTERNAL MEDICINE

## 2022-12-09 PROCEDURE — G0439 PPPS, SUBSEQ VISIT: HCPCS | Performed by: INTERNAL MEDICINE

## 2022-12-09 PROCEDURE — G0009 ADMIN PNEUMOCOCCAL VACCINE: HCPCS | Performed by: INTERNAL MEDICINE

## 2022-12-09 NOTE — ASSESSMENT & PLAN NOTE
This is a new problem to this examiner.  He has a more audible aortic stenosis murmur today.  Will check echocardiogram for further evaluation.  He denies any chest pains, dyspnea on exertion, palpitation or lightheadedness symptoms.

## 2022-12-09 NOTE — ASSESSMENT & PLAN NOTE
Recently started metformin  mg daily for prediabetes with A1c of 6.2.  Check A1c level today.    Lab Results   Component Value Date    HGBA1C 6.2 09/13/2022    HGBA1C 6.3 (H) 06/01/2022    HGBA1C 6.20 (H) 09/17/2021

## 2022-12-09 NOTE — PROGRESS NOTES
I N T E R N A L  M E D I C I N E    J U N O H  K I M,  M D      ENCOUNTER DATE:  12/09/2022    Jeremi Shankar Sr. / 72 y.o. / male    MEDICARE ANNUAL WELLNESS VISIT       Chief Complaint: Medicare Wellness-subsequent (6/16/20)       Patient's general assessment of his health since a year ago:     - Compared to one year ago, he feels his physical health is:   [x] About the same  [] Better  [] Little worse  [] Significantly worse  []     - Compared to one year ago, he feels his mental health is   [x] About the same  [] Better  [] Little worse  [] Significantly worse  []     HPI for other active medical problems:     Blood pressure is running higher around 140's at home.  Compliant with medication including losartan hydrochlorothiazide 100/12.5 mg daily.  He denies chest pains, dyspnea on exertion, lightheadedness or heart palpitation.  Denies any edema of the lower extremities.  On review of previous heart tests he had an echo in 2014 which showed mild aortic stenosis.  Recently started on metformin  mg daily for prediabetes with A1c of 6.2.  Highest A1c level previously was 6.3.  He denies significant GI side effects from metformin.  He is on atorvastatin 40 mg and Zetia 10 mg for hyperlipidemia without significant problems.      HISTORY       Recent Hospitalizations:    Recent hospitalization?:     If YES, location, date, and diagnoses:     · Location:   · Date:   · Principle Discharge Dx:   · Secondary Dx:       Patient Care Team:    Patient Care Team:  Jhon Alvarez MD as PCP - General (Internal Medicine)      Allergies:  Lisinopril    Medications:  Current Outpatient Medications on File Prior to Visit   Medication Sig Dispense Refill   • atorvastatin (LIPITOR) 40 MG tablet Take 1 tablet by mouth Every Night. 90 tablet 3   • ezetimibe (ZETIA) 10 MG tablet Take 1 tablet by mouth Daily. 90 tablet 3   • losartan-hydrochlorothiazide (HYZAAR) 100-12.5 MG per tablet TAKE 1 TABLET DAILY 90 tablet 3   •  metFORMIN ER (GLUCOPHAGE-XR) 500 MG 24 hr tablet TAKE 1 TABLET BY MOUTH WITH DINNER 90 tablet 1   • [DISCONTINUED] azithromycin (ZITHROMAX) 250 MG tablet 2 on day #1 then 1 QD on days 2-5 6 tablet 0   • [DISCONTINUED] cyclobenzaprine (FLEXERIL) 10 MG tablet Take 1 tablet by mouth At Night As Needed for Muscle Spasms for up to 30 doses. 30 tablet 0     No current facility-administered medications on file prior to visit.        PFSH:     The following portions of the patient's history were reviewed and updated as appropriate: Allergies / Current Medications / Past Medical History / Surgical History / Social History / Family History    Problem List:  Patient Active Problem List   Diagnosis   • History of colon polyps   • Primary hypertension   • Prediabetes   • Hyperlipidemia   • Peyronie's disease   • Onychomycosis   • Nonrheumatic aortic valve stenosis       Past Medical History:  Past Medical History:   Diagnosis Date   • Arthritis 2019    arthritis is in my hands   • Benign essential hypertension    • Bilateral carpal tunnel syndrome 06/16/2020   • Cataract 2021    Eye doctor saw them in exam   • Colon polyp 2014 or 2015   • Diverticulosis of large intestine 03/06/2016    Description: March 2015????????colonoscopy????????Dr. Francis????????diverticulosis/colon polyps (2).   • Headache 1965   • Hyperlipidemia    • Type 2 diabetes mellitus (HCC)    • Visual impairment     glasses for reading       Past Surgical History:  Past Surgical History:   Procedure Laterality Date   • CARPAL TUNNEL RELEASE Left 05/2022   • COLONOSCOPY  2007    Dr Francis --WNL.  March 2015â€“Dr. Hernandez€“colon polyps (2); diverticulosis.   • COLONOSCOPY  2014?    polyps--DR Francis   • HERNIA REPAIR      (R) REPAIR TWICE       Social History:  Social History     Socioeconomic History   • Marital status:      Spouse name: Mia   • Number of children: 2   Tobacco Use   • Smoking status: Never     Passive exposure: Never   • Smokeless tobacco:  "Never   • Tobacco comments:     Never Smoked   Vaping Use   • Vaping Use: Never used   Substance and Sexual Activity   • Alcohol use: No   • Drug use: Never   • Sexual activity: Yes     Partners: Female       Family History:  Family History   Problem Relation Age of Onset   • Hypertension Mother         passed at 87   • Heart attack Father 86   • Heart disease Father    • No Known Problems Sister    • Asthma Brother          of covid complications   • Coronary artery disease Brother    • No Known Problems Brother    • Diabetes Maternal Grandfather    • Dementia Neg Hx    • Colon cancer Neg Hx    • Prostate cancer Neg Hx          PATIENT ASSESSMENT     Vitals:  Vitals:    22 0915   BP: 140/70   Pulse: 64   Temp: 97.5 °F (36.4 °C)   SpO2: 98%   Weight: 82.1 kg (181 lb)   Height: 177.8 cm (70\")       BP Readings from Last 3 Encounters:   22 140/70   22 151/71   22 142/72     Wt Readings from Last 3 Encounters:   22 82.1 kg (181 lb)   22 80.3 kg (177 lb)   22 82.1 kg (181 lb)      Body mass index is 25.97 kg/m².    Blood pressure readings recorded on patient flowsheet:  No flowsheet data found.         Review of Systems:    Review of Systems  Constitutional neg except per HPI   Resp dry cough x 3 weeks without shortness of breath or wheezing; denies recent upper respiratory tract infection   CV neg   GI negative   slower urinary stream, no hematuria; has Peyronie's disease but the penis  Neuro subtle cognitive changes without progression  No significant change in mood or cognition       Physical Exam:    Physical Exam  General: No acute distress  Psych: Normal thought and judgment   Cardiovascular Rate: normal. Rhythm: regular. Heart sounds: normal.   Pulm/Chest: Effort normal, breath sounds normal.  No lower extremity edema     Reviewed Data:    Labs:   Lab Results   Component Value Date     2022    K 4.2 2022    CALCIUM 9.9 2022    AST 23 " 06/01/2022    ALT 19 06/01/2022    BUN 16 06/01/2022    CREATININE 0.98 06/01/2022    CREATININE 1.02 06/18/2021    CREATININE 1.07 12/18/2020    EGFRIFNONA 72 06/18/2021    EGFRIFAFRI 87 06/18/2021       Lab Results   Component Value Date    HGBA1C 6.2 09/13/2022    HGBA1C 6.3 (H) 06/01/2022    HGBA1C 6.20 (H) 09/17/2021    MICROALBUR 3.1 08/29/2019       Lab Results   Component Value Date    LDL 84 11/22/2021     (H) 09/17/2021     (H) 06/18/2021    HDL 48 11/22/2021    TRIG 136 11/22/2021    CHOLHDLRATIO 3.3 11/22/2021       Lab Results   Component Value Date    TSH 1.220 06/18/2021    FREET4 1.04 06/18/2021          Lab Results   Component Value Date    WBC 5.73 06/16/2020    HGB 16.0 06/16/2020    HGB 13.5 (L) 01/13/2014     06/16/2020               Lab Results   Component Value Date    CREATININE 0.98 06/01/2022    CREATININE 1.02 06/18/2021    CREATININE 1.07 12/18/2020    BUN 16 06/01/2022    EGFRIFNONA 72 06/18/2021    EGFRIFAFRI 87 06/18/2021    MICROALBUR 3.1 08/29/2019           Lab Results   Component Value Date    PSA 1.3 06/01/2022    PSA 1.200 06/16/2020    PSA 1.030 09/20/2017       Imaging:          Medical Tests:      Echo 2014 mild AS     Screening for Glaucoma:  Previous screening for glaucoma?:   [x] YES  [] NO  []     Hearing Loss Screen:  Finger Rub Hearing Test (right ear):   [x] PASSED  [] FAILED  [] BORDERLINE  [] HAS HEARING AID  [] USING HEARING AID  [] NOT USING HEARING AID  []     Finger Rub Hearing Test (left ear):   [x] PASSED  [] FAILED  [] BORDERLINE  [] HAS HEARING AID  [] USING HEARING AID  [] NOT USING HEARING AID  []     Urinary Incontinence Screen:  Episodes of urinary incontinence? :  [] YES  [x] NO  [] N/A  [] WILL NEED FOLLOWUP  []       Depression Screen:  PHQ-2/PHQ-9 Depression Screening 12/9/2022   Retired PHQ-9 Total Score -   Retired Total Score -   Little Interest or Pleasure in Doing Things 0-->not at all   Feeling Down, Depressed or Hopeless  0-->not at all   PHQ-9: Brief Depression Severity Measure Score 0        PHQ-2:   [x] 0 -      NOT DEPRESSED  [] 1 -      NOT DEPRESSED  [] 2 -      NOT DEPRESSED  [] 3-6 -  DEPRESSED (PROCEED TO PHQ-9)  [] N/A - HAS DEPRESSION AND IS ON TREATMENT  [] N/A - HAS DEPRESSION AND IS NOT ON TREATMENT    PHQ-9:   [x] 0         NEGATIVE SCREENING FOR DEPRESSION  [] 1-4      MINIMAL DEPRESSION  [] 5-9      MILD DEPRESSIONNOT DEPRESSED  [] 10-14  MODERATE DEPRESSION  [] 15-19  MODERATELY SEVERE DEPRESSION  [] 20-27  SEVERE DEPRESSION    FUNCTIONAL, FALL RISK, & COGNITIVE SCREENING (Components below):    DATA:    Functional & Cognitive Status 12/9/2022   Do you have difficulty preparing food and eating? No   Do you have difficulty bathing yourself, getting dressed or grooming yourself? No   Do you have difficulty using the toilet? No   Do you have difficulty moving around from place to place? No   Do you have trouble with steps or getting out of a bed or a chair? No   Current Diet Well Balanced Diet   Dental Exam Up to date   Eye Exam Not up to date   Exercise (times per week) 5 times per week   Current Exercises Include Yard Work   Do you need help using the phone?  No   Are you deaf or do you have serious difficulty hearing?  No   Do you need help with transportation? No   Do you need help shopping? No   Do you need help preparing meals?  No   Do you need help with housework?  No   Do you need help with laundry? No   Do you need help taking your medications? No   Do you need help managing money? No   Do you ever drive or ride in a car without wearing a seat belt? No   Who do you live with? Spouse   Do you have difficulty concentrating, remembering or making decisions? No         A) Assessment of Functional Ability:  (Assessment of ability to perform ADL's (showering/bathing, using toilet, dressing, feeding self, moving self around) and IADL's (use telephone, shop, prepare food, housekeep, do laundry, transport  independently, take medications independently, and handle finances)    Degree of functional impairment: (based on assessment noted above)  [x] NONE  [] MILD  [] MODERATE  [] SEVERE  [] VERY SEVERE  []     B) Assessment of Fall Risk:  [x] LOW  [] MODERATE  [] HIGH  [] VERY HIGH     Need for further evaluation of gait, strength, and balance? :  [] YES  [x] NO    Timed Up and Go (TUG):   (>= 12 seconds indicates high risk for falling)    Observable abnormalities included:  [x] NORMAL GAIT   [] SLOW CAUTIOUS PACE  [] IMPAIRED BALANCE  [] SHORT STRIDES  [] MINIMAL ARM SWING  [] UNSTEADY (MILD)  [] UNSTEADY (MODERATE)  [] UNSTEADY (SEVERE)  [] SHUFFLING GAIT  [] USES ASSISTIVE DEVICE (CANE) PROPERLY  [] USES ASSISTIVE DEVICE (WALKER) PROPERLY  [] USES ASSISTIVE DEVICE (CANE) PROPERLY  [] DOES NOT USE ASSISTIVE DEVICE PROPERLY  [] IN WHEELCHAIR   [] NOT ABLE TO BE TESTED DUE TO SAFETY CONCERNS  []     C. Assessment of Cognitive Function:    Mini-Cog Test:     1) Registration (3 objects):     [x] YES  [] NO   [] N/A HAS DOCUMENTED DEMENTIA     2) Number of objects recalled:   [x] 3  [] 2  [] 1  [] 0     3) Clock Draw: Passed? :   [] YES  [] NO   [x] N/A  [] N/A HAS DOCUMENTED DEMENTIA     Further evaluation required? :   [] YES  [] NO   [x] CLOSE OBSERVATION NEEDED   [] SCHEDULE APPOINTMENT TO EVALUATE FURTHER   [] CONTINUE REGULAR FOLLOWUP AND MANAGEMENT   []    COUNSELING       A. Identification of Health Risk Factors:    Risk factors include: cardiovascular risk factors      B. Age-Appropriate Screening Schedule:  (Refer to the list below for future screening recommendations based on patient's age, sex and/or medical conditions. Orders for these recommended tests are listed in the plan section. The patient has been provided with a written plan)    Health Maintenance Topics  Health Maintenance   Topic Date Due   • TDAP/TD VACCINES (1 - Tdap) Never done   • INFLUENZA VACCINE  08/01/2022   • LIPID PANEL  11/22/2022   •  HEMOGLOBIN A1C  03/13/2023   • PROSTATE CANCER SCREENING  06/01/2023   • ZOSTER VACCINE  Completed   • DIABETIC FOOT EXAM  Discontinued   • DIABETIC EYE EXAM  Discontinued   • URINE MICROALBUMIN  Discontinued       Health Maintenance Topics Due or Over-Due  Health Maintenance Due   Topic Date Due   • TDAP/TD VACCINES (1 - Tdap) Never done   • INFLUENZA VACCINE  08/01/2022   • LIPID PANEL  11/22/2022   • Pneumococcal Vaccine 65+ (2 - PCV) 11/29/2022         C. Advanced Care Planning:    Advance Care Planning   ACP discussion was held with the patient during this visit. Patient does not have an advance directive, information provided.       D. Patient Self-Management and Personalized Health Advice:    He has been provided with personalized counseling/information (including brochures/handouts) about:     -- optimizing diet/nutrition plans, improving exercise / conditioning, weight management, reducing risk for cardiovascular disease (heart, stroke, vascular), possible evidence of cognitive problems and need for ongoing surveillance for progressive changes, designing advance directives and designating POA      He has been recommended for the following preventative services which has been performed today, will be ordered today or ordered/performed on upcoming follow-up visit:     -- NUTRITION counseling provided, EXERCISE counseling provided, CARDIOVASCULAR disease risk reduction counseling performed, FALL RISK assessment / plan of care completed, URINARY incontinence assessment done, VASCULAR screening recommended (including AAA screening), ISCHEMIC HEART DISEASE screening (CCT or stress test), screening for prostate cancer (discussed and PSA will be performed annually), vaccination for INFLUENZA administered/ (or recommended), vaccination for PNEUMOVAX/PCV administered (or recommended), vaccination for Tdap / Td administered (or recommended), vaccination for HEPATITIS A administered (or recommended), COVID-19  vaccination (and/or booster) recommendations provided, DIABETES screening performed (current/reviewed labs/lab ordered)      E. Miscellaneous Items:    -Aspirin use counseling: Does not need ASA (and currently is not on it)    -Discussed BMI with him. The BMI is in the acceptable range    -Reviewed use of high risk medication in the elderly: YES    -Reviewed for potential of harmful drug interactions in the elderly: YES        WRAP UP       Assessment & Plan:    1) MEDICARE ANNUAL WELLNESS VISIT    2) OTHER MEDICAL CONDITIONS ADDRESSED TODAY:            Problem List Items Addressed This Visit        High    Primary hypertension (Chronic)    Current Assessment & Plan     His blood pressure appears to be running higher here as well as at home.    Send blood pressure reading in 1 month.   Continue losartan hydrochlorothiazide 100-12.5 mg daily but will likely need to add a second blood pressure medication to his regimen pending blood pressure results from home.  Maintain low sodium diet of less than 2.5 grams daily.     BP Readings from Last 3 Encounters:   12/09/22 140/70   11/23/22 151/71   09/13/22 142/72             Relevant Medications    losartan-hydrochlorothiazide (HYZAAR) 100-12.5 MG per tablet    Prediabetes (Chronic)    Current Assessment & Plan     Recently started metformin  mg daily for prediabetes with A1c of 6.2.  Check A1c level today.    Lab Results   Component Value Date    HGBA1C 6.2 09/13/2022    HGBA1C 6.3 (H) 06/01/2022    HGBA1C 6.20 (H) 09/17/2021             Relevant Medications    metFORMIN ER (GLUCOPHAGE-XR) 500 MG 24 hr tablet       Medium    Nonrheumatic aortic valve stenosis (Chronic)    Current Assessment & Plan     This is a new problem to this examiner.  He has a more audible aortic stenosis murmur today.  Will check echocardiogram for further evaluation.  He denies any chest pains, dyspnea on exertion, palpitation or lightheadedness symptoms.         Relevant Orders    Adult  Transthoracic Echo Complete W/ Cont if Necessary Per Protocol   Other Visit Diagnoses     Medicare annual wellness visit, subsequent    -  Primary                    Orders Placed This Encounter   Procedures   • Fluzone High-Dose 65+yrs ([id number])   • Pneumococcal Conjugate Vaccine 20-Valent (PCV20)   • Adult Transthoracic Echo Complete W/ Cont if Necessary Per Protocol       Discussion / Summary:        Medications as of TODAY:              Current Outpatient Medications   Medication Sig Dispense Refill   • atorvastatin (LIPITOR) 40 MG tablet Take 1 tablet by mouth Every Night. 90 tablet 3   • ezetimibe (ZETIA) 10 MG tablet Take 1 tablet by mouth Daily. 90 tablet 3   • losartan-hydrochlorothiazide (HYZAAR) 100-12.5 MG per tablet TAKE 1 TABLET DAILY 90 tablet 3   • metFORMIN ER (GLUCOPHAGE-XR) 500 MG 24 hr tablet TAKE 1 TABLET BY MOUTH WITH DINNER 90 tablet 1     No current facility-administered medications for this visit.         FOLLOW-UP:            Return in about 5 months (around 5/9/2023) for Reassess chronic medical problems.               No future appointments.        After Visit Summary (AVS) including the Personalized Prevention  Plan Services (PPPS) was either printed and given to the patient at check-out today and/or sent to [facility] for review.         *Examiner was wearing KN95 mask and eye protection during the entire duration of the visit. Patient was masked the entire time. Minimum social distance of 6 ft maintained entire visit except if physical contact was necessary as documented.       Template created by Yu Alvarez MD

## 2022-12-09 NOTE — ASSESSMENT & PLAN NOTE
His blood pressure appears to be running higher here as well as at home.    Send blood pressure reading in 1 month.   Continue losartan hydrochlorothiazide 100-12.5 mg daily but will likely need to add a second blood pressure medication to his regimen pending blood pressure results from home.  Maintain low sodium diet of less than 2.5 grams daily.     BP Readings from Last 3 Encounters:   12/09/22 140/70   11/23/22 151/71   09/13/22 142/72

## 2023-01-04 ENCOUNTER — HOSPITAL ENCOUNTER (OUTPATIENT)
Dept: CARDIOLOGY | Facility: HOSPITAL | Age: 73
Discharge: HOME | End: 2023-01-04
Admitting: INTERNAL MEDICINE
Payer: MEDICARE

## 2023-01-04 VITALS
HEIGHT: 70 IN | BODY MASS INDEX: 25.91 KG/M2 | HEART RATE: 60 BPM | SYSTOLIC BLOOD PRESSURE: 140 MMHG | WEIGHT: 181 LBS | DIASTOLIC BLOOD PRESSURE: 70 MMHG

## 2023-01-04 DIAGNOSIS — Q23.1 AORTIC STENOSIS DUE TO BICUSPID AORTIC VALVE: Primary | ICD-10-CM

## 2023-01-04 DIAGNOSIS — Q23.0 AORTIC STENOSIS DUE TO BICUSPID AORTIC VALVE: Primary | ICD-10-CM

## 2023-01-04 LAB
AORTIC ARCH: 2.6 CM
AORTIC DIMENSIONLESS INDEX: 0.2 (DI)
ASCENDING AORTA: 3.7 CM
BH CV ECHO MEAS - ACS: 1.02 CM
BH CV ECHO MEAS - AO MAX PG: 57.8 MMHG
BH CV ECHO MEAS - AO MEAN PG: 36 MMHG
BH CV ECHO MEAS - AO ROOT DIAM: 4.7 CM
BH CV ECHO MEAS - AO V2 MAX: 380.1 CM/SEC
BH CV ECHO MEAS - AO V2 VTI: 85.1 CM
BH CV ECHO MEAS - AVA(I,D): 0.74 CM2
BH CV ECHO MEAS - EDV(CUBED): 118.9 ML
BH CV ECHO MEAS - EDV(MOD-SP2): 73 ML
BH CV ECHO MEAS - EDV(MOD-SP4): 70 ML
BH CV ECHO MEAS - EF(MOD-BP): 59.6 %
BH CV ECHO MEAS - EF(MOD-SP2): 64.4 %
BH CV ECHO MEAS - EF(MOD-SP4): 58.6 %
BH CV ECHO MEAS - ESV(CUBED): 21.1 ML
BH CV ECHO MEAS - ESV(MOD-SP2): 26 ML
BH CV ECHO MEAS - ESV(MOD-SP4): 29 ML
BH CV ECHO MEAS - FS: 43.8 %
BH CV ECHO MEAS - IVS/LVPW: 0.94 CM
BH CV ECHO MEAS - IVSD: 0.98 CM
BH CV ECHO MEAS - LAT PEAK E' VEL: 8.1 CM/SEC
BH CV ECHO MEAS - LV DIASTOLIC VOL/BSA (35-75): 93.3 CM2
BH CV ECHO MEAS - LV MASS(C)D: 179.1 GRAMS
BH CV ECHO MEAS - LV MAX PG: 2.38 MMHG
BH CV ECHO MEAS - LV MEAN PG: 1.26 MMHG
BH CV ECHO MEAS - LV SYSTOLIC VOL/BSA (12-30): 38.7 CM2
BH CV ECHO MEAS - LV V1 MAX: 77.1 CM/SEC
BH CV ECHO MEAS - LV V1 VTI: 19.2 CM
BH CV ECHO MEAS - LVIDD: 4.9 CM
BH CV ECHO MEAS - LVIDS: 2.8 CM
BH CV ECHO MEAS - LVOT AREA: 3.3 CM2
BH CV ECHO MEAS - LVOT DIAM: 2.04 CM
BH CV ECHO MEAS - LVPWD: 1.04 CM
BH CV ECHO MEAS - MED PEAK E' VEL: 5.4 CM/SEC
BH CV ECHO MEAS - MV A DUR: 0.14 SEC
BH CV ECHO MEAS - MV A MAX VEL: 71.6 CM/SEC
BH CV ECHO MEAS - MV DEC SLOPE: 255.5 CM/SEC2
BH CV ECHO MEAS - MV DEC TIME: 0.34 MSEC
BH CV ECHO MEAS - MV E MAX VEL: 51 CM/SEC
BH CV ECHO MEAS - MV E/A: 0.71
BH CV ECHO MEAS - MV MAX PG: 2.7 MMHG
BH CV ECHO MEAS - MV MEAN PG: 1.24 MMHG
BH CV ECHO MEAS - MV P1/2T: 75.6 MSEC
BH CV ECHO MEAS - MV V2 VTI: 19.4 CM
BH CV ECHO MEAS - MVA(P1/2T): 2.9 CM2
BH CV ECHO MEAS - MVA(VTI): 3.2 CM2
BH CV ECHO MEAS - PA ACC TIME: 0.16 SEC
BH CV ECHO MEAS - PA PR(ACCEL): 8.9 MMHG
BH CV ECHO MEAS - PA V2 MAX: 119.4 CM/SEC
BH CV ECHO MEAS - PULM A REVS DUR: 0.12 SEC
BH CV ECHO MEAS - PULM A REVS VEL: 27.9 CM/SEC
BH CV ECHO MEAS - PULM DIAS VEL: 23.6 CM/SEC
BH CV ECHO MEAS - PULM S/D: 1.25
BH CV ECHO MEAS - PULM SYS VEL: 29.6 CM/SEC
BH CV ECHO MEAS - QP/QS: 0.67
BH CV ECHO MEAS - RV MAX PG: 1.79 MMHG
BH CV ECHO MEAS - RV V1 MAX: 66.8 CM/SEC
BH CV ECHO MEAS - RV V1 VTI: 13.6 CM
BH CV ECHO MEAS - RVOT DIAM: 1.98 CM
BH CV ECHO MEAS - SI(MOD-SP2): 62.7 ML/M2
BH CV ECHO MEAS - SI(MOD-SP4): 54.7 ML/M2
BH CV ECHO MEAS - SV(LVOT): 62.8 ML
BH CV ECHO MEAS - SV(MOD-SP2): 47 ML
BH CV ECHO MEAS - SV(MOD-SP4): 41 ML
BH CV ECHO MEAS - SV(RVOT): 42.1 ML
BH CV ECHO MEAS - TAPSE (>1.6): 2.12 CM
BH CV ECHO MEASUREMENTS AVERAGE E/E' RATIO: 7.56
BH CV XLRA - RV BASE: 2.17 CM
BH CV XLRA - RV LENGTH: 6.3 CM
BH CV XLRA - RV MID: 2.2 CM
BH CV XLRA - TDI S': 5.1 CM/SEC
LEFT ATRIUM VOLUME INDEX: 46.7 ML/M2
MAXIMAL PREDICTED HEART RATE: 147 BPM
SINUS: 3 CM
STJ: 3 CM
STRESS TARGET HR: 125 BPM

## 2023-01-04 PROCEDURE — 93306 TTE W/DOPPLER COMPLETE: CPT

## 2023-01-04 PROCEDURE — 93306 TTE W/DOPPLER COMPLETE: CPT | Performed by: INTERNAL MEDICINE

## 2023-01-10 ENCOUNTER — OFFICE VISIT (OUTPATIENT)
Dept: CARDIOLOGY | Facility: CLINIC | Age: 73
End: 2023-01-10
Payer: MEDICARE

## 2023-01-10 VITALS
DIASTOLIC BLOOD PRESSURE: 74 MMHG | SYSTOLIC BLOOD PRESSURE: 128 MMHG | HEART RATE: 61 BPM | WEIGHT: 181 LBS | HEIGHT: 70 IN | BODY MASS INDEX: 25.91 KG/M2

## 2023-01-10 DIAGNOSIS — E78.2 MIXED HYPERLIPIDEMIA: Chronic | ICD-10-CM

## 2023-01-10 DIAGNOSIS — I35.0 AORTIC STENOSIS, MODERATE: Primary | ICD-10-CM

## 2023-01-10 DIAGNOSIS — I10 PRIMARY HYPERTENSION: Chronic | ICD-10-CM

## 2023-01-10 DIAGNOSIS — I35.0 NONRHEUMATIC AORTIC VALVE STENOSIS: Chronic | ICD-10-CM

## 2023-01-10 PROCEDURE — 99204 OFFICE O/P NEW MOD 45 MIN: CPT | Performed by: STUDENT IN AN ORGANIZED HEALTH CARE EDUCATION/TRAINING PROGRAM

## 2023-01-10 PROCEDURE — 93000 ELECTROCARDIOGRAM COMPLETE: CPT | Performed by: STUDENT IN AN ORGANIZED HEALTH CARE EDUCATION/TRAINING PROGRAM

## 2023-01-10 NOTE — PROGRESS NOTES
Subjective:     Encounter Date:01/10/23      Patient ID: Jeremi Shankar SrMilagros is a 72 y.o. male.    Chief Complaint:   Chief Complaint   Patient presents with   • Aortic Stenosis      History of Present Illness    Mr. Shankar is a pleasant 69-year-old gentleman past medical history hypertension, hyperlipidemia, who presents for further evaluation of a cardiac murmur.    He states that at baseline he is very active, he has 13 acres and is able to do activities along his land without stopping due to shortness of breath.  Is able to chop and carry wood without issues.  He does have intermittent episodes of a left sided throbbing pain that occurs sometimes the day after rigorous activity.  He attributes it may be musculoskeletal in origin after carrying wood.    Denies any exertional symptoms like exertional angina or exertional dyspnea.  He states that he is pretty active overall and denies functional complaints.  He presented to his PCP, after he recently changed primary care doctors, and he was noted to have a cardiac murmur.  He was referred for an echocardiogram which revealed a bicuspid aortic valve with moderate to severe AS.  He presents today for further evaluation.    He does continue to deny any cardiac complaints.  No angina or dyspnea.    The following portions of the patient's history were reviewed and updated as appropriate: allergies, current medications, past family history, past medical history, past social history, past surgical history and problem list.    Past Medical History:   Diagnosis Date   • Arthritis 2019    arthritis is in my hands   • Benign essential hypertension    • Bilateral carpal tunnel syndrome 06/16/2020   • Cataract 2021    Eye doctor saw them in exam   • Colon polyp 2014 or 2015   • Diverticulosis of large intestine 03/06/2016    Description: March 2015????????colonoscopy????????Dr. Francis????????diverticulosis/colon polyps (2).   • Headache 1965   • Hyperlipidemia    •  Type 2 diabetes mellitus (HCC)    • Visual impairment     glasses for reading       Past Surgical History:   Procedure Laterality Date   • CARPAL TUNNEL RELEASE Left 05/2022   • COLONOSCOPY  2007    Dr Francis --WNL.  March 2015â€“Dr. Hernandez€“colon polyps (2); diverticulosis.   • COLONOSCOPY  2014?    polyps--DR Francis   • HERNIA REPAIR      (R) REPAIR TWICE           ECG 12 Lead    Date/Time: 1/10/2023 8:50 AM  Performed by: Nash Grey MD  Authorized by: Nash Grey MD   Comparison: compared with previous ECG from 8/4/2020  Similar to previous ECG  Rhythm: sinus rhythm  Rate: normal  Conduction: 1st degree AV block  ST Segments: ST segments normal  T Waves: T waves normal  QRS axis: normal  Other: no other findings    Clinical impression: abnormal EKG               Objective:     Vitals:    01/10/23 0829 01/10/23 0837   BP: 132/74 128/74   BP Location: Right arm Left arm   Pulse: 61    Weight: 82.1 kg (181 lb)    Height: 177.8 cm (70\")          Constitutional:       Appearance: Healthy appearance. Not in distress.   Neck:      Vascular: JVD normal.   Pulmonary:      Effort: Pulmonary effort is normal.      Breath sounds: Normal breath sounds.   Cardiovascular:      PMI at left midclavicular line. Normal rate. Regular rhythm. Normal S2.      Murmurs: There is a grade 3/6 harsh midsystolic murmur at the URSB, radiating to the neck.      Comments: No significant delayed upstroke noted.  Pulses:     Intact distal pulses.   Skin:     General: Skin is warm and dry.   Neurological:      General: No focal deficit present.      Mental Status: Alert, oriented to person, place, and time and oriented to person, place and time.   Psychiatric:         Mood and Affect: Mood and affect normal.         Lab Review:       BUN   Date Value Ref Range Status   06/01/2022 16 8 - 27 mg/dL Final     Creatinine   Date Value Ref Range Status   06/01/2022 0.98 0.76 - 1.27 mg/dL Final     Potassium   Date Value Ref Range Status    06/01/2022 4.2 3.5 - 5.2 mmol/L Final   08/04/2020 4.4 3.5 - 5.2 mmol/L Final     ALT (SGPT)   Date Value Ref Range Status   06/01/2022 19 0 - 44 IU/L Final     AST (SGOT)   Date Value Ref Range Status   06/01/2022 23 0 - 40 IU/L Final     I reviewed the echocardiogram from January 4, 2023:  Normal LV systolic function, EF 60%  Bicuspid aortic valve with aortic valve area 0.7 cm².  Peak velocity 383 cm/s, mean gradient 36, dimensionless index 0.2  Moderate to severe arctic stenosis.  Moderate dilation aortic root with measuring 4.7 cm, normal ascending aorta 3.7 cm        Assessment:          Diagnosis Plan   1. Aortic stenosis, moderate  ECG 12 Lead    Adult Transthoracic Echo Complete w/ Color, Spectral and Contrast if Necessary Per Protocol      2. Primary hypertension        3. Mixed hyperlipidemia        4. Nonrheumatic aortic valve stenosis               Plan:           1. Moderate to severe bicuspid aortic stenosis  - Gradients and parameters noted above.  - He is asymptomatic  - We will further evaluate with 6-month clinical follow-up and repeat TTE at that time.  Should it reach the severe category, will obtain GXT to ensure he is indeed asymptomatic.  - Educated patient on natural progression and clinical history to expect with bicuspid aortic stenosis including operative repair.  - He does have 2 adult children, I recommended that they get screened with an echocardiogram.  Patient will reach out to them.  - Does not require intervention at this time, will monitor closely and in the clinical setting.    2. Hyperlipidemia  - Well-controlled on Lipitor and Zetia, continue excellent management per PCP.    3. Hypertension  - Continue management per PCP.    4.  Borderline diabetes mellitus  - Continue metformin per PCP.    Thank you very much for allowing us to participate in the care of this pleasant patient.  Please don't hesitate to call if I can be of assistance in any way.      RTC 6 months for symptom  check-in.  TTE at that time.  Patient educated on when to seek care for signs of worsening aortic stenosis, which case he is to reach out to us to make an appointment.  These would include signs of exertional angina, or worsening dyspnea.  Patient educated and expressed understanding.      Current Outpatient Medications:   •  atorvastatin (LIPITOR) 40 MG tablet, Take 1 tablet by mouth Every Night., Disp: 90 tablet, Rfl: 3  •  ezetimibe (ZETIA) 10 MG tablet, Take 1 tablet by mouth Daily., Disp: 90 tablet, Rfl: 3  •  losartan-hydrochlorothiazide (HYZAAR) 100-12.5 MG per tablet, TAKE 1 TABLET DAILY, Disp: 90 tablet, Rfl: 3  •  metFORMIN ER (GLUCOPHAGE-XR) 500 MG 24 hr tablet, TAKE 1 TABLET BY MOUTH WITH DINNER, Disp: 90 tablet, Rfl: 1         Return in about 6 months (around 7/17/2023).      **Maribell Disclaimer:   Much of this encounter note is an electronic transcription/translation of spoken language to printed text. The electronic translation of spoken language may permit erroneous, or at times, nonsensical words or phrases to be inadvertently transcribed. Although I have reviewed the note for such errors, some may still exist.

## 2023-01-10 NOTE — PATIENT INSTRUCTIONS
You have a bicuspid aortic heart valve. I would recommend you have your children screened with a heart ultrasound, echo. This can be ordered by a primary doctor, or our clinic can do it as well, just reach out to our staff and we can facilitate.

## 2023-03-03 DIAGNOSIS — R73.03 PREDIABETES: ICD-10-CM

## 2023-03-03 RX ORDER — METFORMIN HYDROCHLORIDE 500 MG/1
TABLET, EXTENDED RELEASE ORAL
Qty: 90 TABLET | Refills: 3 | Status: SHIPPED | OUTPATIENT
Start: 2023-03-03

## 2023-05-09 ENCOUNTER — OFFICE VISIT (OUTPATIENT)
Dept: INTERNAL MEDICINE | Age: 73
End: 2023-05-09
Payer: MEDICARE

## 2023-05-09 VITALS
SYSTOLIC BLOOD PRESSURE: 140 MMHG | BODY MASS INDEX: 25.77 KG/M2 | HEART RATE: 64 BPM | TEMPERATURE: 97.3 F | DIASTOLIC BLOOD PRESSURE: 72 MMHG | WEIGHT: 180 LBS | OXYGEN SATURATION: 98 % | HEIGHT: 70 IN

## 2023-05-09 DIAGNOSIS — R73.03 PREDIABETES: Chronic | ICD-10-CM

## 2023-05-09 DIAGNOSIS — E78.2 MIXED HYPERLIPIDEMIA: Chronic | ICD-10-CM

## 2023-05-09 DIAGNOSIS — I10 PRIMARY HYPERTENSION: Primary | Chronic | ICD-10-CM

## 2023-05-09 PROBLEM — Q23.1 AORTIC STENOSIS DUE TO BICUSPID AORTIC VALVE: Chronic | Status: ACTIVE | Noted: 2022-12-09

## 2023-05-09 PROBLEM — Q23.81 AORTIC STENOSIS DUE TO BICUSPID AORTIC VALVE: Chronic | Status: ACTIVE | Noted: 2022-12-09

## 2023-05-09 PROBLEM — B35.1 ONYCHOMYCOSIS: Status: RESOLVED | Noted: 2021-11-29 | Resolved: 2023-05-09

## 2023-05-09 PROBLEM — N48.6 PEYRONIE'S DISEASE: Chronic | Status: ACTIVE | Noted: 2020-06-16

## 2023-05-09 PROBLEM — Q23.1 AORTIC STENOSIS DUE TO BICUSPID AORTIC VALVE: Status: ACTIVE | Noted: 2022-12-09

## 2023-05-09 PROBLEM — Q23.0 AORTIC STENOSIS DUE TO BICUSPID AORTIC VALVE: Status: ACTIVE | Noted: 2022-12-09

## 2023-05-09 PROBLEM — Q23.81 AORTIC STENOSIS DUE TO BICUSPID AORTIC VALVE: Status: ACTIVE | Noted: 2022-12-09

## 2023-05-09 PROBLEM — Q23.0 AORTIC STENOSIS DUE TO BICUSPID AORTIC VALVE: Chronic | Status: ACTIVE | Noted: 2022-12-09

## 2023-05-09 PROCEDURE — 99214 OFFICE O/P EST MOD 30 MIN: CPT | Performed by: INTERNAL MEDICINE

## 2023-05-09 PROCEDURE — 3077F SYST BP >= 140 MM HG: CPT | Performed by: INTERNAL MEDICINE

## 2023-05-09 PROCEDURE — 1160F RVW MEDS BY RX/DR IN RCRD: CPT | Performed by: INTERNAL MEDICINE

## 2023-05-09 PROCEDURE — 1159F MED LIST DOCD IN RCRD: CPT | Performed by: INTERNAL MEDICINE

## 2023-05-09 PROCEDURE — 3078F DIAST BP <80 MM HG: CPT | Performed by: INTERNAL MEDICINE

## 2023-05-09 NOTE — PROGRESS NOTES
I N T E R N A L  M E D I C I N E    J U N O H  K I M,  M D      ENCOUNTER DATE:  05/09/2023    Jeremi Shankar Sr. / 72 y.o. / male    CHIEF COMPLAINT / REASON FOR OFFICE VISIT     Hypertension, Prediabetes, and Hyperlipidemia      ASSESSMENT & PLAN     Problem List Items Addressed This Visit        High    Primary hypertension - Primary (Chronic)    Overview     Continue losartan hydrochlorothiazide 100/12.5 mg daily         Current Assessment & Plan     Marginal blood pressure control.  Advised to monitor blood pressure at home.  Continue losartan hydrochlorothiazide 100-12.5 mg daily for now.    BP Readings from Last 3 Encounters:   05/09/23 140/72   02/26/23 154/83   01/10/23 128/74             Relevant Medications    losartan-hydrochlorothiazide (HYZAAR) 100-12.5 MG per tablet    Other Relevant Orders    Comprehensive Metabolic Panel    Urinalysis With Culture If Indicated - Urine, Clean Catch       Medium    Prediabetes (Chronic)    Overview     Continue metformin  mg daily         Relevant Medications    metFORMIN ER (GLUCOPHAGE-XR) 500 MG 24 hr tablet    Other Relevant Orders    Hemoglobin A1c       Low    Hyperlipidemia (Chronic)    Overview     Continue atorvastatin 40 mg and Zetia 10 mg daily         Relevant Medications    ezetimibe (ZETIA) 10 MG tablet    atorvastatin (LIPITOR) 40 MG tablet    Other Relevant Orders    Lipid Panel With / Chol / HDL Ratio     Orders Placed This Encounter   Procedures   • Comprehensive Metabolic Panel   • Lipid Panel With / Chol / HDL Ratio   • Hemoglobin A1c   • Urinalysis With Culture If Indicated - Urine, Clean Catch     No orders of the defined types were placed in this encounter.      SUMMARY/DISCUSSION  •       Next Appointment with me: Visit date not found    Return in about 7 months (around 12/11/2023) for COMBINED AWV AND MEDICAL F/U (30 MIN).      VITAL SIGNS     Vitals:    05/09/23 0829   BP: 140/72   Pulse: 64   Temp: 97.3 °F (36.3 °C)   SpO2: 98%  "  Weight: 81.6 kg (180 lb)   Height: 177.8 cm (70\")       BP Readings from Last 3 Encounters:   05/09/23 140/72   02/26/23 154/83   01/10/23 128/74     Wt Readings from Last 3 Encounters:   05/09/23 81.6 kg (180 lb)   02/26/23 81.6 kg (180 lb)   01/10/23 82.1 kg (181 lb)     Body mass index is 25.83 kg/m².    Blood pressure readings recorded on patient flowsheet:       View : No data to display.                  MEDICATIONS AT THE TIME OF OFFICE VISIT     Current Outpatient Medications on File Prior to Visit   Medication Sig   • atorvastatin (LIPITOR) 40 MG tablet Take 1 tablet by mouth Every Night.   • ezetimibe (ZETIA) 10 MG tablet Take 1 tablet by mouth Daily.   • losartan-hydrochlorothiazide (HYZAAR) 100-12.5 MG per tablet TAKE 1 TABLET DAILY   • metFORMIN ER (GLUCOPHAGE-XR) 500 MG 24 hr tablet TAKE 1 TABLET BY MOUTH WITH DINNER       HISTORY OF PRESENT ILLNESS     He has not been checking his blood pressure regularly at home but reports compliance with losartan hydrochlorothiazide 100/12.5 mg daily for hypertension.  He is on metformin  mg daily for prediabetes.  Denies significant problems or issues with atorvastatin 40 mg for hyperlipidemia.  He does complain of increased urinary difficulties with known history of enlarged prostate and Peyronie's disease.  He does have a urologist he can follow-up with.  He sees cardiology for aortic stenosis.  Denies chest pains dyspnea on exertion palpitations or syncope.  He is planned to undergo stress testing.      Patient Care Team:  Jhon Alvarez MD as PCP - General (Internal Medicine)  Nash Grey MD as Consulting Physician (Cardiology)    REVIEW OF SYSTEMS     Review of Systems       PHYSICAL EXAMINATION     Physical Exam  General: No acute distress  Psych: Normal thought and judgment   Cardiovascular Rate: normal. Rhythm: regular. Heart sounds: Systolic murmur right upper sternal border  Pulmonary/Chest: Effort normal and breath sounds normal.   No edema of " lower extremities      REVIEWED DATA     Labs:     Lab Results   Component Value Date     06/01/2022    K 4.2 06/01/2022    CALCIUM 9.9 06/01/2022    AST 23 06/01/2022    ALT 19 06/01/2022    BUN 16 06/01/2022    CREATININE 0.98 06/01/2022    CREATININE 1.02 06/18/2021    CREATININE 1.07 12/18/2020    EGFRIFNONA 72 06/18/2021    EGFRIFAFRI 87 06/18/2021       Lab Results   Component Value Date    HGBA1C 6.2 09/13/2022    HGBA1C 6.3 (H) 06/01/2022    HGBA1C 6.20 (H) 09/17/2021       Lab Results   Component Value Date    LDL 84 11/22/2021     (H) 09/17/2021     (H) 06/18/2021    HDL 48 11/22/2021    HDL 49 09/17/2021    TRIG 136 11/22/2021    TRIG 147 09/17/2021       Lab Results   Component Value Date    TSH 1.220 06/18/2021    TSH 1.600 06/16/2020    FREET4 1.04 06/18/2021    FREET4 1.10 06/16/2020       Lab Results   Component Value Date    WBC 5.73 06/16/2020    HGB 16.0 06/16/2020     06/16/2020       No results found for: MALBCRERATIO     Lab Results   Component Value Date    PROTEIN Negative 06/18/2021    GLUCOSEU Negative 06/18/2021    BLOODU Negative 06/18/2021    NITRITEU Negative 06/18/2021    LEUKOCYTESUR Negative 06/18/2021          Imaging:           Medical Tests:     Adult Transthoracic Echo Complete W/ Cont if Necessary Per Protocol (01/04/2023 08:55)  •  Left ventricular systolic function is normal.  •  Left ventricular diastolic function is consistent with (grade I) impaired relaxation.  •  Moderate to severe aortic valve stenosis is present.  A bicuspid aortic valve is noted.  Aortic valve area is 0.7 cm2.  •  Peak velocity of the flow distal to the aortic valve is 380.1 cm/s. Aortic valve maximum pressure gradient is 58 mmHg. Aortic valve mean pressure gradient is 36 mmHg. Aortic valve dimensionless index is 0.2 .  •  Moderate dilation of the aortic root is present. The aortic root measures 4.7 cm. Mild dilation of the ascending aorta is present 3.7 cm.        Summary of  old records / correspondence / consultant report:           Request outside records:             *Examiner was wearing [id number] mask during the entire duration of the visit. Patient was masked the entire time. Minimum social distance of 6 ft maintained entire visit except if physical contact was necessary as documented.       Template created by Yu Alvarez MD

## 2023-05-09 NOTE — ASSESSMENT & PLAN NOTE
Marginal blood pressure control.  Advised to monitor blood pressure at home.  Continue losartan hydrochlorothiazide 100-12.5 mg daily for now.    BP Readings from Last 3 Encounters:   05/09/23 140/72   02/26/23 154/83   01/10/23 128/74

## 2023-05-10 LAB
ALBUMIN SERPL-MCNC: 4.8 G/DL (ref 3.5–5.2)
ALBUMIN/GLOB SERPL: 2.2 G/DL
ALP SERPL-CCNC: 79 U/L (ref 39–117)
ALT SERPL-CCNC: 22 U/L (ref 1–41)
APPEARANCE UR: CLEAR
AST SERPL-CCNC: 22 U/L (ref 1–40)
BACTERIA #/AREA URNS HPF: NORMAL /HPF
BILIRUB SERPL-MCNC: 0.4 MG/DL (ref 0–1.2)
BILIRUB UR QL STRIP: NEGATIVE
BUN SERPL-MCNC: 17 MG/DL (ref 8–23)
BUN/CREAT SERPL: 17.9 (ref 7–25)
CALCIUM SERPL-MCNC: 10.2 MG/DL (ref 8.6–10.5)
CASTS URNS QL MICRO: NORMAL /LPF
CHLORIDE SERPL-SCNC: 101 MMOL/L (ref 98–107)
CHOLEST SERPL-MCNC: 149 MG/DL (ref 0–200)
CHOLEST/HDLC SERPL: 3.31 {RATIO}
CO2 SERPL-SCNC: 30.3 MMOL/L (ref 22–29)
COLOR UR: YELLOW
CREAT SERPL-MCNC: 0.95 MG/DL (ref 0.76–1.27)
EGFRCR SERPLBLD CKD-EPI 2021: 84.5 ML/MIN/1.73
EPI CELLS #/AREA URNS HPF: NORMAL /HPF (ref 0–10)
GLOBULIN SER CALC-MCNC: 2.2 GM/DL
GLUCOSE SERPL-MCNC: 105 MG/DL (ref 65–99)
GLUCOSE UR QL STRIP: NEGATIVE
HBA1C MFR BLD: 6.1 % (ref 4.8–5.6)
HDLC SERPL-MCNC: 45 MG/DL (ref 40–60)
HGB UR QL STRIP: NEGATIVE
KETONES UR QL STRIP: NEGATIVE
LDLC SERPL CALC-MCNC: 79 MG/DL (ref 0–100)
LEUKOCYTE ESTERASE UR QL STRIP: NEGATIVE
MICRO URNS: NORMAL
MICRO URNS: NORMAL
NITRITE UR QL STRIP: NEGATIVE
PH UR STRIP: 6 [PH] (ref 5–7.5)
POTASSIUM SERPL-SCNC: 4.2 MMOL/L (ref 3.5–5.2)
PROT SERPL-MCNC: 7 G/DL (ref 6–8.5)
PROT UR QL STRIP: NEGATIVE
RBC #/AREA URNS HPF: NORMAL /HPF (ref 0–2)
SODIUM SERPL-SCNC: 140 MMOL/L (ref 136–145)
SP GR UR STRIP: 1.01 (ref 1–1.03)
TRIGL SERPL-MCNC: 142 MG/DL (ref 0–150)
URINALYSIS REFLEX: NORMAL
UROBILINOGEN UR STRIP-MCNC: 0.2 MG/DL (ref 0.2–1)
VLDLC SERPL CALC-MCNC: 25 MG/DL (ref 5–40)
WBC #/AREA URNS HPF: NORMAL /HPF (ref 0–5)

## 2023-05-12 NOTE — PROGRESS NOTES
MyChart:    Here are the result(s) of your test(s):     A1c for average glucose level is improving.   Cholesterol level is overall stable   Kidney function, liver labs are electrolytes are stable/normal.    Urinalysis is normal    Continue current plans.     Please do not hesitate to contact me if you have questions.

## 2023-07-11 NOTE — H&P (VIEW-ONLY)
Subjective:     Encounter Date:07/11/2023      Patient ID: Jeremi Shankar Sr. is a 72 y.o. male.    Chief Complaint:follow up AS  History of Present Illness  This is a 71 y/o man who follows with Dr. Grey and is new to me today. He has a pmxh of hypertension, hyperlipidemia, and aortic stenosis. He was initially seen by Dr. Grey in January 2023 for evaluation of a cardiac murmur. He underwent an echocardiogram at that visit that showed a bicuspid valve with moderate to severe stenosis. Also noted was moderate dilation of the aortic root measuring 4.7 cm. He was asymptomatic in regards to the aortic stenosis. He was instructed to follow up in 6 months with a repeat echocardiogram.     He is here today for his follow up visit. His echocardiogram performed prior to our visit does show worsening valve disease. He still remains asymptomatic with his aortic stenosis. He has been cutting wood and carrying heavy items with no chest pain, shortness of breath or feeling like he may have a syncopal event. He is very active, taking care of 15 acres and working outside a lot. He denies any fatigue. He denies swelling in his lower extremities, orthopnea or PND.     I have reviewed and updated as appropriate allergies, current medications, past family history, past medical history, past surgical history and problem list.    Review of Systems   Constitutional: Negative for fever, malaise/fatigue, weight gain and weight loss.   HENT:  Negative for congestion, hoarse voice and sore throat.    Eyes:  Negative for blurred vision and double vision.   Cardiovascular:  Negative for chest pain, dyspnea on exertion, leg swelling, orthopnea, palpitations and syncope.   Respiratory:  Negative for cough, shortness of breath and wheezing.    Gastrointestinal:  Negative for abdominal pain, hematemesis, hematochezia and melena.   Genitourinary:  Negative for dysuria and hematuria.   Neurological:  Negative for dizziness, headaches,  "light-headedness and numbness.   Psychiatric/Behavioral:  Negative for depression. The patient is not nervous/anxious.        Current Outpatient Medications:     atorvastatin (LIPITOR) 40 MG tablet, Take 1 tablet by mouth Every Night., Disp: 90 tablet, Rfl: 3    ezetimibe (ZETIA) 10 MG tablet, Take 1 tablet by mouth Daily., Disp: 90 tablet, Rfl: 3    losartan-hydrochlorothiazide (HYZAAR) 100-12.5 MG per tablet, TAKE 1 TABLET DAILY, Disp: 90 tablet, Rfl: 3    metFORMIN ER (GLUCOPHAGE-XR) 500 MG 24 hr tablet, TAKE 1 TABLET BY MOUTH WITH DINNER, Disp: 90 tablet, Rfl: 3    Past Medical History:   Diagnosis Date    Arthritis     arthritis is in my hands    Benign essential hypertension     Bilateral carpal tunnel syndrome 2020    Cataract     Eye doctor saw them in exam    Colon polyp  or     Diverticulosis of large intestine 2016    Description: 2015????????colonoscopy????????Dr. Francis????????diverticulosis/colon polyps (2).    Headache 1965    Hyperlipidemia     Type 2 diabetes mellitus     Visual impairment     glasses for reading       Past Surgical History:   Procedure Laterality Date    CARPAL TUNNEL RELEASE Left 2022    COLONOSCOPY      Dr Francis --WNL.  2015ƒ?"Dr. Francisƒ?"colon polyps (2); diverticulosis.    COLONOSCOPY  ?    polyps--DR Francis    HERNIA REPAIR      (R) REPAIR TWICE       Family History   Problem Relation Age of Onset    Hypertension Mother         passed at 87    Heart attack Father 86    Heart disease Father     No Known Problems Sister     Asthma Brother          of covid complications    Coronary artery disease Brother     No Known Problems Brother     Diabetes Maternal Grandfather     Dementia Neg Hx     Colon cancer Neg Hx     Prostate cancer Neg Hx        Social History     Tobacco Use    Smoking status: Never     Passive exposure: Never    Smokeless tobacco: Never    Tobacco comments:     Never Smoked   Vaping Use    Vaping Use: Never " "used   Substance Use Topics    Alcohol use: No    Drug use: Never         ECG 12 Lead    Date/Time: 7/11/2023 11:56 AM  Performed by: Karine Mkcenzie APRN  Authorized by: Karine Mckenzie APRN   Comparison: compared with previous ECG from 1/10/2023  Similar to previous ECG  Rhythm: sinus rhythm  Conduction: 1st degree AV block  Comments: No significant change from previous EKG           Objective:     Visit Vitals  /86   Pulse 56   Ht 177.8 cm (70\")   Wt 81.2 kg (179 lb)   BMI 25.68 kg/mý             Physical Exam  Constitutional:       Appearance: Normal appearance. He is normal weight.   HENT:      Head: Normocephalic.   Neck:      Vascular: No carotid bruit.   Cardiovascular:      Rate and Rhythm: Normal rate and regular rhythm.      Chest Wall: PMI is not displaced.      Pulses: Normal pulses.           Radial pulses are 2+ on the right side and 2+ on the left side.        Posterior tibial pulses are 2+ on the right side and 2+ on the left side.      Heart sounds: Murmur heard.    with a grade of 3/6.     No friction rub. No gallop.   Pulmonary:      Effort: Pulmonary effort is normal.      Breath sounds: Normal breath sounds.   Abdominal:      General: Bowel sounds are normal. There is no distension.      Palpations: Abdomen is soft.   Musculoskeletal:      Right lower leg: No edema.      Left lower leg: No edema.   Skin:     General: Skin is warm and dry.      Capillary Refill: Capillary refill takes less than 2 seconds.   Neurological:      Mental Status: He is alert and oriented to person, place, and time.   Psychiatric:         Mood and Affect: Mood normal.         Behavior: Behavior normal.         Thought Content: Thought content normal.        Lab Review:   Lipid Panel          5/9/2023    09:12   Lipid Panel   Total Cholesterol 149    Triglycerides 142    HDL Cholesterol 45    VLDL Cholesterol 25    LDL Cholesterol  79          Cardiac Procedures:       Assessment:         Diagnoses and all orders " for this visit:    1. Primary hypertension (Primary)    2. Mixed hyperlipidemia    3. Aortic stenosis due to bicuspid aortic valve    Other orders  -     ECG 12 Lead            Plan:       Aortic stenosis: moderate to severe on echo in January 2023. Repeat echo today on preliminary review does show worsening valve disease, likely now in the severe category. I discussed with the patient proceeding with a stress test today to see if any symptoms of his VHD could be induced versus waiting for the echo to be read by a physician to determine if it is in fact severe. He does not want to perform stress test today.  HTN: blood pressure well controlled on current regimen. No changes.  HLD: on statin. Goal LDL < 100.    Thank you for allowing me to participate in this patient's care. Please call with any questions or concerns. Mr. Shankar will follow up with Dr. Grey in 3 months.          Your medication list            Accurate as of July 11, 2023 11:57 AM. If you have any questions, ask your nurse or doctor.                CONTINUE taking these medications        Instructions Last Dose Given Next Dose Due   atorvastatin 40 MG tablet  Commonly known as: LIPITOR      Take 1 tablet by mouth Every Night.       ezetimibe 10 MG tablet  Commonly known as: ZETIA      Take 1 tablet by mouth Daily.       losartan-hydrochlorothiazide 100-12.5 MG per tablet  Commonly known as: HYZAAR      TAKE 1 TABLET DAILY       metFORMIN  MG 24 hr tablet  Commonly known as: GLUCOPHAGE-XR      TAKE 1 TABLET BY MOUTH WITH DINNER                  CLEO Patten  07/11/23  9:18 AM EDT

## 2023-08-01 ENCOUNTER — HOSPITAL ENCOUNTER (OUTPATIENT)
Dept: CT IMAGING | Facility: HOSPITAL | Age: 73
Discharge: HOME | End: 2023-08-01
Admitting: STUDENT IN AN ORGANIZED HEALTH CARE EDUCATION/TRAINING PROGRAM
Payer: MEDICARE

## 2023-08-01 VITALS — HEART RATE: 62 BPM | OXYGEN SATURATION: 98 %

## 2023-08-01 DIAGNOSIS — I35.0 AORTIC STENOSIS, SEVERE: Primary | ICD-10-CM

## 2023-08-01 DIAGNOSIS — I35.0 AORTIC STENOSIS, SEVERE: ICD-10-CM

## 2023-08-01 LAB — CREAT BLDA-MCNC: 1 MG/DL (ref 0.6–1.3)

## 2023-08-01 PROCEDURE — 82565 ASSAY OF CREATININE: CPT

## 2023-08-01 PROCEDURE — 25510000001 IOPAMIDOL PER 1 ML: Performed by: STUDENT IN AN ORGANIZED HEALTH CARE EDUCATION/TRAINING PROGRAM

## 2023-08-01 PROCEDURE — 71275 CT ANGIOGRAPHY CHEST: CPT

## 2023-08-01 PROCEDURE — 74174 CTA ABD&PLVS W/CONTRAST: CPT

## 2023-08-01 RX ADMIN — IOPAMIDOL 95 ML: 755 INJECTION, SOLUTION INTRAVENOUS at 08:50

## 2023-08-02 ENCOUNTER — TELEPHONE (OUTPATIENT)
Dept: CARDIOLOGY | Facility: HOSPITAL | Age: 73
End: 2023-08-02
Payer: MEDICARE

## 2023-08-04 ENCOUNTER — LAB (OUTPATIENT)
Facility: HOSPITAL | Age: 73
End: 2023-08-04
Payer: MEDICARE

## 2023-08-04 DIAGNOSIS — I35.0 AORTIC STENOSIS, SEVERE: Primary | ICD-10-CM

## 2023-08-04 DIAGNOSIS — I35.0 AORTIC STENOSIS, SEVERE: ICD-10-CM

## 2023-08-04 LAB
ANION GAP SERPL CALCULATED.3IONS-SCNC: 10.1 MMOL/L (ref 5–15)
BASOPHILS # BLD AUTO: 0.02 10*3/MM3 (ref 0–0.2)
BASOPHILS NFR BLD AUTO: 0.4 % (ref 0–1.5)
BUN SERPL-MCNC: 14 MG/DL (ref 8–23)
BUN/CREAT SERPL: 13.9 (ref 7–25)
CALCIUM SPEC-SCNC: 10.4 MG/DL (ref 8.6–10.5)
CHLORIDE SERPL-SCNC: 103 MMOL/L (ref 98–107)
CO2 SERPL-SCNC: 27.9 MMOL/L (ref 22–29)
CREAT SERPL-MCNC: 1.01 MG/DL (ref 0.76–1.27)
DEPRECATED RDW RBC AUTO: 41.7 FL (ref 37–54)
EGFRCR SERPLBLD CKD-EPI 2021: 78.5 ML/MIN/1.73
EOSINOPHIL # BLD AUTO: 0.09 10*3/MM3 (ref 0–0.4)
EOSINOPHIL NFR BLD AUTO: 1.6 % (ref 0.3–6.2)
ERYTHROCYTE [DISTWIDTH] IN BLOOD BY AUTOMATED COUNT: 13.9 % (ref 12.3–15.4)
GLUCOSE SERPL-MCNC: 90 MG/DL (ref 65–99)
HCT VFR BLD AUTO: 43.4 % (ref 37.5–51)
HGB BLD-MCNC: 14.3 G/DL (ref 13–17.7)
HOLD SPECIMEN: NORMAL
IMM GRANULOCYTES # BLD AUTO: 0.02 10*3/MM3 (ref 0–0.05)
IMM GRANULOCYTES NFR BLD AUTO: 0.4 % (ref 0–0.5)
LYMPHOCYTES # BLD AUTO: 1.78 10*3/MM3 (ref 0.7–3.1)
LYMPHOCYTES NFR BLD AUTO: 31.3 % (ref 19.6–45.3)
MCH RBC QN AUTO: 27.4 PG (ref 26.6–33)
MCHC RBC AUTO-ENTMCNC: 32.9 G/DL (ref 31.5–35.7)
MCV RBC AUTO: 83.3 FL (ref 79–97)
MONOCYTES # BLD AUTO: 0.57 10*3/MM3 (ref 0.1–0.9)
MONOCYTES NFR BLD AUTO: 10 % (ref 5–12)
NEUTROPHILS NFR BLD AUTO: 3.2 10*3/MM3 (ref 1.7–7)
NEUTROPHILS NFR BLD AUTO: 56.3 % (ref 42.7–76)
NRBC BLD AUTO-RTO: 0 /100 WBC (ref 0–0.2)
PLATELET # BLD AUTO: 239 10*3/MM3 (ref 140–450)
PMV BLD AUTO: 9.8 FL (ref 6–12)
POTASSIUM SERPL-SCNC: 4.8 MMOL/L (ref 3.5–5.2)
RBC # BLD AUTO: 5.21 10*6/MM3 (ref 4.14–5.8)
SODIUM SERPL-SCNC: 141 MMOL/L (ref 136–145)
WBC NRBC COR # BLD: 5.68 10*3/MM3 (ref 3.4–10.8)

## 2023-08-04 PROCEDURE — 36415 COLL VENOUS BLD VENIPUNCTURE: CPT

## 2023-08-04 PROCEDURE — 85025 COMPLETE CBC W/AUTO DIFF WBC: CPT

## 2023-08-04 PROCEDURE — 80048 BASIC METABOLIC PNL TOTAL CA: CPT

## 2023-08-08 ENCOUNTER — HOSPITAL ENCOUNTER (OUTPATIENT)
Facility: HOSPITAL | Age: 73
Setting detail: HOSPITAL OUTPATIENT SURGERY
Discharge: HOME | End: 2023-08-08
Attending: INTERNAL MEDICINE | Admitting: INTERNAL MEDICINE
Payer: MEDICARE

## 2023-08-08 VITALS
OXYGEN SATURATION: 97 % | HEART RATE: 55 BPM | RESPIRATION RATE: 18 BRPM | DIASTOLIC BLOOD PRESSURE: 66 MMHG | WEIGHT: 171.5 LBS | TEMPERATURE: 96.8 F | SYSTOLIC BLOOD PRESSURE: 115 MMHG | BODY MASS INDEX: 24.55 KG/M2 | HEIGHT: 70 IN

## 2023-08-08 DIAGNOSIS — Q23.0 AORTIC STENOSIS DUE TO BICUSPID AORTIC VALVE: Primary | Chronic | ICD-10-CM

## 2023-08-08 DIAGNOSIS — Q23.1 AORTIC STENOSIS DUE TO BICUSPID AORTIC VALVE: Primary | Chronic | ICD-10-CM

## 2023-08-08 DIAGNOSIS — I35.0 AORTIC STENOSIS, SEVERE: ICD-10-CM

## 2023-08-08 LAB — GLUCOSE BLDC GLUCOMTR-MCNC: 106 MG/DL (ref 70–130)

## 2023-08-08 PROCEDURE — 93456 R HRT CORONARY ARTERY ANGIO: CPT | Performed by: INTERNAL MEDICINE

## 2023-08-08 PROCEDURE — 99152 MOD SED SAME PHYS/QHP 5/>YRS: CPT | Performed by: INTERNAL MEDICINE

## 2023-08-08 PROCEDURE — 25010000002 FENTANYL CITRATE (PF) 50 MCG/ML SOLUTION: Performed by: INTERNAL MEDICINE

## 2023-08-08 PROCEDURE — 25010000002 MIDAZOLAM PER 1 MG: Performed by: INTERNAL MEDICINE

## 2023-08-08 PROCEDURE — C1894 INTRO/SHEATH, NON-LASER: HCPCS | Performed by: INTERNAL MEDICINE

## 2023-08-08 PROCEDURE — 25510000001 IOPAMIDOL PER 1 ML: Performed by: INTERNAL MEDICINE

## 2023-08-08 PROCEDURE — C1769 GUIDE WIRE: HCPCS | Performed by: INTERNAL MEDICINE

## 2023-08-08 PROCEDURE — 82948 REAGENT STRIP/BLOOD GLUCOSE: CPT

## 2023-08-08 PROCEDURE — 25010000002 HEPARIN (PORCINE) PER 1000 UNITS: Performed by: INTERNAL MEDICINE

## 2023-08-08 RX ORDER — SODIUM CHLORIDE 9 MG/ML
100 INJECTION, SOLUTION INTRAVENOUS CONTINUOUS
Status: DISCONTINUED | OUTPATIENT
Start: 2023-08-08 | End: 2023-08-08 | Stop reason: HOSPADM

## 2023-08-08 RX ORDER — VERAPAMIL HYDROCHLORIDE 2.5 MG/ML
INJECTION, SOLUTION INTRAVENOUS
Status: DISCONTINUED | OUTPATIENT
Start: 2023-08-08 | End: 2023-08-08 | Stop reason: HOSPADM

## 2023-08-08 RX ORDER — SODIUM CHLORIDE 0.9 % (FLUSH) 0.9 %
10 SYRINGE (ML) INJECTION AS NEEDED
Status: DISCONTINUED | OUTPATIENT
Start: 2023-08-08 | End: 2023-08-08 | Stop reason: HOSPADM

## 2023-08-08 RX ORDER — SODIUM CHLORIDE 0.9 % (FLUSH) 0.9 %
10 SYRINGE (ML) INJECTION EVERY 12 HOURS SCHEDULED
Status: DISCONTINUED | OUTPATIENT
Start: 2023-08-08 | End: 2023-08-08 | Stop reason: HOSPADM

## 2023-08-08 RX ORDER — SODIUM CHLORIDE 0.9 % (FLUSH) 0.9 %
3 SYRINGE (ML) INJECTION EVERY 12 HOURS SCHEDULED
Status: DISCONTINUED | OUTPATIENT
Start: 2023-08-08 | End: 2023-08-08 | Stop reason: HOSPADM

## 2023-08-08 RX ORDER — SODIUM CHLORIDE 9 MG/ML
75 INJECTION, SOLUTION INTRAVENOUS CONTINUOUS
Status: DISCONTINUED | OUTPATIENT
Start: 2023-08-09 | End: 2023-08-08 | Stop reason: HOSPADM

## 2023-08-08 RX ORDER — FENTANYL CITRATE 50 UG/ML
INJECTION, SOLUTION INTRAMUSCULAR; INTRAVENOUS
Status: DISCONTINUED | OUTPATIENT
Start: 2023-08-08 | End: 2023-08-08 | Stop reason: HOSPADM

## 2023-08-08 RX ORDER — HEPARIN SODIUM 1000 [USP'U]/ML
INJECTION, SOLUTION INTRAVENOUS; SUBCUTANEOUS
Status: DISCONTINUED | OUTPATIENT
Start: 2023-08-08 | End: 2023-08-08 | Stop reason: HOSPADM

## 2023-08-08 RX ORDER — SODIUM CHLORIDE 9 MG/ML
40 INJECTION, SOLUTION INTRAVENOUS AS NEEDED
Status: DISCONTINUED | OUTPATIENT
Start: 2023-08-08 | End: 2023-08-08 | Stop reason: HOSPADM

## 2023-08-08 RX ORDER — LIDOCAINE HYDROCHLORIDE 20 MG/ML
INJECTION, SOLUTION INFILTRATION; PERINEURAL
Status: DISCONTINUED | OUTPATIENT
Start: 2023-08-08 | End: 2023-08-08 | Stop reason: HOSPADM

## 2023-08-08 RX ORDER — MIDAZOLAM HYDROCHLORIDE 1 MG/ML
INJECTION INTRAMUSCULAR; INTRAVENOUS
Status: DISCONTINUED | OUTPATIENT
Start: 2023-08-08 | End: 2023-08-08 | Stop reason: HOSPADM

## 2023-08-08 RX ORDER — ACETAMINOPHEN 325 MG/1
650 TABLET ORAL EVERY 4 HOURS PRN
Status: DISCONTINUED | OUTPATIENT
Start: 2023-08-08 | End: 2023-08-08 | Stop reason: HOSPADM

## 2023-08-08 RX ADMIN — SODIUM CHLORIDE 75 ML/HR: 9 INJECTION, SOLUTION INTRAVENOUS at 08:15

## 2023-08-08 NOTE — DISCHARGE INSTRUCTIONS
"        Resume your Metformin in 48 hours    [facility]  [address] [place] [address]    Coronary Angiogram (Radial/Ulnar Approach) After Care    Refer to this sheet in the next few weeks. These instructions provide you with information on caring for yourself after your procedure. Your caregiver may also give you more specific instructions. Your treatment has been planned according to current medical practices, but problems sometimes occur. Call your caregiver if you have any problems or questions after your procedure.    Home Care Instructions:  You may shower the day after the procedure. Remove the bandage (dressing) and gently wash the site with plain soap and water. Gently pat the site dry. You may apply a band aid daily for 2 days if desired.    Do not apply powder or lotion to the site.  Do not submerge the affected site in water for 3 to 5 days or until the site is completely healed.   Do not lift, push or pull anything over 5 pounds for 5 days after your procedure or as directed by your physician.  As a reference, a gallon of milk weighs 8 pounds.   Inspect the site at least twice daily. You may notice some bruising at the site and it may be tender for 1 to 2 weeks.     Increase your fluid intake for the next 2 days.    Keep arm elevated for 24 hours. For the remainder of the day, keep your arm in "Pledge of Allegiance" position when up and about.     You may drive 24 hours after the procedure unless otherwise instructed by your caregiver.  Do not operate machinery or power tools for 24 hours.  A responsible adult should be with you for the first 24 hours after you arrive home. Do not make any important legal decisions or sign legal papers for 24 hours.  Do not drink alcohol for 24 hours.    Metformin or any medications containing Metformin should not be taken for 48 hours after your procedure.      Call Your Doctor if:   You have unusual pain at the radial/ulnar (wrist) site.  You " have redness, warmth, swelling, or pain at the radial/ulnar (wrist) site.  You have drainage (other than a small amount of blood on the dressing).  `You have chills or a fever > 101.  Your arm becomes pale or dark, cool, tingly, or numb.  You develop chest pain, shortness of breath, feel faint or pass out.    You have heavy bleeding from the site, hold pressure on the site for 20 minutes.  If the bleeding stops, apply a fresh bandage and call your cardiologist.  However, if you        continue to have bleeding, call 911 and continue to apply pressure to the site.   You have any symptoms of a stroke.  Remember BE FAST  B-balance. Sudden trouble walking or loss of balance.  E-eyes.  Sudden changes in how you see or a sudden onset of a very bad headache.   F-face. Sudden weakness or loss of feeling of the face or facial droop on one side.   A-arms Sudden weakness or numbness in one arm.  One arm drifts down if they are both held out in front of you. This happens suddenly and usually on one side of the body.   S-speech.  Sudden trouble speaking, slurred speech or trouble understanding what are saying.   T-time  Time to call emergency services.  Write down the symptoms and the time they started.

## 2023-08-08 NOTE — Clinical Note
The right DP pulse is +2. The right PT pulse is +2. The right radial pulse is +2. The right ulnar pulse is +2. The right femoral pulse is +2.

## 2023-08-08 NOTE — Clinical Note
Hemostasis started on the right brachial vein. Manual pressure applied to vessel. Manual pressure was held by AK. Manual pressure was held for 5 min. Hemostasis achieved successfully.

## 2023-08-08 NOTE — Clinical Note
Hemostasis started on the right radial artery. R-Band was used in achieving hemostasis. Radial compression device applied to vessel. Hemostasis achieved successfully. Closure device additional comment: Vasc band with 14 cc of air.

## 2023-08-16 ENCOUNTER — OFFICE VISIT (OUTPATIENT)
Dept: CARDIAC SURGERY | Facility: CLINIC | Age: 73
End: 2023-08-16
Payer: MEDICARE

## 2023-08-16 VITALS
OXYGEN SATURATION: 98 % | DIASTOLIC BLOOD PRESSURE: 81 MMHG | TEMPERATURE: 97.8 F | RESPIRATION RATE: 18 BRPM | HEIGHT: 70 IN | HEART RATE: 62 BPM | SYSTOLIC BLOOD PRESSURE: 147 MMHG | BODY MASS INDEX: 24.34 KG/M2 | WEIGHT: 170 LBS

## 2023-08-16 DIAGNOSIS — Q23.0 AORTIC STENOSIS DUE TO BICUSPID AORTIC VALVE: Chronic | ICD-10-CM

## 2023-08-16 DIAGNOSIS — Q23.1 AORTIC STENOSIS DUE TO BICUSPID AORTIC VALVE: Chronic | ICD-10-CM

## 2023-08-16 DIAGNOSIS — I71.21 ANEURYSM OF ASCENDING AORTA WITHOUT RUPTURE: ICD-10-CM

## 2023-08-16 DIAGNOSIS — I10 PRIMARY HYPERTENSION: Primary | Chronic | ICD-10-CM

## 2023-08-16 PROCEDURE — 3077F SYST BP >= 140 MM HG: CPT | Performed by: THORACIC SURGERY (CARDIOTHORACIC VASCULAR SURGERY)

## 2023-08-16 PROCEDURE — 1159F MED LIST DOCD IN RCRD: CPT | Performed by: THORACIC SURGERY (CARDIOTHORACIC VASCULAR SURGERY)

## 2023-08-16 PROCEDURE — 1160F RVW MEDS BY RX/DR IN RCRD: CPT | Performed by: THORACIC SURGERY (CARDIOTHORACIC VASCULAR SURGERY)

## 2023-08-16 PROCEDURE — 3079F DIAST BP 80-89 MM HG: CPT | Performed by: THORACIC SURGERY (CARDIOTHORACIC VASCULAR SURGERY)

## 2023-08-16 PROCEDURE — 99204 OFFICE O/P NEW MOD 45 MIN: CPT | Performed by: THORACIC SURGERY (CARDIOTHORACIC VASCULAR SURGERY)

## 2023-08-16 NOTE — LETTER
August 25, 2023     Jhon Alvarez MD  [address]  [facility] [address]  [place] [id number]    Patient: Jeremi Shankar Sr.   YOB: 1950   Date of Visit: 8/16/2023     Dear Jhon Alvarez MD:       Thank you for referring Jeremi Shankar to me for evaluation. Below are the relevant portions of my assessment and plan of care.    If you have questions, please do not hesitate to call me. I look forward to following Jeremi along with you.         Sincerely,        Benton Mcduffie MD        CC: MD Reta Anna Sebastian, MD  08/25/23 1650  Sign when Signing Visit  8/25/2023    Seen on 8/15/2023    Reason for consultation:   Evaluation of aortic stenosis    Chief Complaint   Same    History of Present Illness:       Dear Nash and Colleagues,  It was nice to see Jeremi Shankar Sr. in consultation at your request. He is a 72 y.o. male with lipidemia, hypertension, diabetes type 2 and a heart murmur who was evaluated last January with an echocardiogram that showed ejection fraction 61%, a bicuspid aortic valve with diffuse right and left-sided cusps, severe thickening of the leaflets, trace aortic valve regurgitation and severe stenosis with an aortic valve area 0.66 cmý, peak velocity of 4.2 m/s and maximum gradient of 73 mmHg with a mean gradient of 49 mmHg.  The aortic valve dimensionless index is 0.2.  Cardiac catheter showed single-vessel coronary artery disease with 70% proximal LAD lesion.  There was mild dilatation of the aortic root of approximate 4.5 cm. He denies any symptoms, he is very active he cuts wood and he exercise. His chest CT showed the ascending aorta of 4.7 cm without dissection or ulceration.  His aortic root diameter was 3.9 cm and at the sinotubular junction 3.2 cm.  The descending thoracic aorta was approximately 2.5 cm in the mid thoracic area.  There is a lesion in the dome of the left hepatic lobe that is presumed to be a hemangioma.  He has no family history of  "aneurysms, dissections or connective tissue disorders.     Patient Active Problem List   Diagnosis    History of colon polyps    Primary hypertension    Prediabetes    Hyperlipidemia    Peyronie's disease    Aortic stenosis due to bicuspid aortic valve    Aneurysm of ascending aorta without rupture       Past Medical History:   Diagnosis Date    Arthritis 2019    arthritis is in my hands    Benign essential hypertension     Bilateral carpal tunnel syndrome 06/16/2020    Cataract 2021    Eye doctor saw them in exam    Colon polyp 2014 or 2015    Diverticulosis of large intestine 03/06/2016    Description: March 2015????????colonoscopy????????Dr. Francis????????diverticulosis/colon polyps (2).    Headache 1965    Hyperlipidemia     Type 2 diabetes mellitus     Visual impairment     glasses for reading       Past Surgical History:   Procedure Laterality Date    CARDIAC CATHETERIZATION N/A 8/8/2023    Procedure: Right Heart Cath;  Surgeon: Kun Desai MD;  [facility];  Service: Cardiovascular;  Laterality: N/A;    CARDIAC CATHETERIZATION N/A 8/8/2023    Procedure: Coronary angiography;  Surgeon: Kun Desai MD;  [facility]  [facility];  Service: Cardiovascular;  Laterality: N/A;    CARPAL TUNNEL RELEASE Left 05/2022    COLONOSCOPY  2007    Dr Francis --WNL.  March 2015ƒ?"Dr. Francisƒ?"colon polyps (2); diverticulosis.    COLONOSCOPY  2014?    polyps--DR Francis    HERNIA REPAIR      (R) REPAIR TWICE       Allergies   Allergen Reactions    Lisinopril Cough         Current Outpatient Medications:     atorvastatin (LIPITOR) 40 MG tablet, Take 1 tablet by mouth Every Night., Disp: 90 tablet, Rfl: 3    ezetimibe (ZETIA) 10 MG tablet, Take 1 tablet by mouth Daily., Disp: 90 tablet, Rfl: 3    losartan-hydrochlorothiazide (HYZAAR) 100-12.5 MG per tablet, TAKE 1 TABLET DAILY, Disp: 90 tablet, Rfl: 3    metFORMIN ER (GLUCOPHAGE-XR) 500 MG 24 hr " tablet, TAKE 1 TABLET BY MOUTH WITH DINNER, Disp: 90 tablet, Rfl: 3    benzonatate (TESSALON) 200 MG capsule, Take 1 capsule by mouth 3 (Three) Times a Day As Needed for Cough for up to 5 days., Disp: 15 capsule, Rfl: 0    Social History     Socioeconomic History    Marital status:      Spouse name: Mia    Number of children: 2   Tobacco Use    Smoking status: Never     Passive exposure: Never    Smokeless tobacco: Never    Tobacco comments:     Never Smoked   Vaping Use    Vaping Use: Never used   Substance and Sexual Activity    Alcohol use: No    Drug use: Never    Sexual activity: Yes     Partners: Female       Family History   Problem Relation Age of Onset    Hypertension Mother         passed at 87    Heart attack Father 86    Heart disease Father     No Known Problems Sister     Asthma Brother          of covid complications    Coronary artery disease Brother     No Known Problems Brother     Diabetes Maternal Grandfather     Dementia Neg Hx     Colon cancer Neg Hx     Prostate cancer Neg Hx      Review of Systems  As the HPI, otherwise noncontributory    Physical Exam:    Vital Signs:  Weight: 77.1 kg (170 lb)   Body mass index is 24.39 kg/mý.  Temp: 97.8 øF (36.6 øC)   Heart Rate: 62   BP: 147/81     Constitutional:       Appearance: Well-developed.   Eyes:      Conjunctiva/sclera: Conjunctivae normal.      Pupils: Pupils are equal, round, and reactive to light.   HENT:      Head: Normocephalic and atraumatic.      Nose: Nose normal.   Neck:      Thyroid: No thyromegaly.      Vascular: No JVD.      Lymphadenopathy: No cervical adenopathy.   Pulmonary:      Effort: Pulmonary effort is normal.      Breath sounds: Normal breath sounds. No rales.   Cardiovascular:      Normal rate. Regular rhythm.      Murmurs: There is a grade 4/6 harsh midsystolic murmur at the URSB, radiating to the neck.      No gallop.    Pulses:     Intact distal pulses. No decreased pulses.   Edema:      Peripheral edema absent.   Abdominal:      General: Bowel sounds are normal. There is no distension.      Palpations: Abdomen is soft. There is no abdominal mass.      Tenderness: There is no abdominal tenderness.   Musculoskeletal: Normal range of motion.         General: No tenderness or deformity.      Cervical back: Normal range of motion and neck supple. Skin:     General: Skin is warm and dry.      Findings: No erythema or rash.   Neurological:      Mental Status: Alert and oriented to person, place, and time.      Deep Tendon Reflexes: Reflexes are normal and symmetric.   Psychiatric:         Behavior: Behavior normal.       Relevant studies (other than HPI)        Assessment:     Problems Addressed this Visit          Cardiac and Vasculature    Primary hypertension - Primary (Chronic)    Aortic stenosis due to bicuspid aortic valve (Chronic)       Other    Aneurysm of ascending aorta without rupture     Diagnoses         Codes Comments    Primary hypertension    -  Primary ICD-10-CM: I10  ICD-9-CM: 401.9     Aortic stenosis due to bicuspid aortic valve     ICD-10-CM: Q23.0, Q23.1  ICD-9-CM: 746.3, 746.4     Aneurysm of ascending aorta without rupture     ICD-10-CM: I71.21  ICD-9-CM: 441.2             Assessment/recommendation:     Severe aortic stenosis with a bicuspid aortic valve and aortopathy.  He has documented gradients of severity but he is fairly asymptomatic.  He has a 4.7 cm ascending aortic aneurysm with mild dilatation of the aortic root.  He has single-vessel coronary artery disease with a focal lesion in the proximal LAD of 70%.  I recommend aortic valve replacement with a biologic prosthesis, ascending aortic replacement most likely interposition graft and a CABG with a LIMA to the LAD.  I recommend surgery to prolong survival, symptomatic relief and to prevent adverse events.  I discussed the risks (STS calculated for AVR CABG), benefits and terms of surgery and he wishes to proceed.  He will  need routine preoperative work-up including vascular test for the carotids and routine labs and pulmonary tests.  I quoted a risk of surgery for mortality of less than 1% and complications less than 5 to 10%.  Complications include bleeding, infection, stroke, renal and pulmonary failure, prolonged ventilation and hospitalization among others.    Thank you for allowing me to participate in his care.    Regards,    Benton Mcduffie M.D.    I spent over 50 minutes before, during and after the office visit in reviewing the records, examining the patient, reviewing and interpreting myself the echocardiogram, the cardiac cath and the CT scan, discussing the findings and options with the patient and family and the cardiology team, discussing the natural history of aneurysm disease and aortic stenosis and the guidelines for intervention and spent time in documenting in the electronic record

## 2023-08-17 ENCOUNTER — PATIENT ROUNDING (BHMG ONLY) (OUTPATIENT)
Dept: CARDIAC SURGERY | Facility: CLINIC | Age: 73
End: 2023-08-17
Payer: MEDICARE

## 2023-08-17 NOTE — PROGRESS NOTES
A My Chart message has been sent to the patient for PATIENT ROUNDING with [facility]

## 2023-08-21 ENCOUNTER — TELEPHONE (OUTPATIENT)
Dept: CARDIAC SURGERY | Facility: CLINIC | Age: 73
End: 2023-08-21
Payer: MEDICARE

## 2023-08-22 ENCOUNTER — PREP FOR SURGERY (OUTPATIENT)
Dept: OTHER | Facility: HOSPITAL | Age: 73
End: 2023-08-22
Payer: MEDICARE

## 2023-08-22 DIAGNOSIS — I35.0 NONRHEUMATIC AORTIC (VALVE) STENOSIS: ICD-10-CM

## 2023-08-22 DIAGNOSIS — Q23.1 AORTIC STENOSIS DUE TO BICUSPID AORTIC VALVE: Primary | ICD-10-CM

## 2023-08-22 DIAGNOSIS — I72.8 ANEURYSM OF OTHER SPECIFIED ARTERIES: ICD-10-CM

## 2023-08-22 DIAGNOSIS — R79.1 ABNORMAL COAGULATION PROFILE: ICD-10-CM

## 2023-08-22 DIAGNOSIS — R79.9 ABNORMAL FINDING OF BLOOD CHEMISTRY, UNSPECIFIED: ICD-10-CM

## 2023-08-22 DIAGNOSIS — Q23.0 AORTIC STENOSIS DUE TO BICUSPID AORTIC VALVE: Primary | ICD-10-CM

## 2023-08-22 DIAGNOSIS — I50.32 CHRONIC DIASTOLIC (CONGESTIVE) HEART FAILURE: ICD-10-CM

## 2023-08-22 DIAGNOSIS — I71.22 ANEURYSM OF THE AORTIC ARCH, WITHOUT RUPTURE: ICD-10-CM

## 2023-08-22 RX ORDER — CHLORHEXIDINE GLUCONATE 500 MG/1
1 CLOTH TOPICAL EVERY 12 HOURS PRN
OUTPATIENT
Start: 2023-08-22

## 2023-08-22 RX ORDER — CHLORHEXIDINE GLUCONATE 0.12 MG/ML
15 RINSE ORAL EVERY 12 HOURS
OUTPATIENT
Start: 2023-08-22 | End: 2023-08-23

## 2023-08-22 RX ORDER — CHLORHEXIDINE GLUCONATE 0.12 MG/ML
15 RINSE ORAL ONCE
OUTPATIENT
Start: 2023-08-22 | End: 2023-08-22

## 2023-08-22 RX ORDER — CEFAZOLIN SODIUM 2 G/100ML
2000 INJECTION, SOLUTION INTRAVENOUS ONCE
OUTPATIENT
Start: 2023-08-22 | End: 2023-08-22

## 2023-08-25 ENCOUNTER — TELEPHONE (OUTPATIENT)
Dept: CARDIAC SURGERY | Facility: CLINIC | Age: 73
End: 2023-08-25
Payer: MEDICARE

## 2023-08-25 PROBLEM — I71.21 ANEURYSM OF ASCENDING AORTA WITHOUT RUPTURE: Status: ACTIVE | Noted: 2023-08-25

## 2023-08-25 NOTE — TELEPHONE ENCOUNTER
Spoke to patient with PAT and surgery instructions. PAT is on 9-5-2023 at 0800 with any testing to follow.Surgery is on 9-6-2023 at 0730 with an arrival time of 0500.Expressed verbal understanding of these instructions.Instructed to call with any further questions.

## 2023-08-25 NOTE — TELEPHONE ENCOUNTER
k symptomatic and tested positive for covid today 8/25/2023. Surgery scheduled on 9/6/23 cancelled. Will discuss with  on when to reschedule patient.

## 2023-08-25 NOTE — PROGRESS NOTES
8/25/2023    Seen on 8/15/2023    Reason for consultation:   Evaluation of aortic stenosis    Chief Complaint   Same    History of Present Illness:       Dear Nash and Colleagues,  It was nice to see Jeremi Shankar Sr. in consultation at your request. He is a 72 y.o. male with lipidemia, hypertension, diabetes type 2 and a heart murmur who was evaluated last January with an echocardiogram that showed ejection fraction 61%, a bicuspid aortic valve with diffuse right and left-sided cusps, severe thickening of the leaflets, trace aortic valve regurgitation and severe stenosis with an aortic valve area 0.66 cmý, peak velocity of 4.2 m/s and maximum gradient of 73 mmHg with a mean gradient of 49 mmHg.  The aortic valve dimensionless index is 0.2.  Cardiac catheter showed single-vessel coronary artery disease with 70% proximal LAD lesion.  There was mild dilatation of the aortic root of approximate 4.5 cm. He denies any symptoms, he is very active he cuts wood and he exercise. His chest CT showed the ascending aorta of 4.7 cm without dissection or ulceration.  His aortic root diameter was 3.9 cm and at the sinotubular junction 3.2 cm.  The descending thoracic aorta was approximately 2.5 cm in the mid thoracic area.  There is a lesion in the dome of the left hepatic lobe that is presumed to be a hemangioma.  He has no family history of aneurysms, dissections or connective tissue disorders.     Patient Active Problem List   Diagnosis    History of colon polyps    Primary hypertension    Prediabetes    Hyperlipidemia    Peyronie's disease    Aortic stenosis due to bicuspid aortic valve    Aneurysm of ascending aorta without rupture       Past Medical History:   Diagnosis Date    Arthritis 2019    arthritis is in my hands    Benign essential hypertension     Bilateral carpal tunnel syndrome 06/16/2020    Cataract 2021    Eye doctor saw them in exam    Colon polyp 2014 or 2015    Diverticulosis of large intestine  "03/06/2016    Description: March 2015????????colonoscopy????????Dr. Francis????????diverticulosis/colon polyps (2).    Headache 1965    Hyperlipidemia     Type 2 diabetes mellitus     Visual impairment     glasses for reading       Past Surgical History:   Procedure Laterality Date    CARDIAC CATHETERIZATION N/A 8/8/2023    Procedure: Right Heart Cath;  Surgeon: Kun Desai MD;  Location[facility]  [facility];  Service: Cardiovascular;  Laterality: N/A;    CARDIAC CATHETERIZATION N/A 8/8/2023    Procedure: Coronary angiography;  Surgeon: Kun Desai MD;  [facility]  [facility];  Service: Cardiovascular;  Laterality: N/A;    CARPAL TUNNEL RELEASE Left 05/2022    COLONOSCOPY  2007    Dr Francis --WNL.  March 2015ƒ?"Dr. Francisƒ?"colon polyps (2); diverticulosis.    COLONOSCOPY  2014?    polyps--DR Francis    HERNIA REPAIR      (R) REPAIR TWICE       Allergies   Allergen Reactions    Lisinopril Cough         Current Outpatient Medications:     atorvastatin (LIPITOR) 40 MG tablet, Take 1 tablet by mouth Every Night., Disp: 90 tablet, Rfl: 3    ezetimibe (ZETIA) 10 MG tablet, Take 1 tablet by mouth Daily., Disp: 90 tablet, Rfl: 3    losartan-hydrochlorothiazide (HYZAAR) 100-12.5 MG per tablet, TAKE 1 TABLET DAILY, Disp: 90 tablet, Rfl: 3    metFORMIN ER (GLUCOPHAGE-XR) 500 MG 24 hr tablet, TAKE 1 TABLET BY MOUTH WITH DINNER, Disp: 90 tablet, Rfl: 3    benzonatate (TESSALON) 200 MG capsule, Take 1 capsule by mouth 3 (Three) Times a Day As Needed for Cough for up to 5 days., Disp: 15 capsule, Rfl: 0    Social History     Socioeconomic History    Marital status:      Spouse name: Mia    Number of children: 2   Tobacco Use    Smoking status: Never     Passive exposure: Never    Smokeless tobacco: Never    Tobacco comments:     Never Smoked   Vaping Use    Vaping Use: Never used   Substance and Sexual Activity    Alcohol use: No    Drug use: Never    Sexual activity: Yes     Partners: " Female       Family History   Problem Relation Age of Onset    Hypertension Mother         passed at 87    Heart attack Father 86    Heart disease Father     No Known Problems Sister     Asthma Brother          of covid complications    Coronary artery disease Brother     No Known Problems Brother     Diabetes Maternal Grandfather     Dementia Neg Hx     Colon cancer Neg Hx     Prostate cancer Neg Hx      Review of Systems  As the HPI, otherwise noncontributory    Physical Exam:    Vital Signs:  Weight: 77.1 kg (170 lb)   Body mass index is 24.39 kg/mý.  Temp: 97.8 øF (36.6 øC)   Heart Rate: 62   BP: 147/81     Constitutional:       Appearance: Well-developed.   Eyes:      Conjunctiva/sclera: Conjunctivae normal.      Pupils: Pupils are equal, round, and reactive to light.   HENT:      Head: Normocephalic and atraumatic.      Nose: Nose normal.   Neck:      Thyroid: No thyromegaly.      Vascular: No JVD.      Lymphadenopathy: No cervical adenopathy.   Pulmonary:      Effort: Pulmonary effort is normal.      Breath sounds: Normal breath sounds. No rales.   Cardiovascular:      Normal rate. Regular rhythm.      Murmurs: There is a grade 4/6 harsh midsystolic murmur at the URSB, radiating to the neck.      No gallop.    Pulses:     Intact distal pulses. No decreased pulses.   Edema:     Peripheral edema absent.   Abdominal:      General: Bowel sounds are normal. There is no distension.      Palpations: Abdomen is soft. There is no abdominal mass.      Tenderness: There is no abdominal tenderness.   Musculoskeletal: Normal range of motion.         General: No tenderness or deformity.      Cervical back: Normal range of motion and neck supple. Skin:     General: Skin is warm and dry.      Findings: No erythema or rash.   Neurological:      Mental Status: Alert and oriented to person, place, and time.      Deep Tendon Reflexes: Reflexes are normal and symmetric.   Psychiatric:         Behavior: Behavior normal.        Relevant studies (other than HPI)        Assessment:     Problems Addressed this Visit          Cardiac and Vasculature    Primary hypertension - Primary (Chronic)    Aortic stenosis due to bicuspid aortic valve (Chronic)       Other    Aneurysm of ascending aorta without rupture     Diagnoses         Codes Comments    Primary hypertension    -  Primary ICD-10-CM: I10  ICD-9-CM: 401.9     Aortic stenosis due to bicuspid aortic valve     ICD-10-CM: Q23.0, Q23.1  ICD-9-CM: 746.3, 746.4     Aneurysm of ascending aorta without rupture     ICD-10-CM: I71.21  ICD-9-CM: 441.2             Assessment/recommendation:     Severe aortic stenosis with a bicuspid aortic valve and aortopathy.  He has documented gradients of severity but he is fairly asymptomatic.  He has a 4.7 cm ascending aortic aneurysm with mild dilatation of the aortic root.  He has single-vessel coronary artery disease with a focal lesion in the proximal LAD of 70%.  I recommend aortic valve replacement with a biologic prosthesis, ascending aortic replacement most likely interposition graft and a CABG with a LIMA to the LAD.  I recommend surgery to prolong survival, symptomatic relief and to prevent adverse events.  I discussed the risks (STS calculated for AVR CABG), benefits and terms of surgery and he wishes to proceed.  He will need routine preoperative work-up including vascular test for the carotids and routine labs and pulmonary tests.  I quoted a risk of surgery for mortality of less than 1% and complications less than 5 to 10%.  Complications include bleeding, infection, stroke, renal and pulmonary failure, prolonged ventilation and hospitalization among others.    Thank you for allowing me to participate in his care.    Regards,    Benton Mcduffie M.D.    I spent over 50 minutes before, during and after the office visit in reviewing the records, examining the patient, reviewing and interpreting myself the echocardiogram, the cardiac cath and  the CT scan, discussing the findings and options with the patient and family and the cardiology team, discussing the natural history of aneurysm disease and aortic stenosis and the guidelines for intervention and spent time in documenting in the electronic record

## 2023-08-29 DIAGNOSIS — I10 BENIGN ESSENTIAL HYPERTENSION: ICD-10-CM

## 2023-08-29 RX ORDER — LOSARTAN POTASSIUM AND HYDROCHLOROTHIAZIDE 12.5; 1 MG/1; MG/1
TABLET ORAL
Qty: 90 TABLET | Refills: 0 | Status: SHIPPED | OUTPATIENT
Start: 2023-08-29

## 2023-08-29 NOTE — TELEPHONE ENCOUNTER
Discussed with , call patient for update on 9/8/23. Depending on recovery and symptoms patient having will discuss with  for further recommendation.    Spoke with  advised I would f/u on 9/8/23. She verbalized understanding and this was agreeable.

## 2023-09-14 ENCOUNTER — TELEPHONE (OUTPATIENT)
Dept: CARDIAC SURGERY | Facility: CLINIC | Age: 73
End: 2023-09-14
Payer: MEDICARE

## 2023-09-15 ENCOUNTER — TELEPHONE (OUTPATIENT)
Dept: CARDIAC SURGERY | Facility: CLINIC | Age: 73
End: 2023-09-15
Payer: MEDICARE

## 2023-09-20 ENCOUNTER — TELEPHONE (OUTPATIENT)
Dept: CARDIAC SURGERY | Facility: CLINIC | Age: 73
End: 2023-09-20
Payer: MEDICARE

## 2023-09-20 NOTE — TELEPHONE ENCOUNTER
Called and spoke to patient about current insurance issues with [facility].  Patient was aware as he had received letter from [facility].  He has applied for continuity of care extension so that he can have his surgery.  He is going to let us know before his PAT appointment on 9/29/23 whether or not he may need to postpone his surgery.

## 2023-09-27 ENCOUNTER — TELEPHONE (OUTPATIENT)
Dept: CARDIAC SURGERY | Facility: CLINIC | Age: 73
End: 2023-09-27
Payer: MEDICARE

## 2023-09-27 NOTE — TELEPHONE ENCOUNTER
Spoke to wife with change of date for surgery to 10-3-2023 at 0730, arrival time is now 0500. PAT will remain on 9-.

## 2023-09-28 DIAGNOSIS — E78.2 MIXED HYPERLIPIDEMIA: ICD-10-CM

## 2023-09-29 ENCOUNTER — HOSPITAL ENCOUNTER (OUTPATIENT)
Dept: GENERAL RADIOLOGY | Facility: HOSPITAL | Age: 73
Discharge: HOME | End: 2023-09-29
Payer: MEDICARE

## 2023-09-29 ENCOUNTER — PRE-ADMISSION TESTING (OUTPATIENT)
Dept: PREADMISSION TESTING | Facility: HOSPITAL | Age: 73
End: 2023-09-29
Payer: MEDICARE

## 2023-09-29 ENCOUNTER — HOSPITAL ENCOUNTER (OUTPATIENT)
Dept: CARDIOLOGY | Facility: HOSPITAL | Age: 73
Discharge: HOME | End: 2023-09-29
Payer: MEDICARE

## 2023-09-29 ENCOUNTER — PREP FOR SURGERY (OUTPATIENT)
Dept: OTHER | Facility: HOSPITAL | Age: 73
End: 2023-09-29
Payer: MEDICARE

## 2023-09-29 ENCOUNTER — ANESTHESIA EVENT (OUTPATIENT)
Dept: PERIOP | Facility: HOSPITAL | Age: 73
DRG: 221 | End: 2023-09-29
Payer: MEDICARE

## 2023-09-29 VITALS
BODY MASS INDEX: 26.47 KG/M2 | HEART RATE: 62 BPM | SYSTOLIC BLOOD PRESSURE: 128 MMHG | HEIGHT: 69 IN | DIASTOLIC BLOOD PRESSURE: 75 MMHG | WEIGHT: 178.7 LBS | OXYGEN SATURATION: 98 % | TEMPERATURE: 97.6 F | RESPIRATION RATE: 16 BRPM

## 2023-09-29 DIAGNOSIS — R79.1 ABNORMAL COAGULATION PROFILE: ICD-10-CM

## 2023-09-29 DIAGNOSIS — Q23.0 AORTIC STENOSIS DUE TO BICUSPID AORTIC VALVE: ICD-10-CM

## 2023-09-29 DIAGNOSIS — I72.8 ANEURYSM OF OTHER SPECIFIED ARTERIES: ICD-10-CM

## 2023-09-29 DIAGNOSIS — Q23.1 AORTIC STENOSIS DUE TO BICUSPID AORTIC VALVE: ICD-10-CM

## 2023-09-29 DIAGNOSIS — R79.9 ABNORMAL FINDING OF BLOOD CHEMISTRY, UNSPECIFIED: ICD-10-CM

## 2023-09-29 DIAGNOSIS — I25.10 CORONARY ARTERY DISEASE INVOLVING NATIVE HEART, UNSPECIFIED VESSEL OR LESION TYPE, UNSPECIFIED WHETHER ANGINA PRESENT: Primary | ICD-10-CM

## 2023-09-29 DIAGNOSIS — I71.22 ANEURYSM OF THE AORTIC ARCH, WITHOUT RUPTURE: ICD-10-CM

## 2023-09-29 DIAGNOSIS — I50.32 CHRONIC DIASTOLIC (CONGESTIVE) HEART FAILURE: ICD-10-CM

## 2023-09-29 DIAGNOSIS — I25.10 CORONARY ARTERY DISEASE INVOLVING NATIVE HEART, UNSPECIFIED VESSEL OR LESION TYPE, UNSPECIFIED WHETHER ANGINA PRESENT: ICD-10-CM

## 2023-09-29 LAB
ABO GROUP BLD: NORMAL
ALBUMIN SERPL-MCNC: 4.8 G/DL (ref 3.5–5.2)
ALBUMIN/GLOB SERPL: 1.8 G/DL
ALP SERPL-CCNC: 82 U/L (ref 39–117)
ALT SERPL W P-5'-P-CCNC: 16 U/L (ref 1–41)
ANION GAP SERPL CALCULATED.3IONS-SCNC: 6.4 MMOL/L (ref 5–15)
APTT PPP: 27.1 SECONDS (ref 22.7–35.4)
ARTERIAL PATENCY WRIST A: ABNORMAL
AST SERPL-CCNC: 19 U/L (ref 1–40)
ATMOSPHERIC PRESS: 751.5 MMHG
BASE EXCESS BLDA CALC-SCNC: -0.6 MMOL/L (ref 0–2)
BASOPHILS # BLD AUTO: 0.02 10*3/MM3 (ref 0–0.2)
BASOPHILS NFR BLD AUTO: 0.4 % (ref 0–1.5)
BDY SITE: ABNORMAL
BH CV XLRA MEAS - DIST GSV CALF DIST LEFT: 0.15 CM
BH CV XLRA MEAS - DIST GSV CALF DIST RIGHT: 0.16 CM
BH CV XLRA MEAS - DIST GSV THIGH DIST LEFT: 0.18 CM
BH CV XLRA MEAS - DIST GSV THIGH DIST RIGHT: 0.17 CM
BH CV XLRA MEAS - DIST LSV CALF DIST LEFT: 0.15 CM
BH CV XLRA MEAS - DIST LSV CALF DIST RIGHT: 0.13 CM
BH CV XLRA MEAS - GSV ANKLE DIST LEFT: 0.15 CM
BH CV XLRA MEAS - GSV ANKLE DIST RIGHT: 0.12 CM
BH CV XLRA MEAS - GSV KNEE DIST LEFT: 0.18 CM
BH CV XLRA MEAS - GSV KNEE DIST RIGHT: 0.1 CM
BH CV XLRA MEAS - GSV ORIGIN DIST LEFT: 0.66 CM
BH CV XLRA MEAS - GSV ORIGIN DIST RIGHT: 0.99 CM
BH CV XLRA MEAS - MID GSV CALF LEFT: 0.12 CM
BH CV XLRA MEAS - MID GSV CALF RIGHT: 0.17 CM
BH CV XLRA MEAS - MID GSV THIGH  LEFT: 0.16 CM
BH CV XLRA MEAS - MID GSV THIGH  RIGHT: 0.19 CM
BH CV XLRA MEAS - MID LSV CALF DIST LEFT: 0.18 CM
BH CV XLRA MEAS - MID LSV CALF DIST RIGHT: 0.16 CM
BH CV XLRA MEAS - PROX GSV CALF DIST LEFT: 0.16 CM
BH CV XLRA MEAS - PROX GSV CALF DIST RIGHT: 0.13 CM
BH CV XLRA MEAS - PROX GSV THIGH  LEFT: 0.23 CM
BH CV XLRA MEAS - PROX GSV THIGH  RIGHT: 0.22 CM
BH CV XLRA MEAS - PROX LSV CALF DIST LEFT: 0.25 CM
BH CV XLRA MEAS - PROX LSV CALF DIST RIGHT: 0.21 CM
BH CV XLRA MEAS LEFT DIST CCA EDV: -14.9 CM/SEC
BH CV XLRA MEAS LEFT DIST CCA PSV: -55.7 CM/SEC
BH CV XLRA MEAS LEFT DIST ICA EDV: -19.6 CM/SEC
BH CV XLRA MEAS LEFT DIST ICA PSV: -55.1 CM/SEC
BH CV XLRA MEAS LEFT ICA/CCA RATIO: 0.99
BH CV XLRA MEAS LEFT MID ICA EDV: -20.1 CM/SEC
BH CV XLRA MEAS LEFT MID ICA PSV: -55 CM/SEC
BH CV XLRA MEAS LEFT PROX CCA EDV: 20.3 CM/SEC
BH CV XLRA MEAS LEFT PROX CCA PSV: 92.5 CM/SEC
BH CV XLRA MEAS LEFT PROX ECA EDV: -11.1 CM/SEC
BH CV XLRA MEAS LEFT PROX ECA PSV: -71.3 CM/SEC
BH CV XLRA MEAS LEFT PROX ICA EDV: -18 CM/SEC
BH CV XLRA MEAS LEFT PROX ICA PSV: -48.6 CM/SEC
BH CV XLRA MEAS LEFT PROX SCLA PSV: 98.1 CM/SEC
BH CV XLRA MEAS LEFT VERTEBRAL A EDV: -12.3 CM/SEC
BH CV XLRA MEAS LEFT VERTEBRAL A PSV: -43.4 CM/SEC
BH CV XLRA MEAS RIGHT DIST CCA EDV: -12.2 CM/SEC
BH CV XLRA MEAS RIGHT DIST CCA PSV: -50.5 CM/SEC
BH CV XLRA MEAS RIGHT DIST ICA EDV: -35.7 CM/SEC
BH CV XLRA MEAS RIGHT DIST ICA PSV: -99.5 CM/SEC
BH CV XLRA MEAS RIGHT ICA/CCA RATIO: 1.97
BH CV XLRA MEAS RIGHT MID ICA EDV: -18 CM/SEC
BH CV XLRA MEAS RIGHT MID ICA PSV: -60.8 CM/SEC
BH CV XLRA MEAS RIGHT PROX CCA EDV: -11.8 CM/SEC
BH CV XLRA MEAS RIGHT PROX CCA PSV: -52.5 CM/SEC
BH CV XLRA MEAS RIGHT PROX ECA EDV: -7.1 CM/SEC
BH CV XLRA MEAS RIGHT PROX ECA PSV: -61.1 CM/SEC
BH CV XLRA MEAS RIGHT PROX ICA EDV: -10.2 CM/SEC
BH CV XLRA MEAS RIGHT PROX ICA PSV: -51.7 CM/SEC
BH CV XLRA MEAS RIGHT PROX SCLA PSV: 105.1 CM/SEC
BH CV XLRA MEAS RIGHT VERTEBRAL A EDV: 10.2 CM/SEC
BH CV XLRA MEAS RIGHT VERTEBRAL A PSV: 45.9 CM/SEC
BILIRUB SERPL-MCNC: 0.4 MG/DL (ref 0–1.2)
BILIRUB UR QL STRIP: NEGATIVE
BLD GP AB SCN SERPL QL: NEGATIVE
BUN SERPL-MCNC: 15 MG/DL (ref 8–23)
BUN/CREAT SERPL: 15.6 (ref 7–25)
CALCIUM SPEC-SCNC: 9.9 MG/DL (ref 8.6–10.5)
CHLORIDE SERPL-SCNC: 103 MMOL/L (ref 98–107)
CHOLEST SERPL-MCNC: 127 MG/DL (ref 0–200)
CLARITY UR: CLEAR
CLOSE TME COLL+ADP + EPINEP PNL BLD: 97 % (ref 86–100)
CO2 BLDA-SCNC: 25.7 MMOL/L (ref 23–27)
CO2 SERPL-SCNC: 28.6 MMOL/L (ref 22–29)
COLOR UR: YELLOW
CREAT SERPL-MCNC: 0.96 MG/DL (ref 0.76–1.27)
DEPRECATED RDW RBC AUTO: 43.7 FL (ref 37–54)
EGFRCR SERPLBLD CKD-EPI 2021: 83.5 ML/MIN/1.73
EOSINOPHIL # BLD AUTO: 0.09 10*3/MM3 (ref 0–0.4)
EOSINOPHIL NFR BLD AUTO: 1.9 % (ref 0.3–6.2)
ERYTHROCYTE [DISTWIDTH] IN BLOOD BY AUTOMATED COUNT: 14.6 % (ref 12.3–15.4)
GLOBULIN UR ELPH-MCNC: 2.7 GM/DL
GLUCOSE SERPL-MCNC: 110 MG/DL (ref 65–99)
GLUCOSE UR STRIP-MCNC: NEGATIVE MG/DL
HBA1C MFR BLD: 6.1 % (ref 4.8–5.6)
HCO3 BLDA-SCNC: 24.4 MMOL/L (ref 22–28)
HCT VFR BLD AUTO: 42.7 % (ref 37.5–51)
HDLC SERPL-MCNC: 48 MG/DL (ref 40–60)
HEMODILUTION: NO
HGB BLD-MCNC: 14 G/DL (ref 13–17.7)
HGB UR QL STRIP.AUTO: NEGATIVE
HOLD SPECIMEN: NORMAL
IMM GRANULOCYTES # BLD AUTO: 0.02 10*3/MM3 (ref 0–0.05)
IMM GRANULOCYTES NFR BLD AUTO: 0.4 % (ref 0–0.5)
INR PPP: 0.9 (ref 0.9–1.1)
KETONES UR QL STRIP: NEGATIVE
LDLC SERPL CALC-MCNC: 58 MG/DL (ref 0–100)
LDLC/HDLC SERPL: 1.17 {RATIO}
LEFT ARM BP: NORMAL MMHG
LEUKOCYTE ESTERASE UR QL STRIP.AUTO: NEGATIVE
LYMPHOCYTES # BLD AUTO: 1.56 10*3/MM3 (ref 0.7–3.1)
LYMPHOCYTES NFR BLD AUTO: 32.2 % (ref 19.6–45.3)
MAGNESIUM SERPL-MCNC: 2.1 MG/DL (ref 1.6–2.4)
MCH RBC QN AUTO: 27.6 PG (ref 26.6–33)
MCHC RBC AUTO-ENTMCNC: 32.8 G/DL (ref 31.5–35.7)
MCV RBC AUTO: 84.2 FL (ref 79–97)
MODALITY: ABNORMAL
MONOCYTES # BLD AUTO: 0.43 10*3/MM3 (ref 0.1–0.9)
MONOCYTES NFR BLD AUTO: 8.9 % (ref 5–12)
NEUTROPHILS NFR BLD AUTO: 2.73 10*3/MM3 (ref 1.7–7)
NEUTROPHILS NFR BLD AUTO: 56.2 % (ref 42.7–76)
NITRITE UR QL STRIP: NEGATIVE
NRBC BLD AUTO-RTO: 0 /100 WBC (ref 0–0.2)
NT-PROBNP SERPL-MCNC: <36 PG/ML (ref 0–900)
PCO2 BLDA: 40.6 MM HG (ref 35–45)
PH BLDA: 7.39 PH UNITS (ref 7.35–7.45)
PH UR STRIP.AUTO: 6.5 [PH] (ref 5–8)
PLATELET # BLD AUTO: 198 10*3/MM3 (ref 140–450)
PMV BLD AUTO: 9.4 FL (ref 6–12)
PO2 BLDA: 82.8 MM HG (ref 80–100)
POTASSIUM SERPL-SCNC: 4.2 MMOL/L (ref 3.5–5.2)
PROT SERPL-MCNC: 7.5 G/DL (ref 6–8.5)
PROT UR QL STRIP: NEGATIVE
PROTHROMBIN TIME: 12.3 SECONDS (ref 11.7–14.2)
QT INTERVAL: 402 MS
QTC INTERVAL: 388 MS
RBC # BLD AUTO: 5.07 10*6/MM3 (ref 4.14–5.8)
RH BLD: POSITIVE
RIGHT ARM BP: NORMAL MMHG
SAO2 % BLDCOA: 96 % (ref 92–98.5)
SARS-COV-2 RNA RESP QL NAA+PROBE: NOT DETECTED
SODIUM SERPL-SCNC: 138 MMOL/L (ref 136–145)
SP GR UR STRIP: 1.01 (ref 1–1.03)
T&S EXPIRATION DATE: NORMAL
TOTAL RATE: 18 BREATHS/MINUTE
TRIGL SERPL-MCNC: 115 MG/DL (ref 0–150)
UROBILINOGEN UR QL STRIP: NORMAL
VLDLC SERPL-MCNC: 21 MG/DL (ref 5–40)
WBC NRBC COR # BLD: 4.85 10*3/MM3 (ref 3.4–10.8)

## 2023-09-29 PROCEDURE — 85610 PROTHROMBIN TIME: CPT

## 2023-09-29 PROCEDURE — 86850 RBC ANTIBODY SCREEN: CPT

## 2023-09-29 PROCEDURE — 87635 SARS-COV-2 COVID-19 AMP PRB: CPT

## 2023-09-29 PROCEDURE — 82803 BLOOD GASES ANY COMBINATION: CPT | Performed by: THORACIC SURGERY (CARDIOTHORACIC VASCULAR SURGERY)

## 2023-09-29 PROCEDURE — 81003 URINALYSIS AUTO W/O SCOPE: CPT

## 2023-09-29 PROCEDURE — 86900 BLOOD TYPING SEROLOGIC ABO: CPT

## 2023-09-29 PROCEDURE — 36600 WITHDRAWAL OF ARTERIAL BLOOD: CPT | Performed by: THORACIC SURGERY (CARDIOTHORACIC VASCULAR SURGERY)

## 2023-09-29 PROCEDURE — 83036 HEMOGLOBIN GLYCOSYLATED A1C: CPT

## 2023-09-29 PROCEDURE — 85025 COMPLETE CBC W/AUTO DIFF WBC: CPT

## 2023-09-29 PROCEDURE — 83735 ASSAY OF MAGNESIUM: CPT

## 2023-09-29 PROCEDURE — 93880 EXTRACRANIAL BILAT STUDY: CPT

## 2023-09-29 PROCEDURE — 80061 LIPID PANEL: CPT

## 2023-09-29 PROCEDURE — 80053 COMPREHEN METABOLIC PANEL: CPT

## 2023-09-29 PROCEDURE — 86920 COMPATIBILITY TEST SPIN: CPT

## 2023-09-29 PROCEDURE — 93970 EXTREMITY STUDY: CPT

## 2023-09-29 PROCEDURE — 83880 ASSAY OF NATRIURETIC PEPTIDE: CPT

## 2023-09-29 PROCEDURE — 85730 THROMBOPLASTIN TIME PARTIAL: CPT

## 2023-09-29 PROCEDURE — 71046 X-RAY EXAM CHEST 2 VIEWS: CPT

## 2023-09-29 PROCEDURE — 36415 COLL VENOUS BLD VENIPUNCTURE: CPT

## 2023-09-29 PROCEDURE — 85576 BLOOD PLATELET AGGREGATION: CPT

## 2023-09-29 PROCEDURE — 86901 BLOOD TYPING SEROLOGIC RH(D): CPT

## 2023-09-29 PROCEDURE — 93005 ELECTROCARDIOGRAM TRACING: CPT

## 2023-09-29 RX ORDER — CHLORHEXIDINE GLUCONATE 500 MG/1
1 CLOTH TOPICAL EVERY 12 HOURS PRN
Status: ACTIVE | OUTPATIENT
Start: 2023-09-29

## 2023-09-29 RX ORDER — CHLORHEXIDINE GLUCONATE ORAL RINSE 1.2 MG/ML
15 SOLUTION DENTAL EVERY 12 HOURS
Status: DISPENSED | OUTPATIENT
Start: 2023-09-29 | End: 2023-09-30

## 2023-09-29 RX ORDER — EZETIMIBE 10 MG/1
TABLET ORAL
Qty: 90 TABLET | Refills: 3 | Status: SHIPPED | OUTPATIENT
Start: 2023-09-29

## 2023-09-29 RX ORDER — ATORVASTATIN CALCIUM 40 MG/1
TABLET, FILM COATED ORAL
Qty: 90 TABLET | Refills: 3 | Status: SHIPPED | OUTPATIENT
Start: 2023-09-29

## 2023-09-29 NOTE — H&P
Name: Jeremi Shankar Milagros ADMIT: (Not on file)   : 1950  PCP: Jhon Alvarez MD    MRN: [id number] LOS: 0 days   AGE/SEX: 72 y.o. male  ROOM: Room/bed info not found     CC: Aortic stenosis      Subjective   History of Present Illness  Patient is a 72 y.o. male with a history of hypertension, hyperlipidemia, and DM II. He denies tobacco, ETOH, or illicit drug use. The patient was noted to have murmur in January of this year. Echocardiogram was performed which showed normal LV function, grade 1 diastolic dysfunction, moderate to severe bicuspid aortic stenosis, and moderate dilation of the aortic root. He was referred to cardiology for further evaluation, seeing Dr. Grey on 1/10/23. At that time he recommended follow up echo in 6 months. Repeat echocardiogram on 23 now showed severe aortic valve stenosis. He is relatively asymptomatic, denying shortness of breath, dizziness, chest pain, or edema. Patient was set up for left heart catheterization which revealed single vessel CAD with 70% lesion in the proximal LAD. CT of the chest was performed to evaluate for bicuspid aortopathy. CTA on 23 showed aortic root measuring 3.9 cm and ascending aorta measuring maximally at 4.7 cm. He was referred to Dr. Mcduffie for surgical evaluation, last seeing him in the office on 8/15. At that time he recommended ascending aneurysm repair, AVR and CABG with LIMA to the LAD. I am seeing her for the first time today in [facility] where she presents for preoperative testing prior to his upcoming surgery. He denies any changes to his medical history since last being seen.     Past Medical History:   Diagnosis Date    Aortic stenosis 2023    Aortic stenosis due to bicuspid aortic valve     Arthritis 2019    arthritis is in my hands    Benign essential hypertension     Bilateral carpal tunnel syndrome 2020    Cataract     Eye doctor saw them in exam    CHF (congestive heart failure)     Colon polyp  or  2015    Diverticulosis of large intestine 2016    Description: 2015????????colonoscopy????????Dr. Francis????????diverticulosis/colon polyps (2).    Headache 1965    History of transfusion     Hyperlipidemia     Type 2 diabetes mellitus     Visual impairment     glasses for reading     Past Surgical History:   Procedure Laterality Date    CARDIAC CATHETERIZATION N/A 2023    Procedure: Right Heart Cath;  Surgeon: Kun Desai MD;  Location:  [facility];  Service: Cardiovascular;  Laterality: N/A;    CARDIAC CATHETERIZATION N/A 2023    Procedure: Coronary angiography;  Surgeon: Kun Desai MD;  Location:  [facility];  Service: Cardiovascular;  Laterality: N/A;    CARPAL TUNNEL RELEASE Left 2022    COLONOSCOPY      Dr Francis --WNL.  €“Dr. Hernandez€“colon polyps (2); diverticulosis.    COLONOSCOPY  ?    polyps--DR Francis    HERNIA REPAIR      (R) REPAIR TWICE     Family History   Problem Relation Age of Onset    Hypertension Mother         passed at 87    Heart attack Father 86    Heart disease Father     No Known Problems Sister     Asthma Brother          of covid complications    Coronary artery disease Brother     No Known Problems Brother     Diabetes Maternal Grandfather     Dementia Neg Hx     Colon cancer Neg Hx     Prostate cancer Neg Hx     Malig Hyperthermia Neg Hx      Social History     Tobacco Use    Smoking status: Never     Passive exposure: Never    Smokeless tobacco: Never    Tobacco comments:     Never Smoked   Vaping Use    Vaping Use: Never used   Substance Use Topics    Alcohol use: No    Drug use: Never     No medications prior to admission.     Allergies:  Lisinopril    Review of Systems   Constitutional:  Negative for activity change and fatigue.   HENT:  Negative for dental problem, hearing loss and trouble swallowing.    Respiratory:  Negative for chest tightness and shortness of breath.    Cardiovascular:   Negative for palpitations and leg swelling.   Gastrointestinal:  Negative for diarrhea, nausea and vomiting.   Endocrine: Negative for polydipsia, polyphagia and polyuria.   Genitourinary:  Negative for difficulty urinating, frequency and urgency.   Musculoskeletal:  Negative for arthralgias, gait problem and myalgias.   Skin:  Negative for rash and wound.   Allergic/Immunologic: Negative.    Neurological:  Negative for dizziness, syncope, numbness and headaches.   Hematological: Negative.    Psychiatric/Behavioral: Negative.        Objective    Vital Signs  Temp:  [97.6 °F (36.4 °C)] 97.6 °F (36.4 °C)  Heart Rate:  [62] 62  Resp:  [16] 16  BP: (128)/(75) 128/75  SpO2:  [98 %] 98 %  on   ;   Device (Oxygen Therapy): room air  There is no height or weight on file to calculate BMI.    Physical Exam  Vitals and nursing note reviewed.   Constitutional:       Appearance: Normal appearance.   HENT:      Head: Normocephalic.      Nose: Nose normal. No congestion or rhinorrhea.      Mouth/Throat:      Mouth: Mucous membranes are moist.      Pharynx: Oropharynx is clear.   Cardiovascular:      Rate and Rhythm: Normal rate and regular rhythm.      Pulses: Normal pulses.      Heart sounds: Murmur heard.   Pulmonary:      Effort: Pulmonary effort is normal.      Breath sounds: Normal breath sounds.   Abdominal:      General: Bowel sounds are normal.      Palpations: Abdomen is soft.   Musculoskeletal:         General: Normal range of motion.      Cervical back: Normal range of motion and neck supple.      Right lower leg: No edema.      Left lower leg: No edema.   Skin:     General: Skin is warm and dry.      Capillary Refill: Capillary refill takes less than 2 seconds.   Neurological:      General: No focal deficit present.      Mental Status: He is alert and oriented to person, place, and time. Mental status is at baseline.   Psychiatric:         Mood and Affect: Mood normal.         Behavior: Behavior normal.          Thought Content: Thought content normal.         Judgment: Judgment normal.       Results Review:  I reviewed the patient's new clinical results. Lab work review. UA negative. COVID-19 screening negative. CXR without evidence of acute disease. EKG showed sinus rhythm. Carotid duplex with <50% stenosis bilaterally. Vein mapping showed inadequate saphenous vein bilaterally. Will discuss with Dr. Mcduffie, hopefully only the LIMA will be necessary.     Assessment & Plan  - severe bicuspid aortic valve stenosis  - ascending aortic aneurysm--4.7 cm per CTA  - severe single vessel CAD--70% LAD  - hypertension  - hyperlipidemia--statin therapy  - DM II--A1c 6.1    Plan for surgery on Tuesday with Dr. Mcduffie  Reiterated to patient not to take home medications the morning of surgery  Questions answered with verbalized understanding    I discussed the patients findings and my recommendations with patient and family.    Rosa M Roy, CLEO  09/29/23  11:43 EDT

## 2023-09-29 NOTE — DISCHARGE INSTRUCTIONS
Take the following medications the morning of surgery with a small sip of water: NONE    ARRIVAL TIME : 500AM      General Instructions:  Do not eat or drink anything after midnight the night before surgery.  Patients who avoid smoking, chewing tobacco and alcohol for 4 weeks prior to surgery have a reduced risk of post-operative complications.  Quit smoking as many days before surgery as you can.  Do not smoke, use chewing tobacco or drink alcohol the day of surgery.   If applicable bring your C-PAP/ BI-PAP machine in with you to preop day of surgery.  Bring any papers given to you in the doctor’s office.  Wear clean comfortable clothes.  Do not wear contact lenses, false eyelashes or make-up.  Bring a case for your glasses.   Bring crutches or walker if applicable.  Remove all piercings.  Leave jewelry and any other valuables at home.  Hair extensions with metal clips must be removed prior to surgery.  The Pre-Admission Testing nurse will instruct you to bring medications if unable to obtain an accurate list in Pre-Admission Testing.        ******If you were given a blood bank ID arm band remember to bring it with you the day of surgery.    Preventing a Surgical Site Infection:  For 2 to 3 days before surgery, avoid shaving with a razor because the razor can irritate skin and make it easier to develop an infection.    Any areas of open skin can increase the risk of a post-operative wound infection by allowing bacteria to enter and travel throughout the body.  Notify your surgeon if you have any skin wounds / rashes even if it is not near the expected surgical site.  The area will need assessed to determine if surgery should be delayed until it is healed.  The night prior to surgery shower using a fresh bar of anti-bacterial soap (such as Dial) and clean washcloth.  Sleep in a clean bed with clean clothing.  Do not allow pets to sleep with you.  Shower on the morning of surgery using a fresh bar of anti-bacterial  soap (such as Dial) and clean washcloth.  Dry with a clean towel and dress in clean clothing.  Ask your surgeon if you will be receiving antibiotics prior to surgery.  Make sure you, your family, and all healthcare providers clean their hands with soap and water or an alcohol based hand  before caring for you or your wound.    Day of surgery:  Your arrival time is approximately two hours before your scheduled surgery time.  Upon arrival, a Pre-op nurse and Anesthesiologist will review your health history, obtain vital signs, and answer questions you may have.  The only belongings needed at this time will be your home medications and if applicable your C-PAP/BI-PAP machine.  A Pre-op nurse will start an IV and you may receive medication in preparation for surgery, including something to help you relax.      Please be aware that surgery does come with discomfort.  We want to make every effort to control your discomfort so please discuss any uncontrolled symptoms with your nurse.   Your doctor will most likely have prescribed pain medications.      If you are going home after surgery you will receive individualized written care instructions before being discharged.  A responsible adult must drive you to and from the hospital on the day of your surgery and stay with you for 24 hours.  Discharge prescriptions can be filled by the hospital pharmacy during regular pharmacy hours.  If you are having surgery late in the day/evening your prescription may be e-prescribed to your pharmacy.  Please verify your pharmacy hours or chose a 24 hour pharmacy to avoid not having access to your prescription because your pharmacy has closed for the day.    If you are staying overnight following surgery, you will be transported to your hospital room following the recovery period.  [facility] has all private rooms.    If you have any questions please call Pre-Admission Testing at [phone].  Deductibles and  co-payments are collected on the day of service. Please be prepared to pay the required co-pay, deductible or deposit on the day of service as defined by your plan.    Call your surgeon immediately if you experience any of the following symptoms:  Sore Throat  Shortness of Breath or difficulty breathing  Cough  Chills  Body soreness or muscle pain  Headache  Fever  New loss of taste or smell  Do not arrive for your surgery ill.  Your procedure will need to be rescheduled to another time.  You will need to call your physician before the day of surgery to avoid any unnecessary exposure to hospital staff as well as other patients.      BACTROBAN NASAL OINTMENT  There are many germs normally in your nose. Bactroban is an ointment that will help reduce these germs. Please follow these instructions for Bactroban use:      ____The day before surgery in the morning  Date________    ____The day before surgery in the evening              Date________    ____The day of surgery in the morning    Date________    **Squirt ½ package of Bactroban Ointment onto a cotton applicator and apply to inside of 1st nostril.  Squirt the remaining Bactroban and apply to the inside of the other nostril.    PERIDEX- ORAL:  Use only if your surgeon has ordered  Use the night before and morning of surgery - Swish, gargle, and spit - do not swallow.      CHLORHEXIDINE CLOTH INSTRUCTIONS  The morning of surgery follow these instructions using the Chlorhexidine cloths you've been given.  These steps reduce bacteria on the body.  Do not use the cloths near your eyes, ears mouth, genitalia or on open wounds.  Throw the cloths away after use but do not try to flush them down a toilet.      Open and remove one cloth at a time from the package.    Leave the cloth unfolded and begin the bathing.  Massage the skin with the cloths using gentle pressure to remove bacteria.  Do not scrub harshly.   Follow the steps below with one 2% CHG cloth per area (6  total cloths).  One cloth for neck, shoulders and chest.  One cloth for both arms, hands, fingers and underarms (do underarms last).  One cloth for the abdomen followed by groin.  One cloth for right leg and foot including between the toes.  One cloth for left leg and foot including between the toes.  The last cloth is to be used for the back of the neck, back and buttocks.    Allow the CHG to air dry 3 minutes on the skin which will give it time to work and decrease the chance of irritation.  The skin may feel sticky until it is dry.  Do not rinse with water or any other liquid or you will lose the beneficial effects of the CHG.  If mild skin irritation occurs, do rinse the skin to remove the CHG.  Report this to the nurse at time of admission.  Do not apply lotions, creams, ointments, deodorants or perfumes after using the clothes. Dress in clean clothes before coming to the hospital.

## 2023-09-29 NOTE — ANESTHESIA PREPROCEDURE EVALUATION
Anesthesia Evaluation     Patient summary reviewed and Nursing notes reviewed                Airway   Mallampati: II  TM distance: >3 FB  Neck ROM: full  No difficulty expected  Dental    (+) upper dentures, lower dentures and edentulous    Pulmonary - normal exam    breath sounds clear to auscultation  Cardiovascular     ECG reviewed  Rhythm: regular  Rate: normal    (+) hypertension 2 medications or greater, valvular problems/murmurs AS, CHF , murmur, hyperlipidemia    ROS comment: Severe AS, bicuspid valve, dilated aortic root 4.5cm, EF 62% by ECHO 7/23  PE comment: ARDEN at LSB    Neuro/Psych  (+) headaches, numbness    ROS Comment: Bilateral CTS  GI/Hepatic/Renal/Endo    (+) diabetes mellitus type 2    Musculoskeletal     Abdominal  - normal exam   Substance History      OB/GYN          Other   arthritis,                     Anesthesia Plan    ASA 4     general     (Art line/CVC/SGC/RAYRAY/neuro-monitoring/post-op vent)  intravenous induction   Postoperative Plan: Expected vent after surgery  Anesthetic plan, risks, benefits, and alternatives have been provided, discussed and informed consent has been obtained with: spouse/significant other and patient.    CODE STATUS:

## 2023-10-02 RX ORDER — ACETAMINOPHEN 500 MG
1000 TABLET ORAL
Status: DISCONTINUED | OUTPATIENT
Start: 2023-10-03 | End: 2023-10-03

## 2023-10-03 ENCOUNTER — HOSPITAL ENCOUNTER (INPATIENT)
Facility: HOSPITAL | Age: 73
LOS: 4 days | Discharge: HOME HEALTH | DRG: 221 | End: 2023-10-07
Attending: THORACIC SURGERY (CARDIOTHORACIC VASCULAR SURGERY) | Admitting: THORACIC SURGERY (CARDIOTHORACIC VASCULAR SURGERY)
Payer: MEDICARE

## 2023-10-03 ENCOUNTER — APPOINTMENT (OUTPATIENT)
Dept: GENERAL RADIOLOGY | Facility: HOSPITAL | Age: 73
DRG: 221 | End: 2023-10-03
Payer: MEDICARE

## 2023-10-03 ENCOUNTER — ANESTHESIA (OUTPATIENT)
Dept: PERIOP | Facility: HOSPITAL | Age: 73
DRG: 221 | End: 2023-10-03
Payer: MEDICARE

## 2023-10-03 ENCOUNTER — ANCILLARY PROCEDURE (OUTPATIENT)
Dept: PERIOP | Facility: HOSPITAL | Age: 73
DRG: 221 | End: 2023-10-03
Payer: MEDICARE

## 2023-10-03 DIAGNOSIS — Q23.0 AORTIC STENOSIS DUE TO BICUSPID AORTIC VALVE: ICD-10-CM

## 2023-10-03 DIAGNOSIS — R09.02 POSTOPERATIVE HYPOXIA: ICD-10-CM

## 2023-10-03 DIAGNOSIS — Q23.1 AORTIC STENOSIS DUE TO BICUSPID AORTIC VALVE: ICD-10-CM

## 2023-10-03 DIAGNOSIS — Z98.890 POSTOPERATIVE HYPOXIA: ICD-10-CM

## 2023-10-03 DIAGNOSIS — I35.0 NONRHEUMATIC AORTIC (VALVE) STENOSIS: ICD-10-CM

## 2023-10-03 DIAGNOSIS — Z95.1 S/P CABG (CORONARY ARTERY BYPASS GRAFT): Primary | ICD-10-CM

## 2023-10-03 PROBLEM — I71.21 ASCENDING AORTIC ANEURYSM: Status: ACTIVE | Noted: 2023-10-03

## 2023-10-03 LAB
A-A DO2: 449.5 MMHG
ACT BLD: 119 SECONDS (ref 82–152)
ACT BLD: 119 SECONDS (ref 82–152)
ACT BLD: 347 SECONDS (ref 82–152)
ACT BLD: 353 SECONDS (ref 82–152)
ACT BLD: 402 SECONDS (ref 82–152)
ACT BLD: 498 SECONDS (ref 82–152)
ALBUMIN SERPL-MCNC: 3.9 G/DL (ref 3.5–5.2)
ALBUMIN SERPL-MCNC: 4.3 G/DL (ref 3.5–5.2)
ANION GAP SERPL CALCULATED.3IONS-SCNC: 10.3 MMOL/L (ref 5–15)
ANION GAP SERPL CALCULATED.3IONS-SCNC: 9.9 MMOL/L (ref 5–15)
APTT PPP: 30.4 SECONDS (ref 22.7–35.4)
ARTERIAL PATENCY WRIST A: ABNORMAL
ARTERIAL PATENCY WRIST A: ABNORMAL
ATMOSPHERIC PRESS: 750.5 MMHG
ATMOSPHERIC PRESS: 754.7 MMHG
BASE EXCESS BLDA CALC-SCNC: -1 MMOL/L (ref -5–5)
BASE EXCESS BLDA CALC-SCNC: -1 MMOL/L (ref -5–5)
BASE EXCESS BLDA CALC-SCNC: -1.3 MMOL/L (ref 0–2)
BASE EXCESS BLDA CALC-SCNC: -1.7 MMOL/L (ref 0–2)
BASE EXCESS BLDA CALC-SCNC: -3 MMOL/L (ref -5–5)
BASE EXCESS BLDA CALC-SCNC: 0 MMOL/L (ref -5–5)
BASE EXCESS BLDA CALC-SCNC: 0 MMOL/L (ref -5–5)
BASE EXCESS BLDA CALC-SCNC: 1 MMOL/L (ref -5–5)
BASOPHILS # BLD AUTO: 0.02 10*3/MM3 (ref 0–0.2)
BASOPHILS NFR BLD AUTO: 0.2 % (ref 0–1.5)
BDY SITE: ABNORMAL
BDY SITE: ABNORMAL
BUN SERPL-MCNC: 18 MG/DL (ref 8–23)
BUN SERPL-MCNC: 20 MG/DL (ref 8–23)
BUN/CREAT SERPL: 16.9 (ref 7–25)
BUN/CREAT SERPL: 18.2 (ref 7–25)
CA-I BLD-MCNC: 4.8 MG/DL (ref 4.6–5.4)
CA-I BLDA-SCNC: ABNORMAL MMOL/L
CA-I SERPL ISE-MCNC: 1.2 MMOL/L (ref 1.15–1.35)
CALCIUM SPEC-SCNC: 8.5 MG/DL (ref 8.6–10.5)
CALCIUM SPEC-SCNC: 9.2 MG/DL (ref 8.6–10.5)
CHLORIDE SERPL-SCNC: 106 MMOL/L (ref 98–107)
CHLORIDE SERPL-SCNC: 108 MMOL/L (ref 98–107)
CO2 BLDA-SCNC: 24.3 MMOL/L (ref 23–27)
CO2 BLDA-SCNC: 25 MMOL/L (ref 24–29)
CO2 BLDA-SCNC: 26 MMOL/L (ref 23–27)
CO2 BLDA-SCNC: 26 MMOL/L (ref 24–29)
CO2 BLDA-SCNC: 27 MMOL/L (ref 24–29)
CO2 BLDA-SCNC: 28 MMOL/L (ref 24–29)
CO2 SERPL-SCNC: 23.1 MMOL/L (ref 22–29)
CO2 SERPL-SCNC: 23.7 MMOL/L (ref 22–29)
CREAT SERPL-MCNC: 0.99 MG/DL (ref 0.76–1.27)
CREAT SERPL-MCNC: 1.18 MG/DL (ref 0.76–1.27)
DEPRECATED RDW RBC AUTO: 44.1 FL (ref 37–54)
DEPRECATED RDW RBC AUTO: 44.3 FL (ref 37–54)
DEVICE COMMENT: ABNORMAL
EGFRCR SERPLBLD CKD-EPI 2021: 65.2 ML/MIN/1.73
EGFRCR SERPLBLD CKD-EPI 2021: 80.4 ML/MIN/1.73
EOSINOPHIL # BLD AUTO: 0.07 10*3/MM3 (ref 0–0.4)
EOSINOPHIL NFR BLD AUTO: 0.5 % (ref 0.3–6.2)
ERYTHROCYTE [DISTWIDTH] IN BLOOD BY AUTOMATED COUNT: 14.5 % (ref 12.3–15.4)
ERYTHROCYTE [DISTWIDTH] IN BLOOD BY AUTOMATED COUNT: 14.5 % (ref 12.3–15.4)
FIBRINOGEN PPP-MCNC: 275 MG/DL (ref 219–464)
GLUCOSE BLDC GLUCOMTR-MCNC: 113 MG/DL (ref 70–130)
GLUCOSE BLDC GLUCOMTR-MCNC: 113 MG/DL (ref 70–130)
GLUCOSE BLDC GLUCOMTR-MCNC: 115 MG/DL (ref 70–130)
GLUCOSE BLDC GLUCOMTR-MCNC: 117 MG/DL (ref 70–130)
GLUCOSE BLDC GLUCOMTR-MCNC: 117 MG/DL (ref 70–130)
GLUCOSE BLDC GLUCOMTR-MCNC: 119 MG/DL (ref 70–130)
GLUCOSE BLDC GLUCOMTR-MCNC: 119 MG/DL (ref 70–130)
GLUCOSE BLDC GLUCOMTR-MCNC: 121 MG/DL (ref 70–130)
GLUCOSE BLDC GLUCOMTR-MCNC: 121 MG/DL (ref 70–130)
GLUCOSE BLDC GLUCOMTR-MCNC: 123 MG/DL (ref 70–130)
GLUCOSE BLDC GLUCOMTR-MCNC: 125 MG/DL (ref 70–130)
GLUCOSE BLDC GLUCOMTR-MCNC: 127 MG/DL (ref 70–130)
GLUCOSE BLDC GLUCOMTR-MCNC: 129 MG/DL (ref 70–130)
GLUCOSE BLDC GLUCOMTR-MCNC: 130 MG/DL (ref 70–130)
GLUCOSE BLDC GLUCOMTR-MCNC: 131 MG/DL (ref 70–130)
GLUCOSE BLDC GLUCOMTR-MCNC: 157 MG/DL (ref 70–130)
GLUCOSE BLDC GLUCOMTR-MCNC: 189 MG/DL (ref 70–130)
GLUCOSE BLDC GLUCOMTR-MCNC: 191 MG/DL (ref 70–130)
GLUCOSE BLDC GLUCOMTR-MCNC: 198 MG/DL (ref 70–130)
GLUCOSE BLDC GLUCOMTR-MCNC: 208 MG/DL (ref 70–130)
GLUCOSE SERPL-MCNC: 108 MG/DL (ref 65–99)
GLUCOSE SERPL-MCNC: 122 MG/DL (ref 65–99)
HCO3 BLDA-SCNC: 23.2 MMOL/L (ref 22–28)
HCO3 BLDA-SCNC: 23.6 MMOL/L (ref 22–26)
HCO3 BLDA-SCNC: 24.6 MMOL/L (ref 22–28)
HCO3 BLDA-SCNC: 24.9 MMOL/L (ref 22–26)
HCO3 BLDA-SCNC: 25.2 MMOL/L (ref 22–26)
HCO3 BLDA-SCNC: 25.5 MMOL/L (ref 22–26)
HCO3 BLDA-SCNC: 25.8 MMOL/L (ref 22–26)
HCO3 BLDA-SCNC: 26.6 MMOL/L (ref 22–26)
HCT VFR BLD AUTO: 35.9 % (ref 37.5–51)
HCT VFR BLD AUTO: 38 % (ref 37.5–51)
HCT VFR BLDA CALC: 26 % (ref 38–51)
HCT VFR BLDA CALC: 27 % (ref 38–51)
HCT VFR BLDA CALC: 28 % (ref 38–51)
HCT VFR BLDA CALC: 29 % (ref 38–51)
HCT VFR BLDA CALC: 31 % (ref 38–51)
HCT VFR BLDA CALC: 38 % (ref 38–51)
HEMODILUTION: NO
HEMODILUTION: NO
HGB BLD-MCNC: 11.8 G/DL (ref 13–17.7)
HGB BLD-MCNC: 12.5 G/DL (ref 13–17.7)
HGB BLDA-MCNC: 10.5 G/DL (ref 12–17)
HGB BLDA-MCNC: 12.9 G/DL (ref 12–17)
HGB BLDA-MCNC: 8.8 G/DL (ref 12–17)
HGB BLDA-MCNC: 9.2 G/DL (ref 12–17)
HGB BLDA-MCNC: 9.5 G/DL (ref 12–17)
HGB BLDA-MCNC: 9.9 G/DL (ref 12–17)
IMM GRANULOCYTES # BLD AUTO: 0.07 10*3/MM3 (ref 0–0.05)
IMM GRANULOCYTES NFR BLD AUTO: 0.5 % (ref 0–0.5)
INHALED O2 CONCENTRATION: 100 %
INHALED O2 CONCENTRATION: 40 %
INR PPP: 1.29 (ref 0.9–1.1)
LYMPHOCYTES # BLD AUTO: 1.07 10*3/MM3 (ref 0.7–3.1)
LYMPHOCYTES NFR BLD AUTO: 8.2 % (ref 19.6–45.3)
MAGNESIUM SERPL-MCNC: 2.4 MG/DL (ref 1.6–2.4)
MAGNESIUM SERPL-MCNC: 3.1 MG/DL (ref 1.6–2.4)
MCH RBC QN AUTO: 27.3 PG (ref 26.6–33)
MCH RBC QN AUTO: 27.7 PG (ref 26.6–33)
MCHC RBC AUTO-ENTMCNC: 32.9 G/DL (ref 31.5–35.7)
MCHC RBC AUTO-ENTMCNC: 32.9 G/DL (ref 31.5–35.7)
MCV RBC AUTO: 83.1 FL (ref 79–97)
MCV RBC AUTO: 84.1 FL (ref 79–97)
MODALITY: ABNORMAL
MODALITY: ABNORMAL
MONOCYTES # BLD AUTO: 0.87 10*3/MM3 (ref 0.1–0.9)
MONOCYTES NFR BLD AUTO: 6.6 % (ref 5–12)
NEUTROPHILS NFR BLD AUTO: 11.02 10*3/MM3 (ref 1.7–7)
NEUTROPHILS NFR BLD AUTO: 84 % (ref 42.7–76)
NRBC BLD AUTO-RTO: 0 /100 WBC (ref 0–0.2)
O2 A-A PPRESDIFF RESPIRATORY: 0.3 MMHG
O2 A-A PPRESDIFF RESPIRATORY: 0.4 MMHG
PCO2 BLDA: 37.3 MM HG (ref 35–45)
PCO2 BLDA: 44.4 MM HG (ref 35–45)
PCO2 BLDA: 44.6 MM HG (ref 35–45)
PCO2 BLDA: 46.8 MM HG (ref 35–45)
PCO2 BLDA: 47.1 MM HG (ref 35–45)
PCO2 BLDA: 49.8 MM HG (ref 35–45)
PCO2 BLDA: 51.5 MM HG (ref 35–45)
PCO2 BLDA: 54.8 MM HG (ref 35–45)
PEEP RESPIRATORY: 8 CM[H2O]
PEEP RESPIRATORY: 8 CM[H2O]
PH BLDA: 7.28 PH UNITS (ref 7.35–7.6)
PH BLDA: 7.33 PH UNITS (ref 7.35–7.45)
PH BLDA: 7.34 PH UNITS (ref 7.35–7.6)
PH BLDA: 7.34 PH UNITS (ref 7.35–7.6)
PH BLDA: 7.35 PH UNITS (ref 7.35–7.6)
PH BLDA: 7.37 PH UNITS (ref 7.35–7.6)
PH BLDA: 7.39 PH UNITS (ref 7.35–7.6)
PH BLDA: 7.4 PH UNITS (ref 7.35–7.45)
PHOSPHATE SERPL-MCNC: 2 MG/DL (ref 2.5–4.5)
PHOSPHATE SERPL-MCNC: 2.8 MG/DL (ref 2.5–4.5)
PLATELET # BLD AUTO: 117 10*3/MM3 (ref 140–450)
PLATELET # BLD AUTO: 131 10*3/MM3 (ref 140–450)
PMV BLD AUTO: 9.3 FL (ref 6–12)
PMV BLD AUTO: 9.4 FL (ref 6–12)
PO2 BLDA: 220.8 MM HG (ref 80–100)
PO2 BLDA: 42 MMHG (ref 80–105)
PO2 BLDA: 443 MMHG (ref 80–105)
PO2 BLDA: 447 MMHG (ref 80–105)
PO2 BLDA: 46 MMHG (ref 80–105)
PO2 BLDA: 493 MMHG (ref 80–105)
PO2 BLDA: 494 MMHG (ref 80–105)
PO2 BLDA: 93.2 MM HG (ref 80–100)
POTASSIUM BLDA-SCNC: 4 MMOL/L (ref 3.5–4.9)
POTASSIUM BLDA-SCNC: 4.1 MMOL/L (ref 3.5–4.9)
POTASSIUM BLDA-SCNC: 4.5 MMOL/L (ref 3.5–4.9)
POTASSIUM BLDA-SCNC: 4.6 MMOL/L (ref 3.5–4.9)
POTASSIUM SERPL-SCNC: 4.1 MMOL/L (ref 3.5–5.2)
POTASSIUM SERPL-SCNC: 4.2 MMOL/L (ref 3.5–5.2)
PROTHROMBIN TIME: 16.2 SECONDS (ref 11.7–14.2)
PSV: 8 CMH2O
QT INTERVAL: 397 MS
QTC INTERVAL: 444 MS
RBC # BLD AUTO: 4.32 10*6/MM3 (ref 4.14–5.8)
RBC # BLD AUTO: 4.52 10*6/MM3 (ref 4.14–5.8)
SAO2 % BLDA: 100 % (ref 95–98)
SAO2 % BLDA: 75 % (ref 95–98)
SAO2 % BLDA: 77 % (ref 95–98)
SAO2 % BLDCOA: 96.5 % (ref 92–98.5)
SAO2 % BLDCOA: 99.8 % (ref 92–98.5)
SET MECH RESP RATE: 16
SODIUM SERPL-SCNC: 139 MMOL/L (ref 136–145)
SODIUM SERPL-SCNC: 142 MMOL/L (ref 136–145)
TOTAL RATE: 16 BREATHS/MINUTE
TOTAL RATE: 17 BREATHS/MINUTE
VENTILATOR MODE: ABNORMAL
VENTILATOR MODE: AC
VT ON VENT VENT: 600 ML
WBC NRBC COR # BLD: 13.12 10*3/MM3 (ref 3.4–10.8)
WBC NRBC COR # BLD: 9.8 10*3/MM3 (ref 3.4–10.8)

## 2023-10-03 PROCEDURE — 88305 TISSUE EXAM BY PATHOLOGIST: CPT | Performed by: THORACIC SURGERY (CARDIOTHORACIC VASCULAR SURGERY)

## 2023-10-03 PROCEDURE — 02RF08Z REPLACEMENT OF AORTIC VALVE WITH ZOOPLASTIC TISSUE, OPEN APPROACH: ICD-10-PCS | Performed by: THORACIC SURGERY (CARDIOTHORACIC VASCULAR SURGERY)

## 2023-10-03 PROCEDURE — C1729 CATH, DRAINAGE: HCPCS | Performed by: THORACIC SURGERY (CARDIOTHORACIC VASCULAR SURGERY)

## 2023-10-03 PROCEDURE — C1751 CATH, INF, PER/CENT/MIDLINE: HCPCS | Performed by: ANESTHESIOLOGY

## 2023-10-03 PROCEDURE — 25010000002 PROPOFOL 10 MG/ML EMULSION: Performed by: NURSE PRACTITIONER

## 2023-10-03 PROCEDURE — 33405 REPLACEMENT AORTIC VALVE OPN: CPT | Performed by: PHYSICIAN ASSISTANT

## 2023-10-03 PROCEDURE — 25010000002 MIDAZOLAM PER 1 MG: Performed by: ANESTHESIOLOGY

## 2023-10-03 PROCEDURE — C1713 ANCHOR/SCREW BN/BN,TIS/BN: HCPCS | Performed by: THORACIC SURGERY (CARDIOTHORACIC VASCULAR SURGERY)

## 2023-10-03 PROCEDURE — 25810000003 SODIUM CHLORIDE 0.9 % SOLUTION

## 2023-10-03 PROCEDURE — 25010000002 PHENYLEPHRINE 10 MG/ML SOLUTION 5 ML VIAL: Performed by: ANESTHESIOLOGY

## 2023-10-03 PROCEDURE — 25010000002 METOCLOPRAMIDE PER 10 MG: Performed by: NURSE PRACTITIONER

## 2023-10-03 PROCEDURE — 94799 UNLISTED PULMONARY SVC/PX: CPT

## 2023-10-03 PROCEDURE — 25010000002 MAGNESIUM SULFATE PER 500 MG OF MAGNESIUM: Performed by: ANESTHESIOLOGY

## 2023-10-03 PROCEDURE — P9047 ALBUMIN (HUMAN), 25%, 50ML: HCPCS

## 2023-10-03 PROCEDURE — 02100Z9 BYPASS CORONARY ARTERY, ONE ARTERY FROM LEFT INTERNAL MAMMARY, OPEN APPROACH: ICD-10-PCS | Performed by: THORACIC SURGERY (CARDIOTHORACIC VASCULAR SURGERY)

## 2023-10-03 PROCEDURE — 5A1221Z PERFORMANCE OF CARDIAC OUTPUT, CONTINUOUS: ICD-10-PCS | Performed by: THORACIC SURGERY (CARDIOTHORACIC VASCULAR SURGERY)

## 2023-10-03 PROCEDURE — 25010000002 CEFAZOLIN IN DEXTROSE 2-4 GM/100ML-% SOLUTION: Performed by: NURSE PRACTITIONER

## 2023-10-03 PROCEDURE — 25010000002 PAPAVERINE PER 60 MG: Performed by: THORACIC SURGERY (CARDIOTHORACIC VASCULAR SURGERY)

## 2023-10-03 PROCEDURE — 85384 FIBRINOGEN ACTIVITY: CPT | Performed by: NURSE PRACTITIONER

## 2023-10-03 PROCEDURE — 82948 REAGENT STRIP/BLOOD GLUCOSE: CPT

## 2023-10-03 PROCEDURE — P9041 ALBUMIN (HUMAN),5%, 50ML: HCPCS | Performed by: NURSE PRACTITIONER

## 2023-10-03 PROCEDURE — 85610 PROTHROMBIN TIME: CPT | Performed by: NURSE PRACTITIONER

## 2023-10-03 PROCEDURE — 25010000002 ACETAMINOPHEN 10 MG/ML SOLUTION: Performed by: NURSE PRACTITIONER

## 2023-10-03 PROCEDURE — 94760 N-INVAS EAR/PLS OXIMETRY 1: CPT

## 2023-10-03 PROCEDURE — 25010000002 HEPARIN (PORCINE) PER 1000 UNITS

## 2023-10-03 PROCEDURE — 25010000002 ALBUMIN HUMAN 5% PER 50 ML: Performed by: NURSE PRACTITIONER

## 2023-10-03 PROCEDURE — 82803 BLOOD GASES ANY COMBINATION: CPT

## 2023-10-03 PROCEDURE — 94002 VENT MGMT INPAT INIT DAY: CPT

## 2023-10-03 PROCEDURE — 33533 CABG ARTERIAL SINGLE: CPT | Performed by: THORACIC SURGERY (CARDIOTHORACIC VASCULAR SURGERY)

## 2023-10-03 PROCEDURE — 94761 N-INVAS EAR/PLS OXIMETRY MLT: CPT

## 2023-10-03 PROCEDURE — 25010000002 ALBUMIN HUMAN 25% PER 50 ML

## 2023-10-03 PROCEDURE — 25010000002 PROPOFOL 10 MG/ML EMULSION: Performed by: ANESTHESIOLOGY

## 2023-10-03 PROCEDURE — 63710000001 INSULIN REGULAR HUMAN PER 5 UNITS: Performed by: ANESTHESIOLOGY

## 2023-10-03 PROCEDURE — 80069 RENAL FUNCTION PANEL: CPT | Performed by: NURSE PRACTITIONER

## 2023-10-03 PROCEDURE — 85018 HEMOGLOBIN: CPT

## 2023-10-03 PROCEDURE — 25010000002 CEFAZOLIN IN DEXTROSE 2000 MG/ 100 ML SOLUTION: Performed by: NURSE PRACTITIONER

## 2023-10-03 PROCEDURE — 25010000002 HEPARIN (PORCINE) PER 1000 UNITS: Performed by: ANESTHESIOLOGY

## 2023-10-03 PROCEDURE — 85025 COMPLETE CBC W/AUTO DIFF WBC: CPT | Performed by: NURSE PRACTITIONER

## 2023-10-03 PROCEDURE — 82330 ASSAY OF CALCIUM: CPT | Performed by: NURSE PRACTITIONER

## 2023-10-03 PROCEDURE — 85027 COMPLETE CBC AUTOMATED: CPT | Performed by: NURSE PRACTITIONER

## 2023-10-03 PROCEDURE — 82947 ASSAY GLUCOSE BLOOD QUANT: CPT

## 2023-10-03 PROCEDURE — 33405 REPLACEMENT AORTIC VALVE OPN: CPT | Performed by: THORACIC SURGERY (CARDIOTHORACIC VASCULAR SURGERY)

## 2023-10-03 PROCEDURE — A4648 IMPLANTABLE TISSUE MARKER: HCPCS | Performed by: THORACIC SURGERY (CARDIOTHORACIC VASCULAR SURGERY)

## 2023-10-03 PROCEDURE — 85347 COAGULATION TIME ACTIVATED: CPT

## 2023-10-03 PROCEDURE — 25010000002 CALCIUM GLUCONATE 2-0.675 GM/100ML-% SOLUTION

## 2023-10-03 PROCEDURE — 25010000002 MAGNESIUM SULFATE 2 GM/50ML SOLUTION: Performed by: NURSE PRACTITIONER

## 2023-10-03 PROCEDURE — 33533 CABG ARTERIAL SINGLE: CPT | Performed by: PHYSICIAN ASSISTANT

## 2023-10-03 PROCEDURE — 93010 ELECTROCARDIOGRAM REPORT: CPT | Performed by: INTERNAL MEDICINE

## 2023-10-03 PROCEDURE — 25010000002 MORPHINE PER 10 MG: Performed by: NURSE PRACTITIONER

## 2023-10-03 PROCEDURE — 83735 ASSAY OF MAGNESIUM: CPT | Performed by: NURSE PRACTITIONER

## 2023-10-03 PROCEDURE — 71045 X-RAY EXAM CHEST 1 VIEW: CPT

## 2023-10-03 PROCEDURE — 85730 THROMBOPLASTIN TIME PARTIAL: CPT | Performed by: NURSE PRACTITIONER

## 2023-10-03 PROCEDURE — 93318 ECHO TRANSESOPHAGEAL INTRAOP: CPT

## 2023-10-03 PROCEDURE — B24BZZ4 ULTRASONOGRAPHY OF HEART WITH AORTA, TRANSESOPHAGEAL: ICD-10-PCS | Performed by: THORACIC SURGERY (CARDIOTHORACIC VASCULAR SURGERY)

## 2023-10-03 PROCEDURE — 85014 HEMATOCRIT: CPT

## 2023-10-03 PROCEDURE — 93005 ELECTROCARDIOGRAM TRACING: CPT | Performed by: NURSE PRACTITIONER

## 2023-10-03 PROCEDURE — 25810000003 SODIUM CHLORIDE 0.9 % SOLUTION 250 ML FLEX CONT: Performed by: ANESTHESIOLOGY

## 2023-10-03 PROCEDURE — 88311 DECALCIFY TISSUE: CPT | Performed by: THORACIC SURGERY (CARDIOTHORACIC VASCULAR SURGERY)

## 2023-10-03 PROCEDURE — 25010000002 PROTAMINE SULFATE PER 10 MG: Performed by: ANESTHESIOLOGY

## 2023-10-03 DEVICE — VLV PERICARD PERIMOUNT MAGNA EASE 27: Type: IMPLANTABLE DEVICE | Site: HEART | Status: FUNCTIONAL

## 2023-10-03 DEVICE — SS SUTURE, 4 PER SLEEVE
Type: IMPLANTABLE DEVICE | Site: STERNUM | Status: FUNCTIONAL
Brand: MYO/WIRE II

## 2023-10-03 DEVICE — SS SUTURE, 3 PER SLEEVE
Type: IMPLANTABLE DEVICE | Site: STERNUM | Status: FUNCTIONAL
Brand: MYO/WIRE II

## 2023-10-03 RX ORDER — SODIUM CHLORIDE 9 MG/ML
INJECTION, SOLUTION INTRAVENOUS CONTINUOUS PRN
Status: DISCONTINUED | OUTPATIENT
Start: 2023-10-03 | End: 2023-10-03 | Stop reason: SURG

## 2023-10-03 RX ORDER — ALUMINA, MAGNESIA, AND SIMETHICONE 2400; 2400; 240 MG/30ML; MG/30ML; MG/30ML
15 SUSPENSION ORAL EVERY 6 HOURS PRN
Status: DISCONTINUED | OUTPATIENT
Start: 2023-10-03 | End: 2023-10-07 | Stop reason: HOSPADM

## 2023-10-03 RX ORDER — PANTOPRAZOLE SODIUM 40 MG/10ML
40 INJECTION, POWDER, LYOPHILIZED, FOR SOLUTION INTRAVENOUS ONCE
Status: COMPLETED | OUTPATIENT
Start: 2023-10-03 | End: 2023-10-03

## 2023-10-03 RX ORDER — ASPIRIN 81 MG/1
81 TABLET ORAL DAILY
Status: DISCONTINUED | OUTPATIENT
Start: 2023-10-04 | End: 2023-10-07 | Stop reason: HOSPADM

## 2023-10-03 RX ORDER — FENTANYL CITRATE 50 UG/ML
50 INJECTION, SOLUTION INTRAMUSCULAR; INTRAVENOUS ONCE AS NEEDED
Status: DISCONTINUED | OUTPATIENT
Start: 2023-10-03 | End: 2023-10-03 | Stop reason: HOSPADM

## 2023-10-03 RX ORDER — MAGNESIUM HYDROXIDE 1200 MG/15ML
LIQUID ORAL AS NEEDED
Status: DISCONTINUED | OUTPATIENT
Start: 2023-10-03 | End: 2023-10-03 | Stop reason: HOSPADM

## 2023-10-03 RX ORDER — LIDOCAINE HYDROCHLORIDE 10 MG/ML
0.5 INJECTION, SOLUTION INFILTRATION; PERINEURAL ONCE AS NEEDED
Status: DISCONTINUED | OUTPATIENT
Start: 2023-10-03 | End: 2023-10-03 | Stop reason: HOSPADM

## 2023-10-03 RX ORDER — DEXTROSE MONOHYDRATE 25 G/50ML
10-50 INJECTION, SOLUTION INTRAVENOUS
Status: DISCONTINUED | OUTPATIENT
Start: 2023-10-03 | End: 2023-10-04

## 2023-10-03 RX ORDER — ACETAMINOPHEN 325 MG/1
650 TABLET ORAL EVERY 4 HOURS PRN
Status: DISCONTINUED | OUTPATIENT
Start: 2023-10-04 | End: 2023-10-07 | Stop reason: HOSPADM

## 2023-10-03 RX ORDER — POLYETHYLENE GLYCOL 3350 17 G/17G
17 POWDER, FOR SOLUTION ORAL DAILY PRN
Status: DISCONTINUED | OUTPATIENT
Start: 2023-10-03 | End: 2023-10-07 | Stop reason: HOSPADM

## 2023-10-03 RX ORDER — CYCLOBENZAPRINE HCL 10 MG
10 TABLET ORAL EVERY 8 HOURS PRN
Status: DISCONTINUED | OUTPATIENT
Start: 2023-10-04 | End: 2023-10-07 | Stop reason: HOSPADM

## 2023-10-03 RX ORDER — PHENYLEPHRINE HCL IN 0.9% NACL 0.5 MG/5ML
.2-2 SYRINGE (ML) INTRAVENOUS CONTINUOUS PRN
Status: DISCONTINUED | OUTPATIENT
Start: 2023-10-03 | End: 2023-10-04

## 2023-10-03 RX ORDER — NITROGLYCERIN 20 MG/100ML
5-200 INJECTION INTRAVENOUS
Status: DISCONTINUED | OUTPATIENT
Start: 2023-10-03 | End: 2023-10-04

## 2023-10-03 RX ORDER — MAGNESIUM SULFATE HEPTAHYDRATE 40 MG/ML
2 INJECTION, SOLUTION INTRAVENOUS EVERY 8 HOURS
Status: DISPENSED | OUTPATIENT
Start: 2023-10-03 | End: 2023-10-04

## 2023-10-03 RX ORDER — MORPHINE SULFATE 2 MG/ML
4 INJECTION, SOLUTION INTRAMUSCULAR; INTRAVENOUS
Status: DISCONTINUED | OUTPATIENT
Start: 2023-10-03 | End: 2023-10-04

## 2023-10-03 RX ORDER — ALBUMIN, HUMAN INJ 5% 5 %
1500 SOLUTION INTRAVENOUS AS NEEDED
Status: DISPENSED | OUTPATIENT
Start: 2023-10-03 | End: 2023-10-04

## 2023-10-03 RX ORDER — ACETAMINOPHEN 160 MG/5ML
650 SOLUTION ORAL EVERY 4 HOURS PRN
Status: DISCONTINUED | OUTPATIENT
Start: 2023-10-04 | End: 2023-10-07 | Stop reason: HOSPADM

## 2023-10-03 RX ORDER — SODIUM CHLORIDE 0.9 % (FLUSH) 0.9 %
3-10 SYRINGE (ML) INJECTION AS NEEDED
Status: DISCONTINUED | OUTPATIENT
Start: 2023-10-03 | End: 2023-10-03 | Stop reason: HOSPADM

## 2023-10-03 RX ORDER — CHLORHEXIDINE GLUCONATE ORAL RINSE 1.2 MG/ML
15 SOLUTION DENTAL ONCE
Status: COMPLETED | OUTPATIENT
Start: 2023-10-03 | End: 2023-10-03

## 2023-10-03 RX ORDER — MIDAZOLAM HYDROCHLORIDE 1 MG/ML
2 INJECTION INTRAMUSCULAR; INTRAVENOUS
Status: DISCONTINUED | OUTPATIENT
Start: 2023-10-03 | End: 2023-10-04

## 2023-10-03 RX ORDER — NALOXONE HCL 0.4 MG/ML
0.4 VIAL (ML) INJECTION
Status: DISCONTINUED | OUTPATIENT
Start: 2023-10-03 | End: 2023-10-07 | Stop reason: HOSPADM

## 2023-10-03 RX ORDER — ACETAMINOPHEN 650 MG/1
650 SUPPOSITORY RECTAL EVERY 4 HOURS
Status: ACTIVE | OUTPATIENT
Start: 2023-10-03 | End: 2023-10-04

## 2023-10-03 RX ORDER — MORPHINE SULFATE 2 MG/ML
1 INJECTION, SOLUTION INTRAMUSCULAR; INTRAVENOUS EVERY 4 HOURS PRN
Status: DISCONTINUED | OUTPATIENT
Start: 2023-10-03 | End: 2023-10-07 | Stop reason: HOSPADM

## 2023-10-03 RX ORDER — ACETAMINOPHEN 500 MG
1000 TABLET ORAL
Status: COMPLETED | OUTPATIENT
Start: 2023-10-03 | End: 2023-10-03

## 2023-10-03 RX ORDER — PAPAVERINE HYDROCHLORIDE 30 MG/ML
INJECTION INTRAMUSCULAR; INTRAVENOUS AS NEEDED
Status: DISCONTINUED | OUTPATIENT
Start: 2023-10-03 | End: 2023-10-03 | Stop reason: HOSPADM

## 2023-10-03 RX ORDER — CALCIUM GLUCONATE 20 MG/ML
2000 INJECTION, SOLUTION INTRAVENOUS ONCE
Status: COMPLETED | OUTPATIENT
Start: 2023-10-03 | End: 2023-10-03

## 2023-10-03 RX ORDER — ACETAMINOPHEN 160 MG/5ML
650 SOLUTION ORAL EVERY 4 HOURS
Status: ACTIVE | OUTPATIENT
Start: 2023-10-03 | End: 2023-10-04

## 2023-10-03 RX ORDER — MILRINONE LACTATE 0.2 MG/ML
.25-.375 INJECTION, SOLUTION INTRAVENOUS CONTINUOUS PRN
Status: DISCONTINUED | OUTPATIENT
Start: 2023-10-03 | End: 2023-10-04

## 2023-10-03 RX ORDER — LIDOCAINE HYDROCHLORIDE 20 MG/ML
INJECTION, SOLUTION INFILTRATION; PERINEURAL AS NEEDED
Status: DISCONTINUED | OUTPATIENT
Start: 2023-10-03 | End: 2023-10-03 | Stop reason: SURG

## 2023-10-03 RX ORDER — BISACODYL 5 MG/1
10 TABLET, DELAYED RELEASE ORAL DAILY PRN
Status: DISCONTINUED | OUTPATIENT
Start: 2023-10-03 | End: 2023-10-07 | Stop reason: HOSPADM

## 2023-10-03 RX ORDER — PANTOPRAZOLE SODIUM 40 MG/1
40 TABLET, DELAYED RELEASE ORAL EVERY MORNING
Status: DISCONTINUED | OUTPATIENT
Start: 2023-10-04 | End: 2023-10-07 | Stop reason: HOSPADM

## 2023-10-03 RX ORDER — PROPOFOL 10 MG/ML
VIAL (ML) INTRAVENOUS AS NEEDED
Status: DISCONTINUED | OUTPATIENT
Start: 2023-10-03 | End: 2023-10-03 | Stop reason: SURG

## 2023-10-03 RX ORDER — ACETAMINOPHEN 650 MG/1
650 SUPPOSITORY RECTAL EVERY 4 HOURS PRN
Status: DISCONTINUED | OUTPATIENT
Start: 2023-10-04 | End: 2023-10-07 | Stop reason: HOSPADM

## 2023-10-03 RX ORDER — METOCLOPRAMIDE HYDROCHLORIDE 5 MG/ML
10 INJECTION INTRAMUSCULAR; INTRAVENOUS EVERY 6 HOURS
Status: DISPENSED | OUTPATIENT
Start: 2023-10-03 | End: 2023-10-04

## 2023-10-03 RX ORDER — MEPERIDINE HYDROCHLORIDE 25 MG/ML
25 INJECTION INTRAMUSCULAR; INTRAVENOUS; SUBCUTANEOUS EVERY 4 HOURS PRN
Status: ACTIVE | OUTPATIENT
Start: 2023-10-03 | End: 2023-10-03

## 2023-10-03 RX ORDER — ONDANSETRON 2 MG/ML
4 INJECTION INTRAMUSCULAR; INTRAVENOUS EVERY 6 HOURS PRN
Status: DISCONTINUED | OUTPATIENT
Start: 2023-10-03 | End: 2023-10-07 | Stop reason: HOSPADM

## 2023-10-03 RX ORDER — OXYCODONE HYDROCHLORIDE 5 MG/1
10 TABLET ORAL EVERY 4 HOURS PRN
Status: DISCONTINUED | OUTPATIENT
Start: 2023-10-03 | End: 2023-10-07 | Stop reason: HOSPADM

## 2023-10-03 RX ORDER — MAGNESIUM SULFATE HEPTAHYDRATE 500 MG/ML
INJECTION, SOLUTION INTRAMUSCULAR; INTRAVENOUS AS NEEDED
Status: DISCONTINUED | OUTPATIENT
Start: 2023-10-03 | End: 2023-10-03 | Stop reason: SURG

## 2023-10-03 RX ORDER — FAMOTIDINE 10 MG/ML
20 INJECTION, SOLUTION INTRAVENOUS ONCE
Status: COMPLETED | OUTPATIENT
Start: 2023-10-03 | End: 2023-10-03

## 2023-10-03 RX ORDER — CEFAZOLIN SODIUM 2 G/100ML
2000 INJECTION, SOLUTION INTRAVENOUS EVERY 8 HOURS
Status: COMPLETED | OUTPATIENT
Start: 2023-10-03 | End: 2023-10-05

## 2023-10-03 RX ORDER — BISACODYL 10 MG
10 SUPPOSITORY, RECTAL RECTAL DAILY PRN
Status: DISCONTINUED | OUTPATIENT
Start: 2023-10-04 | End: 2023-10-07 | Stop reason: HOSPADM

## 2023-10-03 RX ORDER — PROTAMINE SULFATE 10 MG/ML
INJECTION, SOLUTION INTRAVENOUS AS NEEDED
Status: DISCONTINUED | OUTPATIENT
Start: 2023-10-03 | End: 2023-10-03 | Stop reason: SURG

## 2023-10-03 RX ORDER — ACETAMINOPHEN 325 MG/1
650 TABLET ORAL EVERY 4 HOURS
Status: ACTIVE | OUTPATIENT
Start: 2023-10-03 | End: 2023-10-04

## 2023-10-03 RX ORDER — SODIUM CHLORIDE 0.9 % (FLUSH) 0.9 %
3 SYRINGE (ML) INJECTION EVERY 12 HOURS SCHEDULED
Status: DISCONTINUED | OUTPATIENT
Start: 2023-10-03 | End: 2023-10-03 | Stop reason: HOSPADM

## 2023-10-03 RX ORDER — CEFAZOLIN SODIUM 2 G/100ML
2000 INJECTION, SOLUTION INTRAVENOUS ONCE
Status: COMPLETED | OUTPATIENT
Start: 2023-10-03 | End: 2023-10-03

## 2023-10-03 RX ORDER — ACETAMINOPHEN 10 MG/ML
1000 INJECTION, SOLUTION INTRAVENOUS EVERY 8 HOURS
Status: COMPLETED | OUTPATIENT
Start: 2023-10-03 | End: 2023-10-04

## 2023-10-03 RX ORDER — METHADONE HYDROCHLORIDE 10 MG/ML
INJECTION, SOLUTION INTRAMUSCULAR; INTRAVENOUS; SUBCUTANEOUS AS NEEDED
Status: DISCONTINUED | OUTPATIENT
Start: 2023-10-03 | End: 2023-10-03 | Stop reason: SURG

## 2023-10-03 RX ORDER — ATORVASTATIN CALCIUM 20 MG/1
40 TABLET, FILM COATED ORAL NIGHTLY
Status: DISCONTINUED | OUTPATIENT
Start: 2023-10-03 | End: 2023-10-07 | Stop reason: HOSPADM

## 2023-10-03 RX ORDER — NICOTINE POLACRILEX 4 MG
15 LOZENGE BUCCAL
Status: DISCONTINUED | OUTPATIENT
Start: 2023-10-03 | End: 2023-10-04

## 2023-10-03 RX ORDER — ALPRAZOLAM 0.25 MG/1
0.25 TABLET ORAL EVERY 8 HOURS PRN
Status: DISCONTINUED | OUTPATIENT
Start: 2023-10-03 | End: 2023-10-07 | Stop reason: HOSPADM

## 2023-10-03 RX ORDER — HEPARIN SODIUM 1000 [USP'U]/ML
INJECTION, SOLUTION INTRAVENOUS; SUBCUTANEOUS AS NEEDED
Status: DISCONTINUED | OUTPATIENT
Start: 2023-10-03 | End: 2023-10-03 | Stop reason: SURG

## 2023-10-03 RX ORDER — ENOXAPARIN SODIUM 100 MG/ML
40 INJECTION SUBCUTANEOUS DAILY
Status: DISCONTINUED | OUTPATIENT
Start: 2023-10-04 | End: 2023-10-07 | Stop reason: HOSPADM

## 2023-10-03 RX ORDER — HYDROCODONE BITARTRATE AND ACETAMINOPHEN 5; 325 MG/1; MG/1
2 TABLET ORAL EVERY 4 HOURS PRN
Status: DISCONTINUED | OUTPATIENT
Start: 2023-10-03 | End: 2023-10-07 | Stop reason: HOSPADM

## 2023-10-03 RX ORDER — AMOXICILLIN 250 MG
2 CAPSULE ORAL NIGHTLY
Status: DISCONTINUED | OUTPATIENT
Start: 2023-10-04 | End: 2023-10-07 | Stop reason: HOSPADM

## 2023-10-03 RX ORDER — SODIUM CHLORIDE, SODIUM LACTATE, POTASSIUM CHLORIDE, CALCIUM CHLORIDE 600; 310; 30; 20 MG/100ML; MG/100ML; MG/100ML; MG/100ML
9 INJECTION, SOLUTION INTRAVENOUS CONTINUOUS
Status: DISCONTINUED | OUTPATIENT
Start: 2023-10-03 | End: 2023-10-03

## 2023-10-03 RX ORDER — DEXMEDETOMIDINE HYDROCHLORIDE 4 UG/ML
.2-1.5 INJECTION, SOLUTION INTRAVENOUS
Status: DISCONTINUED | OUTPATIENT
Start: 2023-10-03 | End: 2023-10-04

## 2023-10-03 RX ORDER — DOPAMINE HYDROCHLORIDE 160 MG/100ML
2-20 INJECTION, SOLUTION INTRAVENOUS CONTINUOUS PRN
Status: DISCONTINUED | OUTPATIENT
Start: 2023-10-03 | End: 2023-10-04

## 2023-10-03 RX ORDER — NITROGLYCERIN 0.4 MG/1
0.4 TABLET SUBLINGUAL
Status: DISCONTINUED | OUTPATIENT
Start: 2023-10-03 | End: 2023-10-07 | Stop reason: HOSPADM

## 2023-10-03 RX ORDER — ROCURONIUM BROMIDE 10 MG/ML
INJECTION, SOLUTION INTRAVENOUS AS NEEDED
Status: DISCONTINUED | OUTPATIENT
Start: 2023-10-03 | End: 2023-10-03 | Stop reason: SURG

## 2023-10-03 RX ORDER — NOREPINEPHRINE BITARTRATE 0.03 MG/ML
.02-.2 INJECTION, SOLUTION INTRAVENOUS CONTINUOUS PRN
Status: DISCONTINUED | OUTPATIENT
Start: 2023-10-03 | End: 2023-10-04

## 2023-10-03 RX ORDER — AMINOCAPROIC ACID 250 MG/ML
INJECTION, SOLUTION INTRAVENOUS AS NEEDED
Status: DISCONTINUED | OUTPATIENT
Start: 2023-10-03 | End: 2023-10-03 | Stop reason: SURG

## 2023-10-03 RX ORDER — DEXMEDETOMIDINE HYDROCHLORIDE 4 UG/ML
INJECTION INTRAVENOUS CONTINUOUS PRN
Status: DISCONTINUED | OUTPATIENT
Start: 2023-10-03 | End: 2023-10-03 | Stop reason: SURG

## 2023-10-03 RX ORDER — CHLORHEXIDINE GLUCONATE 500 MG/1
1 CLOTH TOPICAL EVERY 12 HOURS PRN
Status: DISCONTINUED | OUTPATIENT
Start: 2023-10-03 | End: 2023-10-03 | Stop reason: HOSPADM

## 2023-10-03 RX ORDER — IBUPROFEN 600 MG/1
1 TABLET ORAL
Status: DISCONTINUED | OUTPATIENT
Start: 2023-10-03 | End: 2023-10-04

## 2023-10-03 RX ORDER — MIDAZOLAM HYDROCHLORIDE 1 MG/ML
0.5 INJECTION INTRAMUSCULAR; INTRAVENOUS
Status: DISCONTINUED | OUTPATIENT
Start: 2023-10-03 | End: 2023-10-03 | Stop reason: HOSPADM

## 2023-10-03 RX ORDER — CHLORHEXIDINE GLUCONATE ORAL RINSE 1.2 MG/ML
15 SOLUTION DENTAL EVERY 12 HOURS
Status: DISCONTINUED | OUTPATIENT
Start: 2023-10-03 | End: 2023-10-06

## 2023-10-03 RX ORDER — SODIUM CHLORIDE 9 MG/ML
30 INJECTION, SOLUTION INTRAVENOUS CONTINUOUS
Status: DISCONTINUED | OUTPATIENT
Start: 2023-10-03 | End: 2023-10-07 | Stop reason: HOSPADM

## 2023-10-03 RX ADMIN — MORPHINE SULFATE 4 MG: 2 INJECTION, SOLUTION INTRAMUSCULAR; INTRAVENOUS at 15:24

## 2023-10-03 RX ADMIN — HEPARIN SODIUM 25000 UNITS: 1000 INJECTION, SOLUTION INTRAVENOUS; SUBCUTANEOUS at 08:20

## 2023-10-03 RX ADMIN — MAGNESIUM SULFATE HEPTAHYDRATE 2 G: 500 INJECTION, SOLUTION INTRAMUSCULAR; INTRAVENOUS at 09:30

## 2023-10-03 RX ADMIN — OXYCODONE HYDROCHLORIDE 10 MG: 5 TABLET ORAL at 17:10

## 2023-10-03 RX ADMIN — DEXMEDETOMIDINE HYDROCHLORIDE 1 MCG/KG/HR: 4 INJECTION, SOLUTION INTRAVENOUS at 12:48

## 2023-10-03 RX ADMIN — MAGNESIUM SULFATE HEPTAHYDRATE 1 G: 500 INJECTION, SOLUTION INTRAMUSCULAR; INTRAVENOUS at 07:45

## 2023-10-03 RX ADMIN — ROCURONIUM BROMIDE 30 MG: 10 INJECTION, SOLUTION INTRAVENOUS at 08:47

## 2023-10-03 RX ADMIN — MORPHINE SULFATE 4 MG: 2 INJECTION, SOLUTION INTRAMUSCULAR; INTRAVENOUS at 11:40

## 2023-10-03 RX ADMIN — ACETAMINOPHEN 1000 MG: 10 INJECTION, SOLUTION INTRAVENOUS at 11:43

## 2023-10-03 RX ADMIN — 0.12% CHLORHEXIDINE GLUCONATE 15 ML: 1.2 RINSE ORAL at 20:06

## 2023-10-03 RX ADMIN — ACETAMINOPHEN 1000 MG: 10 INJECTION, SOLUTION INTRAVENOUS at 20:06

## 2023-10-03 RX ADMIN — ALBUMIN (HUMAN) 250 ML: 12.5 INJECTION, SOLUTION INTRAVENOUS at 14:13

## 2023-10-03 RX ADMIN — ALBUMIN (HUMAN) 250 ML: 12.5 INJECTION, SOLUTION INTRAVENOUS at 15:51

## 2023-10-03 RX ADMIN — PANTOPRAZOLE SODIUM 40 MG: 40 INJECTION, POWDER, FOR SOLUTION INTRAVENOUS at 11:43

## 2023-10-03 RX ADMIN — AMINOCAPROIC ACID 10 G: 250 INJECTION, SOLUTION INTRAVENOUS at 08:23

## 2023-10-03 RX ADMIN — METHADONE HYDROCHLORIDE 5 MG: 10 INJECTION, SOLUTION INTRAMUSCULAR; INTRAVENOUS; SUBCUTANEOUS at 06:58

## 2023-10-03 RX ADMIN — SODIUM CHLORIDE 2.6 UNITS/HR: 9 INJECTION, SOLUTION INTRAVENOUS at 08:46

## 2023-10-03 RX ADMIN — CYCLOBENZAPRINE 10 MG: 10 TABLET, FILM COATED ORAL at 21:16

## 2023-10-03 RX ADMIN — ACETAMINOPHEN 1000 MG: 500 TABLET ORAL at 06:26

## 2023-10-03 RX ADMIN — OXYCODONE HYDROCHLORIDE 10 MG: 5 TABLET ORAL at 21:16

## 2023-10-03 RX ADMIN — CALCIUM GLUCONATE 2000 MG: 20 INJECTION, SOLUTION INTRAVENOUS at 16:50

## 2023-10-03 RX ADMIN — ATORVASTATIN CALCIUM 40 MG: 20 TABLET, FILM COATED ORAL at 20:06

## 2023-10-03 RX ADMIN — FAMOTIDINE 20 MG: 10 INJECTION INTRAVENOUS at 06:19

## 2023-10-03 RX ADMIN — DEXMEDETOMIDINE HYDROCHLORIDE 1 MCG/KG/HR: 4 INJECTION INTRAVENOUS at 07:21

## 2023-10-03 RX ADMIN — ROCURONIUM BROMIDE 20 MG: 10 INJECTION, SOLUTION INTRAVENOUS at 07:45

## 2023-10-03 RX ADMIN — METHADONE HYDROCHLORIDE 5 MG: 10 INJECTION, SOLUTION INTRAMUSCULAR; INTRAVENOUS; SUBCUTANEOUS at 06:50

## 2023-10-03 RX ADMIN — MAGNESIUM SULFATE HEPTAHYDRATE 1 G: 500 INJECTION, SOLUTION INTRAMUSCULAR; INTRAVENOUS at 06:58

## 2023-10-03 RX ADMIN — ROCURONIUM BROMIDE 50 MG: 10 INJECTION, SOLUTION INTRAVENOUS at 06:58

## 2023-10-03 RX ADMIN — SODIUM CHLORIDE: 9 INJECTION, SOLUTION INTRAVENOUS at 07:21

## 2023-10-03 RX ADMIN — PROTAMINE SULFATE 400 MG: 10 INJECTION, SOLUTION INTRAVENOUS at 09:48

## 2023-10-03 RX ADMIN — PROPOFOL 120 MG: 10 INJECTION, EMULSION INTRAVENOUS at 06:58

## 2023-10-03 RX ADMIN — SODIUM CHLORIDE: 9 INJECTION, SOLUTION INTRAVENOUS at 06:50

## 2023-10-03 RX ADMIN — CEFAZOLIN SODIUM 2 G: 2 INJECTION, SOLUTION INTRAVENOUS at 07:20

## 2023-10-03 RX ADMIN — CEFAZOLIN SODIUM 2 G: 2 INJECTION, SOLUTION INTRAVENOUS at 10:49

## 2023-10-03 RX ADMIN — LIDOCAINE HYDROCHLORIDE 100 MG: 20 INJECTION, SOLUTION INFILTRATION; PERINEURAL at 06:58

## 2023-10-03 RX ADMIN — CEFAZOLIN SODIUM 2000 MG: 2 INJECTION, SOLUTION INTRAVENOUS at 16:51

## 2023-10-03 RX ADMIN — PROPOFOL 30 MCG/KG/MIN: 10 INJECTION, EMULSION INTRAVENOUS at 13:58

## 2023-10-03 RX ADMIN — AMINOCAPROIC ACID 10 G: 250 INJECTION, SOLUTION INTRAVENOUS at 10:49

## 2023-10-03 RX ADMIN — MAGNESIUM SULFATE HEPTAHYDRATE 2 G: 2 INJECTION, SOLUTION INTRAVENOUS at 20:06

## 2023-10-03 RX ADMIN — ALPRAZOLAM 0.25 MG: 0.25 TABLET ORAL at 23:21

## 2023-10-03 RX ADMIN — PROPOFOL 30 MG: 10 INJECTION, EMULSION INTRAVENOUS at 06:50

## 2023-10-03 RX ADMIN — MUPIROCIN 1 APPLICATION: 20 OINTMENT TOPICAL at 20:23

## 2023-10-03 RX ADMIN — PROPOFOL 25 MCG/KG/MIN: 10 INJECTION, EMULSION INTRAVENOUS at 08:30

## 2023-10-03 RX ADMIN — 0.12% CHLORHEXIDINE GLUCONATE 15 ML: 1.2 RINSE ORAL at 06:19

## 2023-10-03 RX ADMIN — METOCLOPRAMIDE 10 MG: 5 INJECTION, SOLUTION INTRAMUSCULAR; INTRAVENOUS at 17:31

## 2023-10-03 RX ADMIN — METOPROLOL TARTRATE 12.5 MG: 25 TABLET, FILM COATED ORAL at 06:19

## 2023-10-03 RX ADMIN — NICARDIPINE HYDROCHLORIDE 5 MG/HR: 25 INJECTION, SOLUTION INTRAVENOUS at 09:49

## 2023-10-03 RX ADMIN — SODIUM CHLORIDE: 9 INJECTION, SOLUTION INTRAVENOUS at 07:20

## 2023-10-03 RX ADMIN — PHENYLEPHRINE HYDROCHLORIDE 0.5 MCG/KG/MIN: 10 INJECTION, SOLUTION INTRAVENOUS at 07:21

## 2023-10-03 RX ADMIN — ALBUMIN (HUMAN) 250 ML: 12.5 INJECTION, SOLUTION INTRAVENOUS at 21:46

## 2023-10-03 RX ADMIN — METOCLOPRAMIDE 10 MG: 5 INJECTION, SOLUTION INTRAMUSCULAR; INTRAVENOUS at 11:43

## 2023-10-03 RX ADMIN — MIDAZOLAM 0.5 MG: 1 INJECTION INTRAMUSCULAR; INTRAVENOUS at 06:32

## 2023-10-03 NOTE — OP NOTE
Operative Note    Date of Dictation: 10/03/23    Date of Procedure: Same    Referring Physician: Nash Grey MD    Preoperative diagnosis:   1.  Severe aortic stenosis  2.  Mild proximal aortic dilatation  3.  Single-vessel coronary artery disease  4.  Bicuspid aortopathy    Postoperative diagnosis:   Same    Procedure:   1.  Elective CABG x 1 with a LIMA to the mid LAD entheses CPT code 69346)  2. AVR with a 27 mm magna pericardial prosthesis (CPT code 72284)    Surgeon: Benton Mcduffie MD     Assistants: Assistant: Kiley Wolfe PA was responsible for performing the following activities: Retraction, Suction, Irrigation, Suturing, Closing, Placing Dressing, Harvesting of Vessels, and all aspects of the complex surgical case  and their skilled assistance was necessary for the success of this case.    Anesthesia: General endotracheal anesthesia and RAYRAY    Findings:  The LIMA was harvested as a pedicle, had good quality, diameter 2 mm and had good flow.  The lad had a diameter greater than 2 mm.  The valve was bicuspid with a conjoined right and left leaflets and severe calcification.  There was mild aortic root dilatation with a diameter measuring approximate 4.2 cm.    Estimated Blood Loss:   Approximately 400 cc, most of it recovered with a Cell Saver device and cardiotomy suckers and was retransfuse to the patient    Specimens: Aortic valve leaflets           Description of the procedure:     The patient was placed supine on the operative table. Anesthesia was given and lines placed. The patient was prepped and draped using the usual sterile technique. A median sternotomy was performed with a scalpel and the layers carried down to the sternum using the electrocautery. The sternum was split in the midline using a vertical oscillating saw. Hemostasis was achieved. The LIMA was harvested as a pedicle and prepared with papaverine. It was of good quality. 300 units/kg of IV heparin was given to an ACT over  400. A Favaloro retractor was placed and the mediastinum exposed, the pericardium was opened and the edges tacked to the wound. Cannulation sutures were placed in the ascending aorta and right atrium. Small cannulas were placed and aorto right atrial cardiopulmonary bypass was started. Cardioplegia cannulas were placed and then the ascending aorta was clamped. One liter of cold blood cardioplegia was given in an antegrade fashion to achieved diastolic arrest and further doses every 15 minutes thereafter.  The LIMA was anastomosed to the mid LAD using a 7-0 Prolene continuous suture in end-to-side fashion.  Anastomosis was checked and the small bulldog was reapplied to the ventricle. A transverse aortotomy was performed and the diseased valve exposed. The leaflets were resected and the annulus debrided and then the area irrigated. The annulus was sized to a 27 mm magna pericardial prosthesis that was washed as recommended by the . 2.0 Prolene continuous suture was used to anastomose continuously each third of the valve with the corresponding part of the annulus.  The valve was descended and the knots secured behind each post. The aortotomy was closed with a double layer of 4.0 Prolene suture and de-aired before closure. The proximal anastomoses were constructed using a 4 mm punch and 6.0 Prolene continuous sutures.  The patient was systemically rewarmed, then the warm dose of cardioplegia was given and then the aortic clamp removed with steep suction on the aortic vent.  The LIMA bulldog was removed as well. All anastomoses were checked and had good flow and morphology. The left pleural space was suctioned and the lungs ventilated. The heart was paced till regular atrial rhythm resumed. I allowed the heart to eject  and I de-aired the left heart chambers under RAYRAY guidance and once hemodynamics were acceptable, then the CPB was discontinued and the venous and cardioplegia cannulas removed. The matching  dose of protamine was given and the aortic cannula removed as well. AV temporary wires and pleural and mediastinal chest tubes were placed and the wound sprayed with platelet rich plasma.  The perioperative RAYRAY showed the valve well seated without  leaks. The sternum was closed with single and double wires and soft tissue in layers of reabsorbable material. The wounds were covered with sterile dressings.    CPB time: 73 minutes    Aortic clamp time: 61 minutes    Complications:  none           Disposition: Cardiovascular recovery room           Condition: Critical but stable.

## 2023-10-03 NOTE — ANESTHESIA PROCEDURE NOTES
Airway  Date/Time: 10/3/2023 7:01 AM  Airway not difficult    General Information and Staff    Anesthesiologist: Nick Gonzalez MD    Indications and Patient Condition    Preoxygenated: yes  Mask difficulty assessment: 1 - vent by mask    Final Airway Details  Final airway type: endotracheal airway      Successful airway: ETT  Cuffed: yes   Successful intubation technique: direct laryngoscopy  Endotracheal tube insertion site: oral  Blade: Galeas  Blade size: 2  ETT size (mm): 8.0  Cormack-Lehane Classification: grade I - full view of glottis  Placement verified by: chest auscultation and capnometry   Measured from: gums  ETT/EBT to gums (cm): 22  ETT/EBT  to teeth (cm): 22  Assessment: lips, teeth, and gum same as pre-op and atraumatic intubation

## 2023-10-03 NOTE — ANESTHESIA PROCEDURE NOTES
Arterial Line      Patient reassessed immediately prior to procedure    Patient location during procedure: OR  Start time: 10/3/2023 6:50 AM  Stop Time:10/3/2023 6:57 AM       Line placed for hemodynamic monitoring.  Performed By   Anesthesiologist: Nick Gonzalez MD   Preanesthetic Checklist  Completed: patient identified and risks and benefits discussed  Arterial Line Prep    Sterile Tech: gloves  Prep: ChloraPrep  Patient monitoring: blood pressure monitoring, continuous pulse oximetry and EKG  Arterial Line Procedure   Laterality:left  Location:  radial artery  Catheter size: 20 G   Guidance: ultrasound guided  PROCEDURE NOTE/ULTRASOUND INTERPRETATION.  Using ultrasound guidance the potential vascular sites for insertion of the catheter were visualized to determine the patency of the vessel to be used for vascular access.  After selecting the appropriate site for insertion, the needle was visualized under ultrasound being inserted into the radial artery, followed by ultrasound confirmation of wire and catheter placement. There were no abnormalities seen on ultrasound; an image was taken; and the patient tolerated the procedure with no complications.   Successful placement: yes Images: still images obtained, printed/placed on the chart  Post Assessment   Dressing Type: occlusive dressing applied and secured with tape.   Complications no   Patient Tolerance: patient tolerated the procedure well with no apparent complications  Additional Notes  Using ultrasound guidance the potential vascular sites for insertion of the catheter were visualized to determine the patency of the vessel to be used for vascular access.  After selecting the appropriate site for insertion, the needle was visualized under ultrasound being inserted into the radial artery, followed by ultrasound confirmation of wire and catheter placement.  There were no abnormalities seen on ultrasound; an image was taken/ and the patient tolerated the  procedure with no complications.

## 2023-10-03 NOTE — ANESTHESIA PROCEDURE NOTES
Central Line      Patient reassessed immediately prior to procedure    Patient location during procedure: OR  Start time: 10/3/2023 7:06 AM  Stop Time:10/3/2023 7:18 AM  Indications: vascular access and central pressure monitoring  Staff  Anesthesiologist: Nick Gonzalez MD  Preanesthetic Checklist  Completed: patient identified and risks and benefits discussed  Central Line Prep  Sterile Tech:cap, gloves, gown, mask and sterile barriers  Prep: chloraprep  Patient monitoring: blood pressure monitoring, continuous pulse oximetry and EKG  Central Line Procedure  Laterality:right  Location:internal jugular  Catheter Type:Cordis  Catheter Size:9 Fr  Guidance:ultrasound guided  PROCEDURE NOTE/ULTRASOUND INTERPRETATION.  Using ultrasound guidance the potential vascular sites for insertion of the catheter were visualized to determine the patency of the vessel to be used for vascular access.  After selecting the appropriate site for insertion, the needle was visualized under ultrasound being inserted into the internal jugular vein, followed by ultrasound confirmation of wire and catheter placement. There were no abnormalities seen on ultrasound; an image was taken; and the patient tolerated the procedure with no complications. Images: still images obtained, printed/placed on chart  Assessment  Post procedure:biopatch applied, line sutured, occlusive dressing applied and secured with tape  Assessement:blood return through all ports and chest x-ray ordered  Complications:no  Patient Tolerance:patient tolerated the procedure well with no apparent complications  Additional Notes  Ultrasound Interpretation:  Using ultrasound guidance the potential vascular sites for insertion of the catheter were visualized to determine the patency of the vessel to be used for vascular access.  After selecting the appropriate site for insertion, the needle was visualized under ultrasound being inserted into the vessel, followed by ultrasound  confirmation of wire and catheter placement.  There were no abnormalities seen on ultrasound; an image was taken/ and the patient tolerated the procedure with no complications.

## 2023-10-03 NOTE — ANESTHESIA PROCEDURE NOTES
Diagnostic IntraOp Theron    Procedure Performed: Diagnostic IntraOp Theron       Start Time:        End Time:      Preanesthesia Checklist:  Procedure being performed at surgeon's request.    General Procedure Information  Diagnostic Indications for Echo:  assessment of ascending aorta, assessment of surgical repair, defect repair evaluation and hemodynamic monitoring  Physician Requesting Echo: Benton Mcduffie MD  CPT Code:  Bicuspid aortic valve, aortic stenosis and regurgitation  Location performed:  OR  Intubated  Bite block placed  Heart visualized  Probe Insertion:  Easy  Probe Type:  Multiplane    Echocardiographic and Doppler Measurements    Ventricles    Right Ventricle:  Cavity size dilated.  Hypertrophy not present.  Thrombus not present.  Global function mildly impaired.    Left Ventricle:  Cavity size normal.  Thrombus not present.  Global Function normal.  Ejection Fraction 50%.          Valves    Aortic Valve:  Annulus calcified.  Stenosis severe.  Area: 0.75cm2 cm².  Mean Gradient: Vmax 3.1, PGmax39,YAenme59,DI 0.13 mmHg.  Regurgitation mild.  Leaflets bicuspid.  Leaflet motions restricted.      Mitral Valve:  Annulus normal.  Stenosis not present.  Area: 2.98 cm².  Mean Gradient: 0 mmHg.  Regurgitation trace.  Leaflets normal.  Leaflet motions normal.      Tricuspid Valve:  Annulus normal.  Stenosis not present.  Regurgitation mild.  Leaflets normal.  Leaflet motions normal.        Aorta    Ascending Aorta:  Size normal.  Dissection not present.  Plaque thickness less than 3 mm.  Mobile plaque not present.    Aortic Arch:  Size normal.  Plaque thickness less than 3 mm.  Mobile plaque not present.    Descending Aorta:  Size normal.  Dissection not present.  Plaque thickness less than 3 mm.  Mobile plaque not present.        Atria    Right Atrium:  Size dilated.  Spontaneous echo contrast not present.  Thrombus not present.    Left Atrium:  Size normal.  Spontaneous echo contrast present.  Thrombus not  present.  Left atrial appendage normal.          Diastolic Function Measurements:  Diastolic Dysfunction Grade=  E=  56 ms  A=  31 ms  E/A Ratio=  1.8  DT=  ms  S/D=   IVRT=    Other Findings  Pleural Effusion:  none  Pulmonary Venous Flow:  normal    Anesthesia Information  Performed Personally  Anesthesiologist:  Nick Gonzalez MD      Echocardiogram Comments:       Severe AS, bicuspid aortic valve, STEPHANIE 0.75  Trace MR  Mild MR, dilated TV annulus, mild impairment RV function    CABG x 1, stented bioprosthetic AVR 27mm Magna Ease, leaflets open normally  Trace paravalvular leak, no AI  Mean gradient 6, Vmax 1.6, PGmax 11  Trace MR TR  MV mean gradient 1  Aorta no dissection

## 2023-10-03 NOTE — ANESTHESIA PROCEDURE NOTES
Pulmonary Artery Catheter      Patient reassessed immediately prior to procedure    Patient location during procedure: OR  Start time: 10/3/2023 7:06 AM  Stop Time:10/3/2023 7:18 AM  Indications: central pressure monitoring, vascular access and MD/Surgeon request  Staff  Anesthesiologist: Nick Gonzalez MD  Preanesthetic Checklist  Completed: patient identified and risks and benefits discussed  Central Line Prep  Sterile Tech:cap, gloves, gown, mask and sterile barriers  Prep: chloraprep  Patient monitoring: blood pressure monitoring, continuous pulse oximetry and EKG  Central Line Procedure  Laterality:right  Location:internal jugular  Catheter Type:Bland-Enriqueta  Catheter Size:7.5 Fr  Guidance:ultrasound guided  PROCEDURE NOTE/ULTRASOUND INTERPRETATION.  Using ultrasound guidance the potential vascular sites for insertion of the catheter were visualized to determine the patency of the vessel to be used for vascular access.  After selecting the appropriate site for insertion, the needle was visualized under ultrasound being inserted into the internal jugular vein, followed by ultrasound confirmation of wire and catheter placement. There were no abnormalities seen on ultrasound; an image was taken; and the patient tolerated the procedure with no complications. Images: still images obtained, printed/placed on chart  Assessment  Post procedure:biopatch applied, line sutured, occlusive dressing applied and secured with tape  Assessement:blood return through all ports and free fluid flow  Complications:no  Patient Tolerance:patient tolerated the procedure well with no apparent complications

## 2023-10-04 ENCOUNTER — APPOINTMENT (OUTPATIENT)
Dept: GENERAL RADIOLOGY | Facility: HOSPITAL | Age: 73
DRG: 221 | End: 2023-10-04
Payer: MEDICARE

## 2023-10-04 LAB
ALBUMIN SERPL-MCNC: 4.4 G/DL (ref 3.5–5.2)
ANION GAP SERPL CALCULATED.3IONS-SCNC: 9.5 MMOL/L (ref 5–15)
BASOPHILS # BLD AUTO: 0 10*3/MM3 (ref 0–0.2)
BASOPHILS NFR BLD AUTO: 0 % (ref 0–1.5)
BUN SERPL-MCNC: 15 MG/DL (ref 8–23)
BUN/CREAT SERPL: 18.1 (ref 7–25)
CA-I BLD-MCNC: 4.6 MG/DL (ref 4.6–5.4)
CA-I SERPL ISE-MCNC: 1.16 MMOL/L (ref 1.15–1.35)
CALCIUM SPEC-SCNC: 8.5 MG/DL (ref 8.6–10.5)
CHLORIDE SERPL-SCNC: 108 MMOL/L (ref 98–107)
CO2 SERPL-SCNC: 24.5 MMOL/L (ref 22–29)
CREAT SERPL-MCNC: 0.83 MG/DL (ref 0.76–1.27)
DEPRECATED RDW RBC AUTO: 43.9 FL (ref 37–54)
EGFRCR SERPLBLD CKD-EPI 2021: 92.4 ML/MIN/1.73
EOSINOPHIL # BLD AUTO: 0 10*3/MM3 (ref 0–0.4)
EOSINOPHIL NFR BLD AUTO: 0 % (ref 0.3–6.2)
ERYTHROCYTE [DISTWIDTH] IN BLOOD BY AUTOMATED COUNT: 14.4 % (ref 12.3–15.4)
GLUCOSE BLDC GLUCOMTR-MCNC: 120 MG/DL (ref 70–130)
GLUCOSE BLDC GLUCOMTR-MCNC: 120 MG/DL (ref 70–130)
GLUCOSE BLDC GLUCOMTR-MCNC: 121 MG/DL (ref 70–130)
GLUCOSE BLDC GLUCOMTR-MCNC: 130 MG/DL (ref 70–130)
GLUCOSE BLDC GLUCOMTR-MCNC: 136 MG/DL (ref 70–130)
GLUCOSE BLDC GLUCOMTR-MCNC: 154 MG/DL (ref 70–130)
GLUCOSE BLDC GLUCOMTR-MCNC: 176 MG/DL (ref 70–130)
GLUCOSE SERPL-MCNC: 130 MG/DL (ref 65–99)
HCT VFR BLD AUTO: 30.9 % (ref 37.5–51)
HGB BLD-MCNC: 10.2 G/DL (ref 13–17.7)
IMM GRANULOCYTES # BLD AUTO: 0.03 10*3/MM3 (ref 0–0.05)
IMM GRANULOCYTES NFR BLD AUTO: 0.4 % (ref 0–0.5)
INR PPP: 1.24 (ref 0.9–1.1)
LYMPHOCYTES # BLD AUTO: 0.72 10*3/MM3 (ref 0.7–3.1)
LYMPHOCYTES NFR BLD AUTO: 9.8 % (ref 19.6–45.3)
MAGNESIUM SERPL-MCNC: 2.6 MG/DL (ref 1.6–2.4)
MCH RBC QN AUTO: 27.6 PG (ref 26.6–33)
MCHC RBC AUTO-ENTMCNC: 33 G/DL (ref 31.5–35.7)
MCV RBC AUTO: 83.5 FL (ref 79–97)
MONOCYTES # BLD AUTO: 0.69 10*3/MM3 (ref 0.1–0.9)
MONOCYTES NFR BLD AUTO: 9.4 % (ref 5–12)
NEUTROPHILS NFR BLD AUTO: 5.89 10*3/MM3 (ref 1.7–7)
NEUTROPHILS NFR BLD AUTO: 80.4 % (ref 42.7–76)
NRBC BLD AUTO-RTO: 0 /100 WBC (ref 0–0.2)
PHOSPHATE SERPL-MCNC: 3.8 MG/DL (ref 2.5–4.5)
PLATELET # BLD AUTO: 99 10*3/MM3 (ref 140–450)
PMV BLD AUTO: 9.9 FL (ref 6–12)
POTASSIUM SERPL-SCNC: 3.9 MMOL/L (ref 3.5–5.2)
PROTHROMBIN TIME: 15.8 SECONDS (ref 11.7–14.2)
RBC # BLD AUTO: 3.7 10*6/MM3 (ref 4.14–5.8)
SODIUM SERPL-SCNC: 142 MMOL/L (ref 136–145)
WBC NRBC COR # BLD: 7.33 10*3/MM3 (ref 3.4–10.8)

## 2023-10-04 PROCEDURE — 93005 ELECTROCARDIOGRAM TRACING: CPT | Performed by: NURSE PRACTITIONER

## 2023-10-04 PROCEDURE — 71045 X-RAY EXAM CHEST 1 VIEW: CPT

## 2023-10-04 PROCEDURE — 25010000002 MAGNESIUM SULFATE 2 GM/50ML SOLUTION: Performed by: NURSE PRACTITIONER

## 2023-10-04 PROCEDURE — 85025 COMPLETE CBC W/AUTO DIFF WBC: CPT | Performed by: NURSE PRACTITIONER

## 2023-10-04 PROCEDURE — 83735 ASSAY OF MAGNESIUM: CPT | Performed by: NURSE PRACTITIONER

## 2023-10-04 PROCEDURE — 25010000002 ACETAMINOPHEN 10 MG/ML SOLUTION: Performed by: NURSE PRACTITIONER

## 2023-10-04 PROCEDURE — 0 POTASSIUM CHLORIDE PER 2 MEQ: Performed by: THORACIC SURGERY (CARDIOTHORACIC VASCULAR SURGERY)

## 2023-10-04 PROCEDURE — 80069 RENAL FUNCTION PANEL: CPT | Performed by: NURSE PRACTITIONER

## 2023-10-04 PROCEDURE — 25010000002 ENOXAPARIN PER 10 MG: Performed by: NURSE PRACTITIONER

## 2023-10-04 PROCEDURE — 97110 THERAPEUTIC EXERCISES: CPT

## 2023-10-04 PROCEDURE — 99024 POSTOP FOLLOW-UP VISIT: CPT | Performed by: THORACIC SURGERY (CARDIOTHORACIC VASCULAR SURGERY)

## 2023-10-04 PROCEDURE — 85610 PROTHROMBIN TIME: CPT | Performed by: NURSE PRACTITIONER

## 2023-10-04 PROCEDURE — 25010000002 CEFAZOLIN IN DEXTROSE 2-4 GM/100ML-% SOLUTION: Performed by: NURSE PRACTITIONER

## 2023-10-04 PROCEDURE — 63710000001 INSULIN LISPRO (HUMAN) PER 5 UNITS: Performed by: NURSE PRACTITIONER

## 2023-10-04 PROCEDURE — 82330 ASSAY OF CALCIUM: CPT | Performed by: NURSE PRACTITIONER

## 2023-10-04 PROCEDURE — 99222 1ST HOSP IP/OBS MODERATE 55: CPT | Performed by: INTERNAL MEDICINE

## 2023-10-04 PROCEDURE — P9041 ALBUMIN (HUMAN),5%, 50ML: HCPCS | Performed by: NURSE PRACTITIONER

## 2023-10-04 PROCEDURE — 94799 UNLISTED PULMONARY SVC/PX: CPT

## 2023-10-04 PROCEDURE — 82948 REAGENT STRIP/BLOOD GLUCOSE: CPT

## 2023-10-04 PROCEDURE — 25010000002 MORPHINE PER 10 MG: Performed by: NURSE PRACTITIONER

## 2023-10-04 PROCEDURE — 93010 ELECTROCARDIOGRAM REPORT: CPT | Performed by: INTERNAL MEDICINE

## 2023-10-04 PROCEDURE — 25010000002 ALBUMIN HUMAN 5% PER 50 ML: Performed by: NURSE PRACTITIONER

## 2023-10-04 PROCEDURE — 97162 PT EVAL MOD COMPLEX 30 MIN: CPT

## 2023-10-04 RX ORDER — INSULIN LISPRO 100 [IU]/ML
2-9 INJECTION, SOLUTION INTRAVENOUS; SUBCUTANEOUS
Status: DISCONTINUED | OUTPATIENT
Start: 2023-10-04 | End: 2023-10-07 | Stop reason: HOSPADM

## 2023-10-04 RX ORDER — IBUPROFEN 600 MG/1
1 TABLET ORAL
Status: DISCONTINUED | OUTPATIENT
Start: 2023-10-04 | End: 2023-10-07 | Stop reason: HOSPADM

## 2023-10-04 RX ORDER — NICOTINE POLACRILEX 4 MG
15 LOZENGE BUCCAL
Status: DISCONTINUED | OUTPATIENT
Start: 2023-10-04 | End: 2023-10-07 | Stop reason: HOSPADM

## 2023-10-04 RX ORDER — DEXTROSE MONOHYDRATE 25 G/50ML
25 INJECTION, SOLUTION INTRAVENOUS
Status: DISCONTINUED | OUTPATIENT
Start: 2023-10-04 | End: 2023-10-07 | Stop reason: HOSPADM

## 2023-10-04 RX ORDER — POTASSIUM CHLORIDE 29.8 MG/ML
20 INJECTION INTRAVENOUS ONCE
Status: COMPLETED | OUTPATIENT
Start: 2023-10-04 | End: 2023-10-04

## 2023-10-04 RX ORDER — GUAIFENESIN 600 MG/1
1200 TABLET, EXTENDED RELEASE ORAL EVERY 12 HOURS SCHEDULED
Status: DISCONTINUED | OUTPATIENT
Start: 2023-10-04 | End: 2023-10-07 | Stop reason: HOSPADM

## 2023-10-04 RX ADMIN — INSULIN LISPRO 2 UNITS: 100 INJECTION, SOLUTION INTRAVENOUS; SUBCUTANEOUS at 21:53

## 2023-10-04 RX ADMIN — 0.12% CHLORHEXIDINE GLUCONATE 15 ML: 1.2 RINSE ORAL at 20:38

## 2023-10-04 RX ADMIN — ACETAMINOPHEN 1000 MG: 10 INJECTION, SOLUTION INTRAVENOUS at 03:26

## 2023-10-04 RX ADMIN — MAGNESIUM SULFATE HEPTAHYDRATE 2 G: 2 INJECTION, SOLUTION INTRAVENOUS at 03:26

## 2023-10-04 RX ADMIN — MUPIROCIN 1 APPLICATION: 20 OINTMENT TOPICAL at 08:44

## 2023-10-04 RX ADMIN — CYCLOBENZAPRINE 10 MG: 10 TABLET, FILM COATED ORAL at 12:00

## 2023-10-04 RX ADMIN — SENNOSIDES AND DOCUSATE SODIUM 2 TABLET: 50; 8.6 TABLET ORAL at 20:37

## 2023-10-04 RX ADMIN — MUPIROCIN 1 APPLICATION: 20 OINTMENT TOPICAL at 20:38

## 2023-10-04 RX ADMIN — CEFAZOLIN SODIUM 2000 MG: 2 INJECTION, SOLUTION INTRAVENOUS at 17:33

## 2023-10-04 RX ADMIN — POTASSIUM CHLORIDE 20 MEQ: 29.8 INJECTION, SOLUTION INTRAVENOUS at 04:53

## 2023-10-04 RX ADMIN — ALBUMIN (HUMAN) 250 ML: 12.5 INJECTION, SOLUTION INTRAVENOUS at 04:54

## 2023-10-04 RX ADMIN — ASPIRIN 81 MG: 81 TABLET, COATED ORAL at 08:44

## 2023-10-04 RX ADMIN — HYDROCODONE BITARTRATE AND ACETAMINOPHEN 2 TABLET: 5; 325 TABLET ORAL at 21:53

## 2023-10-04 RX ADMIN — MORPHINE SULFATE 1 MG: 2 INJECTION, SOLUTION INTRAMUSCULAR; INTRAVENOUS at 00:26

## 2023-10-04 RX ADMIN — CEFAZOLIN SODIUM 2000 MG: 2 INJECTION, SOLUTION INTRAVENOUS at 01:05

## 2023-10-04 RX ADMIN — OXYCODONE HYDROCHLORIDE 10 MG: 5 TABLET ORAL at 09:23

## 2023-10-04 RX ADMIN — ENOXAPARIN SODIUM 40 MG: 100 INJECTION SUBCUTANEOUS at 17:36

## 2023-10-04 RX ADMIN — PANTOPRAZOLE SODIUM 40 MG: 40 TABLET, DELAYED RELEASE ORAL at 06:01

## 2023-10-04 RX ADMIN — METOPROLOL TARTRATE 12.5 MG: 25 TABLET, FILM COATED ORAL at 20:38

## 2023-10-04 RX ADMIN — HYDROCODONE BITARTRATE AND ACETAMINOPHEN 2 TABLET: 5; 325 TABLET ORAL at 17:47

## 2023-10-04 RX ADMIN — OXYCODONE HYDROCHLORIDE 10 MG: 5 TABLET ORAL at 02:47

## 2023-10-04 RX ADMIN — ATORVASTATIN CALCIUM 40 MG: 20 TABLET, FILM COATED ORAL at 20:36

## 2023-10-04 RX ADMIN — CYCLOBENZAPRINE 10 MG: 10 TABLET, FILM COATED ORAL at 20:35

## 2023-10-04 RX ADMIN — 0.12% CHLORHEXIDINE GLUCONATE 15 ML: 1.2 RINSE ORAL at 08:44

## 2023-10-04 RX ADMIN — CEFAZOLIN SODIUM 2000 MG: 2 INJECTION, SOLUTION INTRAVENOUS at 09:23

## 2023-10-04 RX ADMIN — INSULIN LISPRO 3 UNITS: 100 INJECTION, SOLUTION INTRAVENOUS; SUBCUTANEOUS at 17:34

## 2023-10-04 RX ADMIN — GUAIFENESIN 1200 MG: 600 TABLET, EXTENDED RELEASE ORAL at 20:36

## 2023-10-04 RX ADMIN — GUAIFENESIN 1200 MG: 600 TABLET, EXTENDED RELEASE ORAL at 08:44

## 2023-10-04 NOTE — CONSULTS
Met with patient and his wife, discussed benefits of cardiac rehab. Provided phase II information along with the contact information for cardiac rehab here at [facility]. Explained if receiving home health would not be able to attend cardiac rehab until finished with home health.

## 2023-10-04 NOTE — CONSULTS
Date of Consultation: 10/04/23    Referral Provider: Benton Mcduffie MD     Reason for Consultation: Post open heart management.    Encounter Provider: Panda Bills MD    Group of Service: [facility]     Patient Name: Jeremi Shankar Sr.    :1950    Chief complaint: Ascending aortic aneurysm      History of Present Illness:  Jeremi Shankar is a 72 year old who follows with Dr. Grey.  He has a past medical history that is significant for HTN, HLD, and DM. He saw Dr. Grey in 2023 for evaluation of a murmur. His echocardiogram showed a bicuspid valve with moderate to severe stenosis. This also showed moderate dilation of the aortic root measuring 4.7 cm. His follow up echocardiogram in 2023 showed severe aortic valve stenosis though he remained relatively asymptomatic with this. A CTA on 23 showed his aortic root measuring 3.9 cm and ascending aorta measuring maximally at 4.7 cm. He underwent left heart catheterization on 23 with Dr. Desai which revealed single vessel CAD with 70% lesion in the proximal LAD. He was seen by Dr. Mcduffie on 8/15/23, and surgical intervention was recommended.    He presented for surgery with Dr. Mcduffie on 10/3/23. He underwent elective CABG x1 with a LIMA to the mid LAD and AVR with a 27 mm magna pericardial prosthesis.     He was doing well this morning when I saw him and was sitting up in a chair.  He was off of all pressors.  He is rhythm was sinus rhythm in the 70s for    ECHO 23    Left ventricular systolic function is normal. Calculated left ventricular EF = 61.4% Normal left ventricular cavity size and wall thickness noted. All left ventricular wall segments contract normally. Left ventricular diastolic function is consistent with (grade I) impaired relaxation.    The aortic valve is abnormal in structure. A bicuspid aortic valve with fused right-sided and left-sided cusps. There is severe thickening of the  aortic valve. Trace aortic valve regurgitation is present. Severe aortic valve stenosis is present. Aortic valve area is 0.66 cm2. Peak velocity of the flow distal to the aortic valve is 429.4 cm/s. Aortic valve maximum pressure gradient is 73.8 mmHg. Aortic valve mean pressure gradient is 48.9 mmHg. Aortic valve dimensionless index is 0.20 .    Mild dilation of the aortic root is present. Aortic root = 4.5 cm Mild dilation of the ascending aorta is present. Ascending aorta = 4.0 cm    Cardiac Catheterization 8/8/23  Conclusions:  Severe single-vessel coronary disease with 70% proximal LAD segment stenoses otherwise luminal regularities throughout the circumflex and 30 to 40% proximal and mid RCA segment stenoses.  Normal right-sided filling pressures with normal cardiac output of 4.7 L/min and index of 2.4 L/min/m2    Past Medical History:   Diagnosis Date    Aortic stenosis 09/29/2023    Aortic stenosis due to bicuspid aortic valve     Arthritis 2019    arthritis is in my hands    Benign essential hypertension     Bilateral carpal tunnel syndrome 06/16/2020    Cataract 2021    Eye doctor saw them in exam    CHF (congestive heart failure)     Colon polyp 2014 or 2015    Diverticulosis of large intestine 03/06/2016    Description: March 2015????????colonoscopy????????Dr. Francis????????diverticulosis/colon polyps (2).    Headache 1965    History of transfusion     Hyperlipidemia     Type 2 diabetes mellitus     Visual impairment     glasses for reading         Past Surgical History:   Procedure Laterality Date    ASCENDING AORTIC ANEURYSM REPAIR W/ MECHANICAL AORTIC VALVE REPLACEMENT N/A 10/3/2023    Procedure: INTRAOPERATIVE RAYRAY; STERNOTOMY; CORONARY ARTERY BYPASS GRAFT TIMES ONE USING LEFT INTERNAL MAMMARY ARTERY; AORTIC VALVE REPLACEMENT; NEURO MONITORING AND PRP.;  Surgeon: Benton Mcduffie MD;  Location: [facility];  Service: Cardiothoracic;  Laterality: N/A;    CARDIAC CATHETERIZATION N/A 8/8/2023     Procedure: Right Heart Cath;  Surgeon: Kun Desai MD;  Location:  [facility];  Service: Cardiovascular;  Laterality: N/A;    CARDIAC CATHETERIZATION N/A 2023    Procedure: Coronary angiography;  Surgeon: Kun Desai MD;  Location:  [facility];  Service: Cardiovascular;  Laterality: N/A;    CARPAL TUNNEL RELEASE Left 2022    COLONOSCOPY      Dr Francis --WNL.  €“Dr. Hernandez€“colon polyps (2); diverticulosis.    COLONOSCOPY  ?    polyps--DR Francis    HERNIA REPAIR      (R) REPAIR TWICE         Allergies   Allergen Reactions    Lisinopril Cough         No current facility-administered medications on file prior to encounter.     Current Outpatient Medications on File Prior to Encounter   Medication Sig Dispense Refill    metFORMIN ER (GLUCOPHAGE-XR) 500 MG 24 hr tablet TAKE 1 TABLET BY MOUTH WITH DINNER 90 tablet 3         Social History     Socioeconomic History    Marital status:      Spouse name: Mia    Number of children: 2   Tobacco Use    Smoking status: Never     Passive exposure: Never    Smokeless tobacco: Never    Tobacco comments:     Never Smoked   Vaping Use    Vaping Use: Never used   Substance and Sexual Activity    Alcohol use: No    Drug use: Never    Sexual activity: Yes     Partners: Female         Family History   Problem Relation Age of Onset    Hypertension Mother         passed at 87    Heart attack Father 86    Heart disease Father     No Known Problems Sister     Asthma Brother          of covid complications    Coronary artery disease Brother     No Known Problems Brother     Diabetes Maternal Grandfather     Dementia Neg Hx     Colon cancer Neg Hx     Prostate cancer Neg Hx     Malig Hyperthermia Neg Hx        REVIEW OF SYSTEMS:   Pertinent positives are noted in the HPI above.  Otherwise, all other systems were reviewed, and are negative.     Objective:     Vitals:    10/04/23 0800 10/04/23 0900 10/04/23  "1205 10/04/23 1547   BP: 113/65 124/68 119/70 128/69   BP Location:   Right arm Right arm   Patient Position:   Sitting Sitting   Pulse: 69 72 80 85   Resp: 16 16 16 16   Temp:   98.7 °F (37.1 °C) 99.1 °F (37.3 °C)   TempSrc:   Oral Oral   SpO2: 100% 100% 99% 91%   Weight:       Height:         Body mass index is 27.52 kg/m².  Flowsheet Rows      Flowsheet Row First Filed Value   Admission Height 177.8 cm (70\") Documented at 10/03/2023 0536   Admission Weight 87 kg (191 lb 12.8 oz) Documented at 10/04/2023 0500             General:    No acute distress, alert and oriented x4, pleasant                   Head:    Normocephalic, atraumatic.   Eyes:          Conjunctivae and sclerae normal, no icterus.   Throat:   No oral lesions, no thrush, oral mucosa moist.    Neck:   Supple, trachea midline.   Lungs:     Clear to auscultation bilaterally     Heart:    Regular rhythm and normal rate.  No murmurs, gallops, or rubs noted.   Abdomen:     Soft, non-tender, non-distended, positive bowel sounds.    Extremities:   No clubbing, cyanosis, or edema.     Pulses:   Pulses palpable and equal bilaterally.    Skin:   No bleeding or rash.   Neuro:   Non-focal.  Moves all extremities well.    Psychiatric:   Normal mood and affect.           Lab Review:                Results from last 7 days   Lab Units 10/04/23  0251   SODIUM mmol/L 142   POTASSIUM mmol/L 3.9   CHLORIDE mmol/L 108*   CO2 mmol/L 24.5   BUN mg/dL 15   CREATININE mg/dL 0.83   GLUCOSE mg/dL 130*   CALCIUM mg/dL 8.5*         Results from last 7 days   Lab Units 10/04/23  0251   WBC 10*3/mm3 7.33   HEMOGLOBIN g/dL 10.2*   HEMATOCRIT % 30.9*   PLATELETS 10*3/mm3 99*     Results from last 7 days   Lab Units 10/04/23  0251 10/03/23  1112 09/29/23  0822   INR  1.24* 1.29* 0.90   APTT seconds  --  30.4 27.1     Results from last 7 days   Lab Units 09/29/23  0822   CHOLESTEROL mg/dL 127     Results from last 7 days   Lab Units 10/04/23  0251   MAGNESIUM mg/dL 2.6*     Results " from last 7 days   Lab Units 09/29/23  0822   CHOLESTEROL mg/dL 127   TRIGLYCERIDES mg/dL 115   HDL CHOL mg/dL 48   LDL CHOL mg/dL 58       EKG (reviewed by me personally):        Assessment:   1.  Congenital bicuspid aortic valve with severe aortic stenosis  2.  Coronary artery disease  3.  Ascending aortic dilatation  4.  Status post 27 mm tissue AVR and one-vessel CABG (LIMA to mid LAD) by Dr. Mcduffie on 10/3/2023.  5.  Expected postoperative anemia  6.  Hypertension  7.  Hyperlipidemia  8.  Type 2 diabetes  9.  Postoperative thrombocytopenia    Plan:       He seems to be doing well.  He was off all pressors and drips, and was sitting up alert in the chair this morning.  He was in sinus rhythm with a rate in the 70s.  For now, I would continue routine postoperative care.  His medications can be adjusted tomorrow, and he has already been moved to CVU at the time of this note.  There had been concern for an ascending aortic aneurysm preoperatively, although it appears that the aorta was not noted to be as large in the OR.    Cardiology will continue to follow.  Thank you very much for this consult.    Erasmo Bills MD

## 2023-10-04 NOTE — PLAN OF CARE
Goal Outcome Evaluation:  Plan of Care Reviewed With: patient        Progress: improving  Outcome Evaluation: Pt seen for PT eval this am. He is POD 1 CABG x 1 and AVR. He presents w expected post op pain, weakness, and decreased functional mobility. At baseline, pt is independent w mobility and ADLs. He lives at home w his wife. Today, pt up in chair upon entry to room. He is able to stand w CGA/min A x 2. He ambulated approx 80 ft w HHA/CGA x 2. No overt unsteadiness noted. Mild c/o dizziness when up. Pt back in chair at end of session w all lines intact. He plans upon DC when appropriate and will continue to benefit from skilled PT to maximize safety , strength, and independence w mobility.      Anticipated Discharge Disposition (PT): home with assist, home with home health

## 2023-10-04 NOTE — ANESTHESIA POSTPROCEDURE EVALUATION
"Patient: Jeremi Shankar Sr.    Procedure Summary       Date: 10/03/23 Room / Location: [address] CARDIOVASCULAR OPERATING ROOM    Anesthesia Start: 0646 Anesthesia Stop: 1117    Procedure: INTRAOPERATIVE RAYRAY; STERNOTOMY; CORONARY ARTERY BYPASS GRAFT TIMES ONE USING LEFT INTERNAL MAMMARY ARTERY; AORTIC VALVE REPLACEMENT; NEURO MONITORING AND PRP. (Chest) Diagnosis:       Aortic stenosis due to bicuspid aortic valve      Nonrheumatic aortic (valve) stenosis      (Aortic stenosis due to bicuspid aortic valve [Q23.0, Q23.1])      (Nonrheumatic aortic (valve) stenosis [I35.0])    Surgeons: Benton Mcduffie MD Provider: Nick Gonzalez MD    Anesthesia Type: general ASA Status: 4            Anesthesia Type: general    Vitals  Vitals Value Taken Time   /68 10/04/23 0901   Temp 37.3 °C (99.14 °F) 10/04/23 0746   Pulse 72 10/04/23 1054   Resp 16 10/04/23 0900   SpO2 97 % 10/04/23 1054   Vitals shown include unvalidated device data.        Post Anesthesia Care and Evaluation    Pain management: adequate    Airway patency: patent  Anesthetic complications: No anesthetic complications    Cardiovascular status: acceptable  Respiratory status: acceptable  Hydration status: acceptable    Comments: /68   Pulse 72   Temp 37.3 °C (99.1 °F) (Core)   Resp 16   Ht 177.8 cm (70\")   Wt 87 kg (191 lb 12.8 oz)   SpO2 100%   BMI 27.52 kg/m²       "

## 2023-10-04 NOTE — THERAPY EVALUATION
Patient Name: Jeremi Shankar .  : 1950    MRN: [id number]                              Today's Date: 10/4/2023       Admit Date: 10/3/2023    Visit Dx:     ICD-10-CM ICD-9-CM   1. S/P CABG (coronary artery bypass graft)  Z95.1 V45.81   2. Aortic stenosis due to bicuspid aortic valve  Q23.0 746.3    Q23.1 746.4   3. Nonrheumatic aortic (valve) stenosis  I35.0 424.1     Patient Active Problem List   Diagnosis    History of colon polyps    Primary hypertension    Prediabetes    Hyperlipidemia    Peyronie's disease    Aortic stenosis due to bicuspid aortic valve    Aneurysm of ascending aorta without rupture    Ascending aortic aneurysm     Past Medical History:   Diagnosis Date    Aortic stenosis 2023    Aortic stenosis due to bicuspid aortic valve     Arthritis     arthritis is in my hands    Benign essential hypertension     Bilateral carpal tunnel syndrome 2020    Cataract     Eye doctor saw them in exam    CHF (congestive heart failure)     Colon polyp  or     Diverticulosis of large intestine 2016    Description: 2015????????colonoscopy????????Dr. Francis????????diverticulosis/colon polyps (2).    Headache 1965    History of transfusion     Hyperlipidemia     Type 2 diabetes mellitus     Visual impairment     glasses for reading     Past Surgical History:   Procedure Laterality Date    ASCENDING AORTIC ANEURYSM REPAIR W/ MECHANICAL AORTIC VALVE REPLACEMENT N/A 10/3/2023    Procedure: INTRAOPERATIVE RAYRAY; STERNOTOMY; CORONARY ARTERY BYPASS GRAFT TIMES ONE USING LEFT INTERNAL MAMMARY ARTERY; AORTIC VALVE REPLACEMENT; NEURO MONITORING AND PRP.;  Surgeon: Benton Mcduffie MD;  Location: [facility] CVOR;  Service: Cardiothoracic;  Laterality: N/A;    CARDIAC CATHETERIZATION N/A 2023    Procedure: Right Heart Cath;  Surgeon: Kun Desai MD;  Location: [facility] CATH INVASIVE LOCATION;  Service: Cardiovascular;  Laterality: N/A;    CARDIAC CATHETERIZATION N/A  8/8/2023    Procedure: Coronary angiography;  Surgeon: Kun Desai MD;  Location: [facility] INVASIVE LOCATION;  Service: Cardiovascular;  Laterality: N/A;    CARPAL TUNNEL RELEASE Left 05/2022    COLONOSCOPY  2007    Dr Francis --WNL.  March 2015â€“Dr. Hernandez€“colon polyps (2); diverticulosis.    COLONOSCOPY  2014?    polyps--DR Francis    HERNIA REPAIR      (R) REPAIR TWICE      General Information       Row Name 10/04/23 1506          Physical Therapy Time and Intention    Document Type evaluation  -EJ     Mode of Treatment physical therapy  -EJ       Row Name 10/04/23 1506          General Information    Patient Profile Reviewed yes  -EJ     Prior Level of Function independent:;all household mobility;community mobility;ADL's  -EJ     Existing Precautions/Restrictions fall;cardiac;sternal  -EJ     Barriers to Rehab none identified  -EJ       Row Name 10/04/23 1506          Living Environment    People in Home spouse  -EJ       Row Name 10/04/23 1506          Home Main Entrance    Number of Stairs, Main Entrance two  -EJ       Row Name 10/04/23 1506          Cognition    Orientation Status (Cognition) oriented x 3  -EJ       Row Name 10/04/23 1506          Safety Issues, Functional Mobility    Impairments Affecting Function (Mobility) strength;endurance/activity tolerance;pain  -EJ               User Key  (r) = Recorded By, (t) = Taken By, (c) = Cosigned By      Initials Name Provider Type    EJ Henny Preciado, PT Physical Therapist                   Mobility       Row Name 10/04/23 1509          Bed Mobility    Comment, (Bed Mobility) up in chair  -       Row Name 10/04/23 1509          Sit-Stand Transfer    Sit-Stand Hamel (Transfers) verbal cues;nonverbal cues (demo/gesture);contact guard;minimum assist (75% patient effort);2 person assist  -       Row Name 10/04/23 1509          Gait/Stairs (Locomotion)    Hamel Level (Gait) nonverbal cues (demo/gesture);verbal cues;contact  guard;minimum assist (75% patient effort);2 person assist  -EJ     Distance in Feet (Gait) 80  -EJ     Deviations/Abnormal Patterns (Gait) goyo decreased;stride length decreased  -EJ     Bilateral Gait Deviations forward flexed posture  -EJ     Comment, (Gait/Stairs) mild c/o dizziness, no overt LOB noted  -EJ               User Key  (r) = Recorded By, (t) = Taken By, (c) = Cosigned By      Initials Name Provider Type    EJ Henny Preciado, PT Physical Therapist                   Obj/Interventions       Row Name 10/04/23 1511          Range of Motion Comprehensive    General Range of Motion no range of motion deficits identified  -EJ       Row Name 10/04/23 1511          Strength Comprehensive (MMT)    Comment, General Manual Muscle Testing (MMT) Assessment generalized weakness  -EJ       Row Name 10/04/23 1511          Motor Skills    Therapeutic Exercise --  cardiac protocol x 5 reps  -EJ               User Key  (r) = Recorded By, (t) = Taken By, (c) = Cosigned By      Initials Name Provider Type    EJ Henny Preciado, PT Physical Therapist                   Goals/Plan       Row Name 10/04/23 1516          Problem Specific Goal 1 (PT)    Problem Specific Goal 1 (PT) Cardiac Level 3  -EJ     Time Frame (Problem Specific Goal 1, PT) 1 week  -EJ       Row Name 10/04/23 1516          Therapy Assessment/Plan (PT)    Planned Therapy Interventions (PT) balance training;bed mobility training;gait training;home exercise program;stretching;strengthening;stair training;ROM (range of motion);patient/family education;transfer training  -EJ               User Key  (r) = Recorded By, (t) = Taken By, (c) = Cosigned By      Initials Name Provider Type    EJ Henny Preciado, PT Physical Therapist                   Clinical Impression       Row Name 10/04/23 1511          Pain    Pretreatment Pain Rating 2/10  -EJ     Pain Location incisional  -EJ     Pain Location - chest  -EJ     Pain Intervention(s)  Repositioned;Ambulation/increased activity  -EJ       Row Name 10/04/23 1511          Plan of Care Review    Plan of Care Reviewed With patient  -EJ     Progress improving  -EJ     Outcome Evaluation Pt seen for PT eval this am. He is POD 1 CABG x 1 and AVR. He presents w expected post op pain, weakness, and decreased functional mobility. At baseline, pt is independent w mobility and ADLs. He lives at home w his wife. Today, pt up in chair upon entry to room. He is able to stand w CGA/min A x 2. He ambulated approx 80 ft w HHA/CGA x 2. No overt unsteadiness noted. Mild c/o dizziness when up. Pt back in chair at end of session w all lines intact. He plans upon DC when appropriate and will continue to benefit from skilled PT to maximize safety , strength, and independence w mobility.  -       Row Name 10/04/23 1511          Therapy Assessment/Plan (PT)    Rehab Potential (PT) good, to achieve stated therapy goals  -EJ     Criteria for Skilled Interventions Met (PT) yes  -EJ     Therapy Frequency (PT) daily  -EJ       Row Name 10/04/23 1511          Vital Signs    Pre Systolic BP Rehab 124  -EJ     Pre Treatment Diastolic BP 68  -EJ     Post Systolic BP Rehab 122  -EJ     Post Treatment Diastolic BP 97  -EJ     Pretreatment Heart Rate (beats/min) 72  -EJ     Posttreatment Heart Rate (beats/min) 73  -EJ     Pre SpO2 (%) 100  -EJ     O2 Delivery Pre Treatment supplemental O2  -EJ     O2 Delivery Intra Treatment supplemental O2  -EJ     Post SpO2 (%) 100  -EJ     O2 Delivery Post Treatment supplemental O2  -EJ     Pre Patient Position Sitting  -EJ     Intra Patient Position Standing  -EJ     Post Patient Position Sitting  -EJ       Row Name 10/04/23 1511          Positioning and Restraints    Pre-Treatment Position sitting in chair/recliner  -EJ     Post Treatment Position chair  -EJ     In Chair notified nsg;reclined;call light within reach;encouraged to call for assist;exit alarm on;with family/caregiver  -EJ                User Key  (r) = Recorded By, (t) = Taken By, (c) = Cosigned By      Initials Name Provider Type    Henny Rios, PT Physical Therapist                   Outcome Measures       Row Name 10/04/23 1516          How much help from another person do you currently need...    Turning from your back to your side while in flat bed without using bedrails? 3  -EJ     Moving from lying on back to sitting on the side of a flat bed without bedrails? 3  -EJ     Moving to and from a bed to a chair (including a wheelchair)? 3  -EJ     Standing up from a chair using your arms (e.g., wheelchair, bedside chair)? 3  -EJ     Climbing 3-5 steps with a railing? 2  -EJ     To walk in hospital room? 3  -EJ     AM-PAC 6 Clicks Score (PT) 17  -EJ     Highest level of mobility 5 --> Static standing  -EJ       Row Name 10/04/23 1516          Functional Assessment    Outcome Measure Options AM-PAC 6 Clicks Basic Mobility (PT)  -               User Key  (r) = Recorded By, (t) = Taken By, (c) = Cosigned By      Initials Name Provider Type    Henny Rios, PT Physical Therapist                                 Physical Therapy Education       Title: PT OT SLP Therapies (Done)       Topic: Physical Therapy (Done)       Point: Mobility training (Done)       Learning Progress Summary             Patient Acceptance, E,TB,D, VU,NR by  at 10/4/2023 1517                         Point: Home exercise program (Done)       Learning Progress Summary             Patient Acceptance, E,TB,D, VU,NR by  at 10/4/2023 1517                         Point: Body mechanics (Done)       Learning Progress Summary             Patient Acceptance, E,TB,D, VU,NR by  at 10/4/2023 1517                         Point: Precautions (Done)       Learning Progress Summary             Patient Acceptance, E,TB,D, VU,NR by  at 10/4/2023 1517                                         User Key       Initials Effective Dates Name Provider Type [facility]  06/16/21 -  Henny Preciado, PT Physical Therapist PT                  PT Recommendation and Plan  Planned Therapy Interventions (PT): balance training, bed mobility training, gait training, home exercise program, stretching, strengthening, stair training, ROM (range of motion), patient/family education, transfer training  Plan of Care Reviewed With: patient  Progress: improving  Outcome Evaluation: Pt seen for PT eval this am. He is POD 1 CABG x 1 and AVR. He presents w expected post op pain, weakness, and decreased functional mobility. At baseline, pt is independent w mobility and ADLs. He lives at home w his wife. Today, pt up in chair upon entry to room. He is able to stand w CGA/min A x 2. He ambulated approx 80 ft w HHA/CGA x 2. No overt unsteadiness noted. Mild c/o dizziness when up. Pt back in chair at end of session w all lines intact. He plans upon DC when appropriate and will continue to benefit from skilled PT to maximize safety , strength, and independence w mobility.     Time Calculation:         PT Charges       Row Name 10/04/23 1519             Time Calculation    Start Time 0907  -EJ      Stop Time 0924  -EJ      Time Calculation (min) 17 min  -EJ      PT Received On 10/04/23  -EJ      PT - Next Appointment 10/05/23  -EJ      PT Goal Re-Cert Due Date 10/11/23  -EJ         Time Calculation- PT    Total Timed Code Minutes- PT 10 minute(s)  -EJ                User Key  (r) = Recorded By, (t) = Taken By, (c) = Cosigned By      Initials Name Provider Type    EJ Henny Preciado, PT Physical Therapist                  Therapy Charges for Today       Code Description Service Date Service Provider Modifiers Qty    72145906209 HC PT EVAL MOD COMPLEXITY 3 10/4/2023 Henny Preciado, PT GP 1    60458519622 HC PT THER PROC EA 15 MIN 10/4/2023 Henny Preciado, PT GP 1    07795004821 HC PT THER SUPP EA 15 MIN 10/4/2023 Henny Preciado, PT GP 1            PT G-Codes  Outcome Measure Options: AM-PAC 6  Clicks Basic Mobility (PT)  AM-PAC 6 Clicks Score (PT): 17  PT Discharge Summary  Anticipated Discharge Disposition (PT): home with assist, home with home health    Henny Preciado, PT  10/4/2023

## 2023-10-04 NOTE — PROGRESS NOTES
" LOS: 1 day   Patient Care Team:  Jhon Alvarez MD as PCP - General (Internal Medicine)  Nash Grey MD as Consulting Physician (Cardiology)    Chief Complaint: post op    Subjective      Vital Signs  Temp:  [96.1 °F (35.6 °C)-99.7 °F (37.6 °C)] 99 °F (37.2 °C)  Heart Rate:  [64-89] 89  Resp:  [13-18] 16  BP: ()/(57-74) 109/71  FiO2 (%):  [100 %] 100 %  Body mass index is 27.52 kg/m².    Intake/Output Summary (Last 24 hours) at 10/4/2023 0745  Last data filed at 10/4/2023 0600  Gross per 24 hour   Intake 4026.23 ml   Output 4055 ml   Net -28.77 ml     No intake/output data recorded.    Chest tube drainage last 8 hours 30/90        10/04/23  0500   Weight: 87 kg (191 lb 12.8 oz)         Objective:  Vital signs: (most recent): Blood pressure 109/71, pulse 89, temperature 99 °F (37.2 °C), temperature source Core, resp. rate 16, height 177.8 cm (70\"), weight 87 kg (191 lb 12.8 oz), SpO2 98 %.              Results Review:        WBC WBC   Date Value Ref Range Status   10/04/2023 7.33 3.40 - 10.80 10*3/mm3 Final   10/03/2023 9.80 3.40 - 10.80 10*3/mm3 Final   10/03/2023 13.12 (H) 3.40 - 10.80 10*3/mm3 Final      HGB Hemoglobin   Date Value Ref Range Status   10/04/2023 10.2 (L) 13.0 - 17.7 g/dL Final   10/03/2023 11.8 (L) 13.0 - 17.7 g/dL Final   10/03/2023 12.5 (L) 13.0 - 17.7 g/dL Final   10/03/2023 10.5 (L) 12.0 - 17.0 g/dL Final   10/03/2023 9.9 (L) 12.0 - 17.0 g/dL Final   10/03/2023 9.5 (L) 12.0 - 17.0 g/dL Final   10/03/2023 9.2 (L) 12.0 - 17.0 g/dL Final   10/03/2023 8.8 (L) 12.0 - 17.0 g/dL Final   10/03/2023 12.9 12.0 - 17.0 g/dL Final      HCT Hematocrit   Date Value Ref Range Status   10/04/2023 30.9 (L) 37.5 - 51.0 % Final   10/03/2023 35.9 (L) 37.5 - 51.0 % Final   10/03/2023 38.0 37.5 - 51.0 % Final   10/03/2023 31 (L) 38 - 51 % Final   10/03/2023 29 (L) 38 - 51 % Final   10/03/2023 28 (L) 38 - 51 % Final   10/03/2023 27 (L) 38 - 51 % Final   10/03/2023 26 (L) 38 - 51 % Final   10/03/2023 38 38 - 51 " % Final      Platelets Platelets   Date Value Ref Range Status   10/04/2023 99 (L) 140 - 450 10*3/mm3 Final   10/03/2023 117 (L) 140 - 450 10*3/mm3 Final   10/03/2023 131 (L) 140 - 450 10*3/mm3 Final        PT/INR:    Protime   Date Value Ref Range Status   10/04/2023 15.8 (H) 11.7 - 14.2 Seconds Final   10/03/2023 16.2 (H) 11.7 - 14.2 Seconds Final   /  INR   Date Value Ref Range Status   10/04/2023 1.24 (H) 0.90 - 1.10 Final   10/03/2023 1.29 (H) 0.90 - 1.10 Final       Sodium Sodium   Date Value Ref Range Status   10/04/2023 142 136 - 145 mmol/L Final   10/03/2023 142 136 - 145 mmol/L Final   10/03/2023 139 136 - 145 mmol/L Final      Potassium Potassium   Date Value Ref Range Status   10/04/2023 3.9 3.5 - 5.2 mmol/L Final   10/03/2023 4.2 3.5 - 5.2 mmol/L Final   10/03/2023 4.1 3.5 - 5.2 mmol/L Final     Comment:     Slight hemolysis detected by analyzer. Results may be affected.      Chloride Chloride   Date Value Ref Range Status   10/04/2023 108 (H) 98 - 107 mmol/L Final   10/03/2023 108 (H) 98 - 107 mmol/L Final   10/03/2023 106 98 - 107 mmol/L Final      Bicarbonate CO2   Date Value Ref Range Status   10/04/2023 24.5 22.0 - 29.0 mmol/L Final   10/03/2023 23.7 22.0 - 29.0 mmol/L Final   10/03/2023 23.1 22.0 - 29.0 mmol/L Final      BUN BUN   Date Value Ref Range Status   10/04/2023 15 8 - 23 mg/dL Final   10/03/2023 18 8 - 23 mg/dL Final   10/03/2023 20 8 - 23 mg/dL Final      Creatinine Creatinine   Date Value Ref Range Status   10/04/2023 0.83 0.76 - 1.27 mg/dL Final   10/03/2023 0.99 0.76 - 1.27 mg/dL Final   10/03/2023 1.18 0.76 - 1.27 mg/dL Final      Calcium Calcium   Date Value Ref Range Status   10/04/2023 8.5 (L) 8.6 - 10.5 mg/dL Final   10/03/2023 8.5 (L) 8.6 - 10.5 mg/dL Final   10/03/2023 9.2 8.6 - 10.5 mg/dL Final      Magnesium Magnesium   Date Value Ref Range Status   10/04/2023 2.6 (H) 1.6 - 2.4 mg/dL Final   10/03/2023 2.4 1.6 - 2.4 mg/dL Final   10/03/2023 3.1 (H) 1.6 - 2.4 mg/dL Final           aspirin, 81 mg, Oral, Daily  atorvastatin, 40 mg, Oral, Nightly  ceFAZolin, 2,000 mg, Intravenous, Q8H  chlorhexidine, 15 mL, Mouth/Throat, Q12H  enoxaparin, 40 mg, Subcutaneous, Daily  magnesium sulfate, 2 g, Intravenous, Q8H  metoclopramide, 10 mg, Intravenous, Q6H  metoprolol tartrate, 12.5 mg, Oral, Q12H  mupirocin, , Each Nare, BID  pantoprazole, 40 mg, Oral, QAM  senna-docusate sodium, 2 tablet, Oral, Nightly      clevidipine, 2-32 mg/hr  dexmedetomidine, 0.2-1.5 mcg/kg/hr, Last Rate: 0.2 mcg/kg/hr (10/03/23 1723)  DOPamine, 2-20 mcg/kg/min  EPINEPHrine, 0.02-0.1 mcg/kg/min  insulin, 0-100 Units/hr, Last Rate: 0.5 Units/hr (10/04/23 0500)  milrinone, 0.25-0.375 mcg/kg/min  niCARdipine, 5-15 mg/hr, Last Rate: Stopped (10/03/23 1438)  nitroglycerin, 5-200 mcg/min  norepinephrine, 0.02-0.2 mcg/kg/min  phenylephrine, 0.2-2 mcg/kg/min  propofol, 5-50 mcg/kg/min, Last Rate: Stopped (10/03/23 1424)  sodium chloride, 30 mL/hr, Last Rate: 30 mL/hr (10/03/23 1130)              Ascending aortic aneurysm    Aortic stenosis due to bicuspid aortic valve      Assessment & Plan    - severe bicuspid aortic valve stenosis- s/p AVR (tissue), CABGx1 LIMA POD#1 Pagni  - ascending aortic aneurysm--4.7 cm per CTA  - severe single vessel CAD--70% LAD  - hypertension  - hyperlipidemia--statin therapy  - DM II--A1c 6.1  -post op anemia-expected acute blood loss     Looks this morning  Up in the chair  4L NC-- wean as able  Sinus rhythm rate 70s  Encourage pulmonary toilet-- add mucinex and flutter  Mobilize  Discontinue swan and steph  Transfer to stepdown      Nereida Shannon, CLEO  10/04/23  07:45 EDT

## 2023-10-05 ENCOUNTER — APPOINTMENT (OUTPATIENT)
Dept: GENERAL RADIOLOGY | Facility: HOSPITAL | Age: 73
DRG: 221 | End: 2023-10-05
Payer: MEDICARE

## 2023-10-05 LAB
ANION GAP SERPL CALCULATED.3IONS-SCNC: 8 MMOL/L (ref 5–15)
BH BB BLOOD EXPIRATION DATE: NORMAL
BH BB BLOOD EXPIRATION DATE: NORMAL
BH BB BLOOD TYPE BARCODE: 5100
BH BB BLOOD TYPE BARCODE: 5100
BH BB DISPENSE STATUS: NORMAL
BH BB DISPENSE STATUS: NORMAL
BH BB PRODUCT CODE: NORMAL
BH BB PRODUCT CODE: NORMAL
BH BB UNIT NUMBER: NORMAL
BH BB UNIT NUMBER: NORMAL
BUN SERPL-MCNC: 12 MG/DL (ref 8–23)
BUN/CREAT SERPL: 16 (ref 7–25)
CALCIUM SPEC-SCNC: 8.6 MG/DL (ref 8.6–10.5)
CHLORIDE SERPL-SCNC: 104 MMOL/L (ref 98–107)
CO2 SERPL-SCNC: 26 MMOL/L (ref 22–29)
CREAT SERPL-MCNC: 0.75 MG/DL (ref 0.76–1.27)
CROSSMATCH INTERPRETATION: NORMAL
CROSSMATCH INTERPRETATION: NORMAL
DEPRECATED RDW RBC AUTO: 43.6 FL (ref 37–54)
EGFRCR SERPLBLD CKD-EPI 2021: 95.3 ML/MIN/1.73
ERYTHROCYTE [DISTWIDTH] IN BLOOD BY AUTOMATED COUNT: 14.3 % (ref 12.3–15.4)
GLUCOSE BLDC GLUCOMTR-MCNC: 137 MG/DL (ref 70–130)
GLUCOSE BLDC GLUCOMTR-MCNC: 182 MG/DL (ref 70–130)
GLUCOSE BLDC GLUCOMTR-MCNC: 190 MG/DL (ref 70–130)
GLUCOSE BLDC GLUCOMTR-MCNC: 194 MG/DL (ref 70–130)
GLUCOSE SERPL-MCNC: 146 MG/DL (ref 65–99)
HCT VFR BLD AUTO: 31.3 % (ref 37.5–51)
HGB BLD-MCNC: 10.2 G/DL (ref 13–17.7)
LAB AP CASE REPORT: NORMAL
MCH RBC QN AUTO: 27.3 PG (ref 26.6–33)
MCHC RBC AUTO-ENTMCNC: 32.6 G/DL (ref 31.5–35.7)
MCV RBC AUTO: 83.9 FL (ref 79–97)
PATH REPORT.FINAL DX SPEC: NORMAL
PATH REPORT.GROSS SPEC: NORMAL
PLATELET # BLD AUTO: 99 10*3/MM3 (ref 140–450)
PMV BLD AUTO: 10 FL (ref 6–12)
POTASSIUM SERPL-SCNC: 4.2 MMOL/L (ref 3.5–5.2)
QT INTERVAL: 344 MS
QT INTERVAL: 364 MS
QTC INTERVAL: 361 MS
QTC INTERVAL: 405 MS
RBC # BLD AUTO: 3.73 10*6/MM3 (ref 4.14–5.8)
SODIUM SERPL-SCNC: 138 MMOL/L (ref 136–145)
UNIT  ABO: NORMAL
UNIT  ABO: NORMAL
UNIT  RH: NORMAL
UNIT  RH: NORMAL
WBC NRBC COR # BLD: 8.03 10*3/MM3 (ref 3.4–10.8)

## 2023-10-05 PROCEDURE — 99232 SBSQ HOSP IP/OBS MODERATE 35: CPT | Performed by: NURSE PRACTITIONER

## 2023-10-05 PROCEDURE — 97530 THERAPEUTIC ACTIVITIES: CPT

## 2023-10-05 PROCEDURE — 94799 UNLISTED PULMONARY SVC/PX: CPT

## 2023-10-05 PROCEDURE — 85027 COMPLETE CBC AUTOMATED: CPT | Performed by: NURSE PRACTITIONER

## 2023-10-05 PROCEDURE — 63710000001 INSULIN LISPRO (HUMAN) PER 5 UNITS: Performed by: NURSE PRACTITIONER

## 2023-10-05 PROCEDURE — 93010 ELECTROCARDIOGRAM REPORT: CPT | Performed by: INTERNAL MEDICINE

## 2023-10-05 PROCEDURE — 80048 BASIC METABOLIC PNL TOTAL CA: CPT | Performed by: NURSE PRACTITIONER

## 2023-10-05 PROCEDURE — 82948 REAGENT STRIP/BLOOD GLUCOSE: CPT

## 2023-10-05 PROCEDURE — 25010000002 CEFAZOLIN IN DEXTROSE 2-4 GM/100ML-% SOLUTION: Performed by: NURSE PRACTITIONER

## 2023-10-05 PROCEDURE — 25010000002 FUROSEMIDE PER 20 MG: Performed by: NURSE PRACTITIONER

## 2023-10-05 PROCEDURE — 99024 POSTOP FOLLOW-UP VISIT: CPT | Performed by: THORACIC SURGERY (CARDIOTHORACIC VASCULAR SURGERY)

## 2023-10-05 PROCEDURE — 71045 X-RAY EXAM CHEST 1 VIEW: CPT

## 2023-10-05 PROCEDURE — 93005 ELECTROCARDIOGRAM TRACING: CPT | Performed by: NURSE PRACTITIONER

## 2023-10-05 RX ORDER — POTASSIUM CHLORIDE 750 MG/1
20 TABLET, FILM COATED, EXTENDED RELEASE ORAL ONCE
Status: COMPLETED | OUTPATIENT
Start: 2023-10-05 | End: 2023-10-05

## 2023-10-05 RX ORDER — DOCUSATE SODIUM 100 MG/1
100 CAPSULE, LIQUID FILLED ORAL 2 TIMES DAILY
Status: DISCONTINUED | OUTPATIENT
Start: 2023-10-05 | End: 2023-10-07 | Stop reason: HOSPADM

## 2023-10-05 RX ORDER — POLYETHYLENE GLYCOL 3350 17 G/17G
17 POWDER, FOR SOLUTION ORAL DAILY
Status: DISCONTINUED | OUTPATIENT
Start: 2023-10-05 | End: 2023-10-07 | Stop reason: HOSPADM

## 2023-10-05 RX ORDER — FUROSEMIDE 10 MG/ML
40 INJECTION INTRAMUSCULAR; INTRAVENOUS ONCE
Status: COMPLETED | OUTPATIENT
Start: 2023-10-05 | End: 2023-10-05

## 2023-10-05 RX ADMIN — 0.12% CHLORHEXIDINE GLUCONATE 15 ML: 1.2 RINSE ORAL at 20:37

## 2023-10-05 RX ADMIN — CYCLOBENZAPRINE 10 MG: 10 TABLET, FILM COATED ORAL at 20:37

## 2023-10-05 RX ADMIN — CEFAZOLIN SODIUM 2000 MG: 2 INJECTION, SOLUTION INTRAVENOUS at 02:09

## 2023-10-05 RX ADMIN — ATORVASTATIN CALCIUM 40 MG: 20 TABLET, FILM COATED ORAL at 20:38

## 2023-10-05 RX ADMIN — GUAIFENESIN 1200 MG: 600 TABLET, EXTENDED RELEASE ORAL at 20:38

## 2023-10-05 RX ADMIN — METOPROLOL TARTRATE 25 MG: 25 TABLET, FILM COATED ORAL at 20:37

## 2023-10-05 RX ADMIN — ASPIRIN 81 MG: 81 TABLET, COATED ORAL at 09:37

## 2023-10-05 RX ADMIN — PANTOPRAZOLE SODIUM 40 MG: 40 TABLET, DELAYED RELEASE ORAL at 06:53

## 2023-10-05 RX ADMIN — GUAIFENESIN 1200 MG: 600 TABLET, EXTENDED RELEASE ORAL at 09:37

## 2023-10-05 RX ADMIN — DOCUSATE SODIUM 100 MG: 100 CAPSULE, LIQUID FILLED ORAL at 09:37

## 2023-10-05 RX ADMIN — HYDROCODONE BITARTRATE AND ACETAMINOPHEN 2 TABLET: 5; 325 TABLET ORAL at 02:09

## 2023-10-05 RX ADMIN — INSULIN LISPRO 2 UNITS: 100 INJECTION, SOLUTION INTRAVENOUS; SUBCUTANEOUS at 07:22

## 2023-10-05 RX ADMIN — MUPIROCIN 1 APPLICATION: 20 OINTMENT TOPICAL at 09:37

## 2023-10-05 RX ADMIN — INSULIN LISPRO 2 UNITS: 100 INJECTION, SOLUTION INTRAVENOUS; SUBCUTANEOUS at 11:53

## 2023-10-05 RX ADMIN — INSULIN LISPRO 2 UNITS: 100 INJECTION, SOLUTION INTRAVENOUS; SUBCUTANEOUS at 20:37

## 2023-10-05 RX ADMIN — FUROSEMIDE 40 MG: 10 INJECTION, SOLUTION INTRAMUSCULAR; INTRAVENOUS at 09:38

## 2023-10-05 RX ADMIN — MUPIROCIN 1 APPLICATION: 20 OINTMENT TOPICAL at 20:37

## 2023-10-05 RX ADMIN — DOCUSATE SODIUM 100 MG: 100 CAPSULE, LIQUID FILLED ORAL at 20:37

## 2023-10-05 RX ADMIN — POTASSIUM CHLORIDE 20 MEQ: 750 TABLET, EXTENDED RELEASE ORAL at 09:38

## 2023-10-05 RX ADMIN — HYDROCODONE BITARTRATE AND ACETAMINOPHEN 1 TABLET: 5; 325 TABLET ORAL at 20:37

## 2023-10-05 RX ADMIN — SENNOSIDES AND DOCUSATE SODIUM 2 TABLET: 50; 8.6 TABLET ORAL at 20:38

## 2023-10-05 RX ADMIN — METOPROLOL TARTRATE 25 MG: 25 TABLET, FILM COATED ORAL at 09:37

## 2023-10-05 RX ADMIN — HYDROCODONE BITARTRATE AND ACETAMINOPHEN 2 TABLET: 5; 325 TABLET ORAL at 06:53

## 2023-10-05 NOTE — PLAN OF CARE
Goal Outcome Evaluation:  Plan of Care Reviewed With: patient           Outcome Evaluation: Pt in supine, awake and alert, agreeable to therapy. Patient reports he has no pain at rest. Patient performed bed mobility with Matteo, sit to stand with CGA, ambulated around nurses unit with CGA. Patient improving with mobility, steady with ambulation and increased activity tolerance. PT will continue to follow and progress as tolerated.

## 2023-10-05 NOTE — PLAN OF CARE
Goal Outcome Evaluation:           Progress: improving  Outcome Evaluation: Patient getting some rest tonight. Pain is tolerable with ordered prn's. Patient is impulsive and confused at times. Bed alarm on and patient's wife at bedside. Drain outputs remain low. Jarvis still in place. Placed on 2lpm at night for desaturation into the high 80's. Patient denies needs at this time. WCTM.         Problem: Adult Inpatient Plan of Care  Goal: Plan of Care Review  Outcome: Ongoing, Progressing  Flowsheets  Taken 10/5/2023 0024 by Adrien Perez RN  Progress: improving  Outcome Evaluation: Patient getting some rest tonight. Pain is tolerable with ordered prn's. Patient is impulsive and confused at times. Bed alarm on and patient's wife at bedside. Drain outputs remain low. Jarvis still in place. Placed on 2lpm at night for desaturation into the high 80's. Patient denies needs at this time. WCTM.  Taken 10/4/2023 1511 by Henny Preciado PT  Plan of Care Reviewed With: patient  Goal: Patient-Specific Goal (Individualized)  Outcome: Ongoing, Progressing  Goal: Absence of Hospital-Acquired Illness or Injury  Outcome: Ongoing, Progressing  Intervention: Identify and Manage Fall Risk  Recent Flowsheet Documentation  Taken 10/5/2023 0000 by Adrien Perez RN  Safety Promotion/Fall Prevention:   activity supervised   fall prevention program maintained   safety round/check completed  Taken 10/4/2023 2200 by Adrien Perez RN  Safety Promotion/Fall Prevention:   activity supervised   fall prevention program maintained   nonskid shoes/slippers when out of bed   safety round/check completed  Taken 10/4/2023 2000 by Adrien Perez RN  Safety Promotion/Fall Prevention:   activity supervised   fall prevention program maintained   nonskid shoes/slippers when out of bed   safety round/check completed  Intervention: Prevent Skin Injury  Recent Flowsheet Documentation  Taken 10/5/2023 0000 by Adrien Perez RN  Body  Position:   supine, legs elevated   patient/family refused   position changed independently  Taken 10/4/2023 2200 by Adrien Perez RN  Body Position:   supine, legs elevated   position changed independently   tilted   left  Taken 10/4/2023 2000 by Adrien Perez RN  Body Position:   supine, legs elevated   position changed independently  Intervention: Prevent and Manage VTE (Venous Thromboembolism) Risk  Recent Flowsheet Documentation  Taken 10/5/2023 0000 by Adrien Perez RN  VTE Prevention/Management:   bilateral   compression stockings on  Taken 10/4/2023 2000 by Adrien Perez RN  Activity Management: back to bed  VTE Prevention/Management:   bilateral   compression stockings on  Intervention: Prevent Infection  Recent Flowsheet Documentation  Taken 10/5/2023 0000 by Adrien Perez RN  Infection Prevention: rest/sleep promoted  Taken 10/4/2023 2200 by Adrien Perez RN  Infection Prevention: rest/sleep promoted  Taken 10/4/2023 2000 by Adrien Perez RN  Infection Prevention: rest/sleep promoted  Goal: Optimal Comfort and Wellbeing  Outcome: Ongoing, Progressing  Intervention: Monitor Pain and Promote Comfort  Recent Flowsheet Documentation  Taken 10/5/2023 0000 by Adrien Perez RN  Pain Management Interventions:   pillow support provided   position adjusted  Taken 10/4/2023 2000 by Adrien Perez RN  Pain Management Interventions:   see MAR   pillow support provided   position adjusted  Intervention: Provide Person-Centered Care  Recent Flowsheet Documentation  Taken 10/5/2023 0000 by Adrien Perez RN  Trust Relationship/Rapport:   empathic listening provided   questions answered  Taken 10/4/2023 2000 by Adrien Perez RN  Trust Relationship/Rapport:   care explained   choices provided   emotional support provided   empathic listening provided   questions answered   questions encouraged   reassurance provided   thoughts/feelings acknowledged  Goal: Readiness for  Transition of Care  Outcome: Ongoing, Progressing     Problem: Fall Injury Risk  Goal: Absence of Fall and Fall-Related Injury  Outcome: Ongoing, Progressing  Intervention: Identify and Manage Contributors  Recent Flowsheet Documentation  Taken 10/5/2023 0000 by Adrien Perez RN  Medication Review/Management: medications reviewed  Taken 10/4/2023 2200 by Adrien Perez RN  Medication Review/Management: medications reviewed  Taken 10/4/2023 2000 by Adrien Perez RN  Medication Review/Management: medications reviewed  Intervention: Promote Injury-Free Environment  Recent Flowsheet Documentation  Taken 10/5/2023 0000 by Adrien Perez RN  Safety Promotion/Fall Prevention:   activity supervised   fall prevention program maintained   safety round/check completed  Taken 10/4/2023 2200 by Adrien Perez RN  Safety Promotion/Fall Prevention:   activity supervised   fall prevention program maintained   nonskid shoes/slippers when out of bed   safety round/check completed  Taken 10/4/2023 2000 by Adrien Perez RN  Safety Promotion/Fall Prevention:   activity supervised   fall prevention program maintained   nonskid shoes/slippers when out of bed   safety round/check completed     Problem: Skin Injury Risk Increased  Goal: Skin Health and Integrity  Outcome: Ongoing, Progressing  Intervention: Optimize Skin Protection  Recent Flowsheet Documentation  Taken 10/5/2023 0000 by Adrien Perez RN  Head of Bed (HOB) Positioning: HOB at 30 degrees  Taken 10/4/2023 2200 by Adrien Perez RN  Head of Bed (HOB) Positioning: HOB at 30 degrees  Taken 10/4/2023 2000 by Adrien Perez RN  Head of Bed (HOB) Positioning: HOB at 30 degrees

## 2023-10-05 NOTE — PLAN OF CARE
Goal Outcome Evaluation:  Plan of Care Reviewed With: patient, spouse        Progress: improving  Outcome Evaluation: Right IJ removed. Chest tubes still in place. No air leak. Patient got back from bathroom unassisted. Educated patient on falls risk. 2L nasal cannula. Wires in place. Overriding pacer. NO bm yet. Colace given.

## 2023-10-05 NOTE — PROGRESS NOTES
" LOS: 2 days   Patient Care Team:  Jhon Alvarez MD as PCP - General (Internal Medicine)  Nash Grey MD as Consulting Physician (Cardiology)    Chief Complaint: post op    Subjective      Vital Signs  Temp:  [98.7 °F (37.1 °C)-99.7 °F (37.6 °C)] 99.2 °F (37.3 °C)  Heart Rate:  [69-86] 85  Resp:  [16-18] 16  BP: (113-138)/(65-77) 132/66  Body mass index is 26 kg/m².    Intake/Output Summary (Last 24 hours) at 10/5/2023 0753  Last data filed at 10/5/2023 0400  Gross per 24 hour   Intake 685 ml   Output 1340 ml   Net -655 ml       No intake/output data recorded.    Chest tube drainage last 8 hours 110/20        10/04/23  0500 10/05/23  0645   Weight: 87 kg (191 lb 12.8 oz) 82.2 kg (181 lb 3.2 oz)         Objective:  Vital signs: (most recent): Blood pressure 115/71, pulse 86, temperature 97.5 °F (36.4 °C), temperature source Oral, resp. rate 17, height 177.8 cm (70\"), weight 82.2 kg (181 lb 3.2 oz), SpO2 93 %.              Results Review:        WBC WBC   Date Value Ref Range Status   10/05/2023 8.03 3.40 - 10.80 10*3/mm3 Final   10/04/2023 7.33 3.40 - 10.80 10*3/mm3 Final   10/03/2023 9.80 3.40 - 10.80 10*3/mm3 Final   10/03/2023 13.12 (H) 3.40 - 10.80 10*3/mm3 Final      HGB Hemoglobin   Date Value Ref Range Status   10/05/2023 10.2 (L) 13.0 - 17.7 g/dL Final   10/04/2023 10.2 (L) 13.0 - 17.7 g/dL Final   10/03/2023 11.8 (L) 13.0 - 17.7 g/dL Final   10/03/2023 12.5 (L) 13.0 - 17.7 g/dL Final   10/03/2023 10.5 (L) 12.0 - 17.0 g/dL Final   10/03/2023 9.9 (L) 12.0 - 17.0 g/dL Final   10/03/2023 9.5 (L) 12.0 - 17.0 g/dL Final   10/03/2023 9.2 (L) 12.0 - 17.0 g/dL Final   10/03/2023 8.8 (L) 12.0 - 17.0 g/dL Final   10/03/2023 12.9 12.0 - 17.0 g/dL Final      HCT Hematocrit   Date Value Ref Range Status   10/05/2023 31.3 (L) 37.5 - 51.0 % Final   10/04/2023 30.9 (L) 37.5 - 51.0 % Final   10/03/2023 35.9 (L) 37.5 - 51.0 % Final   10/03/2023 38.0 37.5 - 51.0 % Final   10/03/2023 31 (L) 38 - 51 % Final   10/03/2023 29 (L) " 38 - 51 % Final   10/03/2023 28 (L) 38 - 51 % Final   10/03/2023 27 (L) 38 - 51 % Final   10/03/2023 26 (L) 38 - 51 % Final   10/03/2023 38 38 - 51 % Final      Platelets Platelets   Date Value Ref Range Status   10/05/2023 99 (L) 140 - 450 10*3/mm3 Final   10/04/2023 99 (L) 140 - 450 10*3/mm3 Final   10/03/2023 117 (L) 140 - 450 10*3/mm3 Final   10/03/2023 131 (L) 140 - 450 10*3/mm3 Final        PT/INR:    Protime   Date Value Ref Range Status   10/04/2023 15.8 (H) 11.7 - 14.2 Seconds Final   10/03/2023 16.2 (H) 11.7 - 14.2 Seconds Final   /  INR   Date Value Ref Range Status   10/04/2023 1.24 (H) 0.90 - 1.10 Final   10/03/2023 1.29 (H) 0.90 - 1.10 Final       Sodium Sodium   Date Value Ref Range Status   10/05/2023 138 136 - 145 mmol/L Final   10/04/2023 142 136 - 145 mmol/L Final   10/03/2023 142 136 - 145 mmol/L Final   10/03/2023 139 136 - 145 mmol/L Final      Potassium Potassium   Date Value Ref Range Status   10/05/2023 4.2 3.5 - 5.2 mmol/L Final   10/04/2023 3.9 3.5 - 5.2 mmol/L Final   10/03/2023 4.2 3.5 - 5.2 mmol/L Final   10/03/2023 4.1 3.5 - 5.2 mmol/L Final     Comment:     Slight hemolysis detected by analyzer. Results may be affected.      Chloride Chloride   Date Value Ref Range Status   10/05/2023 104 98 - 107 mmol/L Final   10/04/2023 108 (H) 98 - 107 mmol/L Final   10/03/2023 108 (H) 98 - 107 mmol/L Final   10/03/2023 106 98 - 107 mmol/L Final      Bicarbonate CO2   Date Value Ref Range Status   10/05/2023 26.0 22.0 - 29.0 mmol/L Final   10/04/2023 24.5 22.0 - 29.0 mmol/L Final   10/03/2023 23.7 22.0 - 29.0 mmol/L Final   10/03/2023 23.1 22.0 - 29.0 mmol/L Final      BUN BUN   Date Value Ref Range Status   10/05/2023 12 8 - 23 mg/dL Final   10/04/2023 15 8 - 23 mg/dL Final   10/03/2023 18 8 - 23 mg/dL Final   10/03/2023 20 8 - 23 mg/dL Final      Creatinine Creatinine   Date Value Ref Range Status   10/05/2023 0.75 (L) 0.76 - 1.27 mg/dL Final   10/04/2023 0.83 0.76 - 1.27 mg/dL Final    10/03/2023 0.99 0.76 - 1.27 mg/dL Final   10/03/2023 1.18 0.76 - 1.27 mg/dL Final      Calcium Calcium   Date Value Ref Range Status   10/05/2023 8.6 8.6 - 10.5 mg/dL Final   10/04/2023 8.5 (L) 8.6 - 10.5 mg/dL Final   10/03/2023 8.5 (L) 8.6 - 10.5 mg/dL Final   10/03/2023 9.2 8.6 - 10.5 mg/dL Final      Magnesium Magnesium   Date Value Ref Range Status   10/04/2023 2.6 (H) 1.6 - 2.4 mg/dL Final   10/03/2023 2.4 1.6 - 2.4 mg/dL Final   10/03/2023 3.1 (H) 1.6 - 2.4 mg/dL Final          aspirin, 81 mg, Oral, Daily  atorvastatin, 40 mg, Oral, Nightly  chlorhexidine, 15 mL, Mouth/Throat, Q12H  enoxaparin, 40 mg, Subcutaneous, Daily  guaiFENesin, 1,200 mg, Oral, Q12H  insulin lispro, 2-9 Units, Subcutaneous, 4x Daily AC & at Bedtime  metoprolol tartrate, 12.5 mg, Oral, Q12H  mupirocin, , Each Nare, BID  pantoprazole, 40 mg, Oral, QAM  senna-docusate sodium, 2 tablet, Oral, Nightly      sodium chloride, 30 mL/hr, Last Rate: 30 mL/hr (10/03/23 1130)              Ascending aortic aneurysm    Aortic stenosis due to bicuspid aortic valve      Assessment & Plan    - severe bicuspid aortic valve stenosis- s/p AVR (tissue), CABGx1 LIMA POD#2 Pagni  - ascending aortic aneurysm--4.7 cm per CTA  - severe single vessel CAD--70% LAD  - hypertension  - hyperlipidemia--statin therapy  - DM II--A1c 6.1  -post op anemia-expected acute blood loss     Looks this morning  Up in the chair  2L NC-- wean as able  Sinus rhythm rate 80s-- tolerated increase in beta blocker  Encourage pulmonary toilet-- continue mucinex and flutter  IV dikarmene  Will see how much his chest tubes drain this afternoon  Discontinue central line and freire  Continue routine care     Nereida Shannon, CLEO  10/05/23  07:53 EDT

## 2023-10-05 NOTE — THERAPY TREATMENT NOTE
Patient Name: Jeremi Shankar .  : 1950    MRN: [id number]                              Today's Date: 10/5/2023       Admit Date: 10/3/2023    Visit Dx:     ICD-10-CM ICD-9-CM   1. S/P CABG (coronary artery bypass graft)  Z95.1 V45.81   2. Aortic stenosis due to bicuspid aortic valve  Q23.0 746.3    Q23.1 746.4   3. Nonrheumatic aortic (valve) stenosis  I35.0 424.1     Patient Active Problem List   Diagnosis    History of colon polyps    Primary hypertension    Prediabetes    Hyperlipidemia    Peyronie's disease    Aortic stenosis due to bicuspid aortic valve    Aneurysm of ascending aorta without rupture    Ascending aortic aneurysm     Past Medical History:   Diagnosis Date    Aortic stenosis 2023    Aortic stenosis due to bicuspid aortic valve     Arthritis     arthritis is in my hands    Benign essential hypertension     Bilateral carpal tunnel syndrome 2020    Cataract     Eye doctor saw them in exam    CHF (congestive heart failure)     Colon polyp  or     Diverticulosis of large intestine 2016    Description: 2015????????colonoscopy????????Dr. Francis????????diverticulosis/colon polyps (2).    Headache 1965    History of transfusion     Hyperlipidemia     Type 2 diabetes mellitus     Visual impairment     glasses for reading     Past Surgical History:   Procedure Laterality Date    ASCENDING AORTIC ANEURYSM REPAIR W/ MECHANICAL AORTIC VALVE REPLACEMENT N/A 10/3/2023    Procedure: INTRAOPERATIVE RAYRAY; STERNOTOMY; CORONARY ARTERY BYPASS GRAFT TIMES ONE USING LEFT INTERNAL MAMMARY ARTERY; AORTIC VALVE REPLACEMENT; NEURO MONITORING AND PRP.;  Surgeon: Benton Mcduffie MD;  Location: [facility] CVOR;  Service: Cardiothoracic;  Laterality: N/A;    CARDIAC CATHETERIZATION N/A 2023    Procedure: Right Heart Cath;  Surgeon: Kun Desai MD;  Location: [facility] CATH INVASIVE LOCATION;  Service: Cardiovascular;  Laterality: N/A;    CARDIAC CATHETERIZATION N/A  8/8/2023    Procedure: Coronary angiography;  Surgeon: Kun Desai MD;  Location: [facility] INVASIVE LOCATION;  Service: Cardiovascular;  Laterality: N/A;    CARPAL TUNNEL RELEASE Left 05/2022    COLONOSCOPY  2007    Dr Francis --WNL.  March 2015â€“Dr. Hernandez€“colon polyps (2); diverticulosis.    COLONOSCOPY  2014?    polyps--DR Francis    HERNIA REPAIR      (R) REPAIR TWICE      General Information       Row Name 10/05/23 1532          Physical Therapy Time and Intention    Document Type therapy note (daily note)  -EM     Mode of Treatment individual therapy;physical therapy  -EM       Row Name 10/05/23 1532          General Information    Patient Profile Reviewed yes  -EM     Existing Precautions/Restrictions fall;cardiac;sternal  -EM               User Key  (r) = Recorded By, (t) = Taken By, (c) = Cosigned By      Initials Name Provider Type    EM Kiley Godinez PT Physical Therapist                   Mobility       Row Name 10/05/23 1532          Bed Mobility    Bed Mobility supine-sit;sit-supine  -EM     Supine-Sit [place] (Bed Mobility) minimum assist (75% patient effort);verbal cues  -EM     Sit-Supine [place] (Bed Mobility) minimum assist (75% patient effort);verbal cues  -EM     Assistive Device (Bed Mobility) head of bed elevated  -EM       Row Name 10/05/23 1532          Sit-Stand Transfer    Sit-Stand [place] (Transfers) contact guard;1 person to manage equipment  -EM       Row Name 10/05/23 1532          Gait/Stairs (Locomotion)    [place] Level (Gait) contact guard;1 person to manage equipment  -EM     Distance in Feet (Gait) 150  -EM     Deviations/Abnormal Patterns (Gait) gait speed decreased  -EM     Comment, (Gait/Stairs) steady with ambulation  -EM               User Key  (r) = Recorded By, (t) = Taken By, (c) = Cosigned By      Initials Name Provider Type    Kiley Mercado PT Physical Therapist                   Obj/Interventions       Row Name 10/05/23  1533          Motor Skills    Therapeutic Exercise other (see comments)  5 reps per cardiac protocol, level 2  -EM               User Key  (r) = Recorded By, (t) = Taken By, (c) = Cosigned By      Initials Name Provider Type    Kiley Mercado, PT Physical Therapist                   Goals/Plan    No documentation.                  Clinical Impression       Row Name 10/05/23 1533          Pain    Pretreatment Pain Rating 0/10 - no pain  -EM     Pre/Posttreatment Pain Comment no c/o pain at rest  -EM       Row Name 10/05/23 1533          Plan of Care Review    Plan of Care Reviewed With patient  -EM     Outcome Evaluation Pt in supine, awake and alert, agreeable to therapy. Patient reports he has no pain at rest. Patient performed bed mobility with Matteo, sit to stand with CGA, ambulated around nurses unit with CGA. Patient improving with mobility, steady with ambulation and increased activity tolerance. PT will continue to follow and progress as tolerated.  -EM       Row Name 10/05/23 1533          Therapy Assessment/Plan (PT)    Therapy Frequency (PT) 6 times/wk  -EM       Row Name 10/05/23 1533          Vital Signs    Pre Systolic BP Rehab 109  -EM     Pre Treatment Diastolic BP 68  -EM     Pretreatment Heart Rate (beats/min) 89  -EM     Pre SpO2 (%) 94  -EM     O2 Delivery Pre Treatment supplemental O2  -EM       Row Name 10/05/23 1533          Positioning and Restraints    Pre-Treatment Position in bed  -EM     Post Treatment Position bed  -EM     In Bed supine;call light within reach;exit alarm on;with family/caregiver  -EM               User Key  (r) = Recorded By, (t) = Taken By, (c) = Cosigned By      Initials Name Provider Type    Kiley Mercado, PT Physical Therapist                   Outcome Measures       Row Name 10/05/23 1535          How much help from another person do you currently need...    Turning from your back to your side while in flat bed without using bedrails? 3  -EM      Moving from lying on back to sitting on the side of a flat bed without bedrails? 3  -EM     Moving to and from a bed to a chair (including a wheelchair)? 3  -EM     Standing up from a chair using your arms (e.g., wheelchair, bedside chair)? 3  -EM     Climbing 3-5 steps with a railing? 3  -EM     To walk in hospital room? 3  -EM     AM-PAC 6 Clicks Score (PT) 18  -EM     Highest level of mobility 6 --> Walked 10 steps or more  -               User Key  (r) = Recorded By, (t) = Taken By, (c) = Cosigned By      Initials Name Provider Type     Kiley Godinez, PT Physical Therapist                                 Physical Therapy Education       Title: PT OT SLP Therapies (Done)       Topic: Physical Therapy (Done)       Point: Mobility training (Done)       Learning Progress Summary             Patient Acceptance, E, VU by  at 10/5/2023 1535    Acceptance, E,TB,D, VU,NR by  at 10/4/2023 1517                         Point: Home exercise program (Done)       Learning Progress Summary             Patient Acceptance, E, VU by  at 10/5/2023 1535    Acceptance, E,TB,D, VU,NR by  at 10/4/2023 1517                         Point: Body mechanics (Done)       Learning Progress Summary             Patient Acceptance, E,TB,D, VU,NR by  at 10/4/2023 1517                         Point: Precautions (Done)       Learning Progress Summary             Patient Acceptance, E,TB,D, VU,NR by  at 10/4/2023 1517                                         User Key       Initials Effective Dates Name Provider Type Discipline     06/16/21 -  Kiley Godinez, PT Physical Therapist PT     06/16/21 -  Henny Preciado, PT Physical Therapist PT                  PT Recommendation and Plan     Plan of Care Reviewed With: patient  Outcome Evaluation: Pt in supine, awake and alert, agreeable to therapy. Patient reports he has no pain at rest. Patient performed bed mobility with Matteo, sit to stand with CGA, ambulated around  nurses unit with CGA. Patient improving with mobility, steady with ambulation and increased activity tolerance. PT will continue to follow and progress as tolerated.     Time Calculation:         PT Charges       Row Name 10/05/23 1536             Time Calculation    Start Time 1439  -EM      Stop Time 1452  -EM      Time Calculation (min) 13 min  -EM      PT Received On 10/05/23  -EM      PT - Next Appointment 10/06/23  -EM         Time Calculation- PT    Total Timed Code Minutes- PT 13 minute(s)  -EM         Timed Charges    38015 - PT Therapeutic Exercise Minutes 5  -EM      16794 - PT Therapeutic Activity Minutes 8  -EM         Total Minutes    Timed Charges Total Minutes 13  -EM       Total Minutes 13  -EM                User Key  (r) = Recorded By, (t) = Taken By, (c) = Cosigned By      Initials Name Provider Type    EM Kiley Godinez PT Physical Therapist                  Therapy Charges for Today       Code Description Service Date Service Provider Modifiers Qty    77858708174  PT THERAPEUTIC ACT EA 15 MIN 10/5/2023 Kiley Godinez, PT GP 1    [id number] HC PT THER SUPP EA 15 MIN 10/5/2023 Kiley Godinez PT GP 1            PT G-Codes  Outcome Measure Options: AM-PAC 6 Clicks Basic Mobility (PT)  AM-PAC 6 Clicks Score (PT): 18       Kiley Godinez PT  10/5/2023

## 2023-10-05 NOTE — PROGRESS NOTES
[facility] Progress Note       Encounter Date:10/05/23  Patient:Jeremi Shankar Sr.  :1950  MRN:[id number]      Chief Complaint: post-op AVR      Subjective:      No acute complaints, wife at bedside      Medications:  Scheduled Meds:  aspirin, 81 mg, Oral, Daily  atorvastatin, 40 mg, Oral, Nightly  chlorhexidine, 15 mL, Mouth/Throat, Q12H  docusate sodium, 100 mg, Oral, BID  enoxaparin, 40 mg, Subcutaneous, Daily  guaiFENesin, 1,200 mg, Oral, Q12H  insulin lispro, 2-9 Units, Subcutaneous, 4x Daily AC & at Bedtime  metoprolol tartrate, 25 mg, Oral, Q12H  mupirocin, , Each Nare, BID  pantoprazole, 40 mg, Oral, QAM  polyethylene glycol, 17 g, Oral, Daily  senna-docusate sodium, 2 tablet, Oral, Nightly    Continuous Infusions:  sodium chloride, 30 mL/hr, Last Rate: 30 mL/hr (10/03/23 1130)    PRN Meds:    acetaminophen **OR** acetaminophen **OR** acetaminophen    ALPRAZolam    aluminum-magnesium hydroxide-simethicone    bisacodyl    bisacodyl    cyclobenzaprine    dextrose    dextrose    glucagon (human recombinant)    HYDROcodone-acetaminophen    Morphine **AND** naloxone    nitroglycerin    ondansetron    oxyCODONE    polyethylene glycol    Potassium Replacement - Follow Nurse / BPA Driven Protocol         Objective:       Vitals:    10/05/23 0412 10/05/23 0645 10/05/23 0753 10/05/23 1244   BP: 132/66  115/71 109/68   BP Location: Left arm  Left arm Left arm   Patient Position: Lying  Sitting Lying   Pulse: 85  86 87   Resp: 16  17 18   Temp: 99.2 °F (37.3 °C)  97.5 °F (36.4 °C)    TempSrc: Oral  Oral    SpO2: 91%  93% 93%   Weight:  82.2 kg (181 lb 3.2 oz)     Height:               Physical Exam:  Constitutional: Alert and conversant  HENT: Oropharynx clear and membrane moist  Eyes: Normal conjunctiva, no sclera icterus.  Neck: Supple, no carotid bruit bilaterally.  Cardiovascular: Regular rate and rhythm, No Murmur, No bilateral lower extremity edema.  Pulmonary: Normal respiratory  effort, normal lung sounds, no wheezing.  Abdominal: Soft, nontender, no hepatosplenomegaly, liver is non-pulsatile.  Neurological: Alert and orient x 3.   Skin: Warm, dry, no ecchymosis, no rash.  Psych: Appropriate mood and affect. Normal judgment and insight.     Sternotomy with drssg intact, CTs intact      Lab Review:   Results from last 7 days   Lab Units 10/05/23  0210 10/04/23  0251 10/03/23  1505 10/03/23  1112 09/29/23  0822   SODIUM mmol/L 138 142 142 139 138   POTASSIUM mmol/L 4.2 3.9 4.2 4.1 4.2   CHLORIDE mmol/L 104 108* 108* 106 103   CO2 mmol/L 26.0 24.5 23.7 23.1 28.6   BUN mg/dL 12 15 18 20 15   CREATININE mg/dL 0.75* 0.83 0.99 1.18 0.96   GLUCOSE mg/dL 146* 130* 122* 108* 110*   CALCIUM mg/dL 8.6 8.5* 8.5* 9.2 9.9   AST (SGOT) U/L  --   --   --   --  19   ALT (SGPT) U/L  --   --   --   --  16         Results from last 7 days   Lab Units 10/05/23  0210 10/04/23  0251 10/03/23  1505 10/03/23  1112 10/03/23  1002 10/03/23  0922 10/03/23  0908 10/03/23  0728 09/29/23  0822   WBC 10*3/mm3 8.03 7.33 9.80 13.12*  --   --   --   --  4.85   HEMOGLOBIN g/dL 10.2* 10.2* 11.8* 12.5*  --   --   --   --  14.0   HEMOGLOBIN, POC g/dL  --   --   --   --  10.5* 9.9* 9.5*   < >  --    HEMATOCRIT % 31.3* 30.9* 35.9* 38.0  --   --   --   --  42.7   HEMATOCRIT POC %  --   --   --   --  31* 29* 28*   < >  --    PLATELETS 10*3/mm3 99* 99* 117* 131*  --   --   --   --  198    < > = values in this interval not displayed.     Results from last 7 days   Lab Units 10/04/23  0251 10/03/23  1112 09/29/23  0822   INR  1.24* 1.29* 0.90   APTT seconds  --  30.4 27.1     Results from last 7 days   Lab Units 10/04/23  0251 10/03/23  1505 10/03/23  1112 09/29/23  0822   MAGNESIUM mg/dL 2.6* 2.4 3.1* 2.1     Results from last 7 days   Lab Units 09/29/23  0822   CHOLESTEROL mg/dL 127   TRIGLYCERIDES mg/dL 115   HDL CHOL mg/dL 48     Results from last 7 days   Lab Units 09/29/23  0822   PROBNP pg/mL <36.0                Assessment:           Diagnosis Plan   1. S/P CABG (coronary artery bypass graft)  Ambulatory Referral to Cardiac Rehab      2. Aortic stenosis due to bicuspid aortic valve  metoprolol tartrate (LOPRESSOR) tablet 12.5 mg    chlorhexidine (PERIDEX) 0.12 % solution 15 mL    ceFAZolin in dextrose (ANCEF) IVPB solution 2,000 mg    Tissue Pathology Exam    Tissue Pathology Exam      3. Nonrheumatic aortic (valve) stenosis  Tissue Pathology Exam    Tissue Pathology Exam             Plan:         Severe bicuspid aortic valve stenosis - s/p tissue AVR on 10/3. Progressing post-op with management per surgery  Coronary artery disease - single vessel s/p CABG x1 LIMA to LAD. Continue aspirin, statin  Essential hypertension - controlled  Hyperlipidemia - continue statin  DM II  Acute blood loss anemia - expected post-op - stable    Progressing post-op -- management per CTS. On beta blocker and diuresing           Candace GALLO  [facility]  10/05/23  14:27 EDT

## 2023-10-06 ENCOUNTER — APPOINTMENT (OUTPATIENT)
Dept: GENERAL RADIOLOGY | Facility: HOSPITAL | Age: 73
DRG: 221 | End: 2023-10-06
Payer: MEDICARE

## 2023-10-06 LAB
ANION GAP SERPL CALCULATED.3IONS-SCNC: 9.8 MMOL/L (ref 5–15)
BUN SERPL-MCNC: 17 MG/DL (ref 8–23)
BUN/CREAT SERPL: 21.5 (ref 7–25)
CALCIUM SPEC-SCNC: 8.9 MG/DL (ref 8.6–10.5)
CHLORIDE SERPL-SCNC: 102 MMOL/L (ref 98–107)
CO2 SERPL-SCNC: 25.2 MMOL/L (ref 22–29)
CREAT SERPL-MCNC: 0.79 MG/DL (ref 0.76–1.27)
DEPRECATED RDW RBC AUTO: 43.5 FL (ref 37–54)
EGFRCR SERPLBLD CKD-EPI 2021: 93.8 ML/MIN/1.73
ERYTHROCYTE [DISTWIDTH] IN BLOOD BY AUTOMATED COUNT: 14.2 % (ref 12.3–15.4)
GLUCOSE BLDC GLUCOMTR-MCNC: 111 MG/DL (ref 70–130)
GLUCOSE BLDC GLUCOMTR-MCNC: 115 MG/DL (ref 70–130)
GLUCOSE BLDC GLUCOMTR-MCNC: 130 MG/DL (ref 70–130)
GLUCOSE BLDC GLUCOMTR-MCNC: 143 MG/DL (ref 70–130)
GLUCOSE SERPL-MCNC: 127 MG/DL (ref 65–99)
HCT VFR BLD AUTO: 33 % (ref 37.5–51)
HGB BLD-MCNC: 10.8 G/DL (ref 13–17.7)
MCH RBC QN AUTO: 27.4 PG (ref 26.6–33)
MCHC RBC AUTO-ENTMCNC: 32.7 G/DL (ref 31.5–35.7)
MCV RBC AUTO: 83.8 FL (ref 79–97)
PLATELET # BLD AUTO: 103 10*3/MM3 (ref 140–450)
PMV BLD AUTO: 10.2 FL (ref 6–12)
POTASSIUM SERPL-SCNC: 4.2 MMOL/L (ref 3.5–5.2)
RBC # BLD AUTO: 3.94 10*6/MM3 (ref 4.14–5.8)
SODIUM SERPL-SCNC: 137 MMOL/L (ref 136–145)
WBC NRBC COR # BLD: 6.97 10*3/MM3 (ref 3.4–10.8)

## 2023-10-06 PROCEDURE — 71045 X-RAY EXAM CHEST 1 VIEW: CPT

## 2023-10-06 PROCEDURE — 97530 THERAPEUTIC ACTIVITIES: CPT

## 2023-10-06 PROCEDURE — 71046 X-RAY EXAM CHEST 2 VIEWS: CPT

## 2023-10-06 PROCEDURE — 94799 UNLISTED PULMONARY SVC/PX: CPT

## 2023-10-06 PROCEDURE — 94762 N-INVAS EAR/PLS OXIMTRY CONT: CPT

## 2023-10-06 PROCEDURE — 25010000002 ENOXAPARIN PER 10 MG: Performed by: NURSE PRACTITIONER

## 2023-10-06 PROCEDURE — 82948 REAGENT STRIP/BLOOD GLUCOSE: CPT

## 2023-10-06 PROCEDURE — 80048 BASIC METABOLIC PNL TOTAL CA: CPT | Performed by: NURSE PRACTITIONER

## 2023-10-06 PROCEDURE — 85027 COMPLETE CBC AUTOMATED: CPT | Performed by: NURSE PRACTITIONER

## 2023-10-06 PROCEDURE — 99232 SBSQ HOSP IP/OBS MODERATE 35: CPT | Performed by: NURSE PRACTITIONER

## 2023-10-06 PROCEDURE — 99024 POSTOP FOLLOW-UP VISIT: CPT | Performed by: THORACIC SURGERY (CARDIOTHORACIC VASCULAR SURGERY)

## 2023-10-06 RX ORDER — POTASSIUM CHLORIDE 750 MG/1
20 TABLET, FILM COATED, EXTENDED RELEASE ORAL DAILY
Status: DISCONTINUED | OUTPATIENT
Start: 2023-10-06 | End: 2023-10-07 | Stop reason: HOSPADM

## 2023-10-06 RX ORDER — FUROSEMIDE 40 MG/1
40 TABLET ORAL DAILY
Status: DISCONTINUED | OUTPATIENT
Start: 2023-10-06 | End: 2023-10-07 | Stop reason: HOSPADM

## 2023-10-06 RX ORDER — METOPROLOL TARTRATE 50 MG/1
50 TABLET, FILM COATED ORAL EVERY 12 HOURS SCHEDULED
Status: DISCONTINUED | OUTPATIENT
Start: 2023-10-06 | End: 2023-10-07 | Stop reason: HOSPADM

## 2023-10-06 RX ADMIN — FUROSEMIDE 40 MG: 40 TABLET ORAL at 18:03

## 2023-10-06 RX ADMIN — GUAIFENESIN 1200 MG: 600 TABLET, EXTENDED RELEASE ORAL at 09:34

## 2023-10-06 RX ADMIN — PANTOPRAZOLE SODIUM 40 MG: 40 TABLET, DELAYED RELEASE ORAL at 06:33

## 2023-10-06 RX ADMIN — OXYCODONE HYDROCHLORIDE 10 MG: 5 TABLET ORAL at 21:47

## 2023-10-06 RX ADMIN — POLYETHYLENE GLYCOL 3350 17 G: 17 POWDER, FOR SOLUTION ORAL at 18:03

## 2023-10-06 RX ADMIN — HYDROCODONE BITARTRATE AND ACETAMINOPHEN 2 TABLET: 5; 325 TABLET ORAL at 03:50

## 2023-10-06 RX ADMIN — ATORVASTATIN CALCIUM 40 MG: 20 TABLET, FILM COATED ORAL at 21:47

## 2023-10-06 RX ADMIN — ASPIRIN 81 MG: 81 TABLET, COATED ORAL at 09:33

## 2023-10-06 RX ADMIN — METOPROLOL TARTRATE 50 MG: 50 TABLET, FILM COATED ORAL at 21:47

## 2023-10-06 RX ADMIN — ACETAMINOPHEN 650 MG: 325 TABLET ORAL at 09:43

## 2023-10-06 RX ADMIN — MUPIROCIN 1 APPLICATION: 20 OINTMENT TOPICAL at 09:34

## 2023-10-06 RX ADMIN — SENNOSIDES AND DOCUSATE SODIUM 2 TABLET: 50; 8.6 TABLET ORAL at 21:48

## 2023-10-06 RX ADMIN — POTASSIUM CHLORIDE 20 MEQ: 750 TABLET, EXTENDED RELEASE ORAL at 09:34

## 2023-10-06 RX ADMIN — GUAIFENESIN 1200 MG: 600 TABLET, EXTENDED RELEASE ORAL at 21:47

## 2023-10-06 RX ADMIN — METOPROLOL TARTRATE 50 MG: 50 TABLET, FILM COATED ORAL at 09:33

## 2023-10-06 RX ADMIN — DOCUSATE SODIUM 100 MG: 100 CAPSULE, LIQUID FILLED ORAL at 18:03

## 2023-10-06 RX ADMIN — MUPIROCIN 1 APPLICATION: 20 OINTMENT TOPICAL at 21:48

## 2023-10-06 RX ADMIN — ENOXAPARIN SODIUM 40 MG: 100 INJECTION SUBCUTANEOUS at 18:11

## 2023-10-06 NOTE — PLAN OF CARE
"Goal Outcome Evaluation:               Pt VSS. POD CABGx1 LIMA AVR. Chest tubes and wires removed. C/O mild \"aura\" and headache, relieved per MAR. No other complaints. Possible D/C tomorrow. No acute issues at this time.          "

## 2023-10-06 NOTE — CASE MANAGEMENT/SOCIAL WORK
Discharge Planning Assessment  [facility]     Patient Name: Jeremi Shankar Sr.  MRN: [id number]  Today's Date: 10/6/2023    Admit Date: 10/3/2023    Plan: Home w/ [facility]   Discharge Needs Assessment       Row Name 10/06/23 1449       Living Environment    People in Home spouse    Name(s) of People in Home Mia Shankar, wife    Current Living Arrangements home    Potentially Unsafe Housing Conditions none    Provides Primary Care For pet(s)  1 DOG    Family Caregiver if Needed spouse    Quality of Family Relationships helpful;involved;supportive    Able to Return to Prior Arrangements yes       Resource/Environmental Concerns    Resource/Environmental Concerns none    Transportation Concerns none       Food Insecurity    Within the past 12 months, you worried that your food would run out before you got the money to buy more. Never true    Within the past 12 months, the food you bought just didn't last and you didn't have money to get more. Never true       Transition Planning    Patient/Family Anticipates Transition to home with family    Patient/Family Anticipated Services at Transition home health care    Transportation Anticipated family or friend will provide       Discharge Needs Assessment    Readmission Within the Last 30 Days no previous admission in last 30 days    Equipment Currently Used at Home bp cuff;scales;grab bar    Concerns to be Addressed discharge planning    Anticipated Changes Related to Illness none    Equipment Needed After Discharge none    Discharge Facility/Level of Care Needs home with home health    Provided Post Acute Provider List? Yes    Post Acute Provider List Home Health    Delivered To Patient;Support Person    Support Person Pt and wife    Method of Delivery In person    Patient's Choice of Community Agency(s) [facility]                   Discharge Plan       Row Name 10/06/23 1455       Plan    Plan Home w/ [facility]    Plan Comments CCP spoke with Pt  and family at bedside.  CCP received permission from Pt prior to speaking in front of family.  CCP role explained and discharge planning discussed.  Face sheet verified.  Pt stated he is IADL's, retired and drives.  Pt lives with spouse/Mia Shankar, in a two-story home with a total of seven entrance stair steps.  Pt reports PCP is, Jhon Alvarez.  Pt confirmed pharmacy is, [facility] [place], [place].  Pt denies use of past home health or going to a sub-acute rehab.  Pt has the following DME- grab bars, scale and bp cuff monitor.  Pt plans to return home at discharge.  Pt request to use [employer] (son works for them).  CCP sent [facility] and she [facility].  Family plans to transport Pt home.  CCP will continue to follow…….Heaven GONSALVES /.                  Continued Care and Services - Admitted Since 10/3/2023       Home Medical Care Coordination complete.      Service Provider Request Status Selected Services Address Phone Fax Patient Preferred    [facility]  Selected Home Health Services [address] [place] [id number] [phone] --                  Expected Discharge Date and Time       Expected Discharge Date Expected Discharge Time    Oct 10, 2023            Demographic Summary       Row Name 10/06/23 1448       General Information    Admission Type inpatient    Arrived From home    Required Notices Provided Important Message from Medicare    Referral Source admission list;physician    Reason for Consult discharge planning    Preferred Language English                   Functional Status       Row Name 10/06/23 1449       Functional Status    Usual Activity Tolerance good    Current Activity Tolerance moderate       Assessment of Health Literacy    How often do you have someone help you read hospital materials? Never    Health Literacy Good       Functional Status, IADL    Medications independent    Housekeeping independent    Laundry  independent    Shopping independent       Mental Status    General Appearance WDL WDL       Mental Status Summary    Recent Changes in Mental Status/Cognitive Functioning no changes       Employment/    Employment Status retired                   Psychosocial    No documentation.                  Abuse/Neglect    No documentation.                  Legal    No documentation.                  Substance Abuse    No documentation.                  Patient Forms    No documentation.                     Heaven Duvall RN

## 2023-10-06 NOTE — THERAPY TREATMENT NOTE
Patient Name: Jeremi Shankar .  : 1950    MRN: [id number]                              Today's Date: 10/6/2023       Admit Date: 10/3/2023    Visit Dx:     ICD-10-CM ICD-9-CM   1. S/P CABG (coronary artery bypass graft)  Z95.1 V45.81   2. Aortic stenosis due to bicuspid aortic valve  Q23.0 746.3    Q23.1 746.4   3. Nonrheumatic aortic (valve) stenosis  I35.0 424.1     Patient Active Problem List   Diagnosis    History of colon polyps    Primary hypertension    Prediabetes    Hyperlipidemia    Peyronie's disease    Aortic stenosis due to bicuspid aortic valve    Aneurysm of ascending aorta without rupture    Ascending aortic aneurysm     Past Medical History:   Diagnosis Date    Aortic stenosis 2023    Aortic stenosis due to bicuspid aortic valve     Arthritis     arthritis is in my hands    Benign essential hypertension     Bilateral carpal tunnel syndrome 2020    Cataract     Eye doctor saw them in exam    CHF (congestive heart failure)     Colon polyp  or     Diverticulosis of large intestine 2016    Description: 2015????????colonoscopy????????Dr. Francis????????diverticulosis/colon polyps (2).    Headache 1965    History of transfusion     Hyperlipidemia     Type 2 diabetes mellitus     Visual impairment     glasses for reading     Past Surgical History:   Procedure Laterality Date    ASCENDING AORTIC ANEURYSM REPAIR W/ MECHANICAL AORTIC VALVE REPLACEMENT N/A 10/3/2023    Procedure: INTRAOPERATIVE RAYRAY; STERNOTOMY; CORONARY ARTERY BYPASS GRAFT TIMES ONE USING LEFT INTERNAL MAMMARY ARTERY; AORTIC VALVE REPLACEMENT; NEURO MONITORING AND PRP.;  Surgeon: Benton Mcduffie MD;  Location: [facility] CVOR;  Service: Cardiothoracic;  Laterality: N/A;    CARDIAC CATHETERIZATION N/A 2023    Procedure: Right Heart Cath;  Surgeon: Kun Desai MD;  Location: [facility] CATH INVASIVE LOCATION;  Service: Cardiovascular;  Laterality: N/A;    CARDIAC CATHETERIZATION N/A  8/8/2023    Procedure: Coronary angiography;  Surgeon: Kun Desai MD;  Location: [facility] INVASIVE LOCATION;  Service: Cardiovascular;  Laterality: N/A;    CARPAL TUNNEL RELEASE Left 05/2022    COLONOSCOPY  2007    Dr Francis --WNL.  March 2015â€“Dr. Hernandez€“colon polyps (2); diverticulosis.    COLONOSCOPY  2014?    polyps--DR Francis    HERNIA REPAIR      (R) REPAIR TWICE      General Information       Row Name 10/06/23 1051          Physical Therapy Time and Intention    Document Type therapy note (daily note)  -ER     Mode of Treatment individual therapy;physical therapy  -ER       Row Name 10/06/23 1051          General Information    Patient Profile Reviewed yes  -ER     Existing Precautions/Restrictions fall;cardiac;sternal  -ER       Row Name 10/06/23 1051          Cognition    Orientation Status (Cognition) oriented x 3  -ER       Row Name 10/06/23 1051          Safety Issues, Functional Mobility    Impairments Affecting Function (Mobility) strength;endurance/activity tolerance;pain  -ER               User Key  (r) = Recorded By, (t) = Taken By, (c) = Cosigned By      Initials Name Provider Type    ER Vandana Sal, PT Physical Therapist                   Mobility       Row Name 10/06/23 1051          Bed Mobility    Bed Mobility supine-sit  -ER     Supine-Sit Pierce (Bed Mobility) minimum assist (75% patient effort);verbal cues  -ER       Row Name 10/06/23 1051          Sit-Stand Transfer    Sit-Stand Pierce (Transfers) contact guard;1 person to manage equipment  -ER     Assistive Device (Sit-Stand Transfers) other (see comments)  HHAx2  -ER       Row Name 10/06/23 1051          Gait/Stairs (Locomotion)    Pierce Level (Gait) contact guard;1 person to manage equipment  -ER     Assistive Device (Gait) other (see comments)  B HHA  -ER     Patient was able to Ambulate yes  -ER     Distance in Feet (Gait) 150  -ER     Deviations/Abnormal Patterns (Gait) gait speed decreased  -ER      Bilateral Gait Deviations forward flexed posture  -ER     Comment, (Gait/Stairs) hips turned towards L, cues for forward facing hips while ambulating  -ER               User Key  (r) = Recorded By, (t) = Taken By, (c) = Cosigned By      Initials Name Provider Type    ER Vandana Sal, PT Physical Therapist                   Obj/Interventions       Row Name 10/06/23 1103          Motor Skills    Therapeutic Exercise --  Cardiac protocol level 2 x5 reps  -ER       Row Name 10/06/23 1103          Balance    Balance Assessment sitting static balance;sit to stand dynamic balance;sitting dynamic balance;standing static balance  -ER     Static Sitting Balance supervision  -ER     Dynamic Sitting Balance supervision;contact guard  -ER     Position, Sitting Balance sitting edge of bed  -ER     Sit to Stand Dynamic Balance contact guard;minimal assist  -ER     Static Standing Balance contact guard;minimal assist  -ER     Position/Device Used, Standing Balance other (see comments)  B HHA  -ER     Balance Interventions sitting;standing;sit to stand;supported;static;dynamic  -ER               User Key  (r) = Recorded By, (t) = Taken By, (c) = Cosigned By      Initials Name Provider Type    ER Vandana Sal, PT Physical Therapist                   Goals/Plan       Row Name 10/06/23 1107          Problem Specific Goal 1 (PT)    Problem Specific Goal 1 (PT) Cardiac Level 3  -ER     Time Frame (Problem Specific Goal 1, PT) 1 week  -ER               User Key  (r) = Recorded By, (t) = Taken By, (c) = Cosigned By      Initials Name Provider Type    ER Vandana Sal, PT Physical Therapist                   Clinical Impression       Row Name 10/06/23 1104          Pain    Pretreatment Pain Rating 0/10 - no pain  -ER     Posttreatment Pain Rating 0/10 - no pain  -ER     Pain Intervention(s) Rest;Repositioned;Ambulation/increased activity  -ER       Row Name 10/06/23 1104          Plan of Care Review    Plan of Care Reviewed With patient;spouse   -ER     Outcome Evaluation Pt. is a 72 year old male seen for PT treatment session this AM. Today, pt. performs supine>sit with Min A, increased assistance provided for elevation of trunk. STS from EOB CGA-Min A x2 B HHA , extra hand for management of chest tubes/lines. Pt. ambulates ~150 ft. with B HHA and CGA. Sit>supine, pt. requires CGA-Min A. PT recommending home with assist/ HH at d/c.  -ER       Row Name 10/06/23 1104          Therapy Assessment/Plan (PT)    Criteria for Skilled Interventions Met (PT) yes  -ER     Therapy Frequency (PT) 6 times/wk  -ER       Row Name 10/06/23 1104          Positioning and Restraints    Pre-Treatment Position in bed  -ER     Post Treatment Position bed  -ER     In Bed notified nsg;call light within reach;encouraged to call for assist;exit alarm on;with family/caregiver  -ER               User Key  (r) = Recorded By, (t) = Taken By, (c) = Cosigned By      Initials Name Provider Type    ER Vandana Sal, PILAR Physical Therapist                   Outcome Measures       Row Name 10/06/23 1107          How much help from another person do you currently need...    Turning from your back to your side while in flat bed without using bedrails? 3  -ER     Moving from lying on back to sitting on the side of a flat bed without bedrails? 3  -ER     Moving to and from a bed to a chair (including a wheelchair)? 3  -ER     Standing up from a chair using your arms (e.g., wheelchair, bedside chair)? 3  -ER     Climbing 3-5 steps with a railing? 3  -ER     To walk in hospital room? 3  -ER     AM-PAC 6 Clicks Score (PT) 18  -ER     Highest level of mobility 6 --> Walked 10 steps or more  -ER       Row Name 10/06/23 1107          Functional Assessment    Outcome Measure Options AM-PAC 6 Clicks Basic Mobility (PT)  -ER               User Key  (r) = Recorded By, (t) = Taken By, (c) = Cosigned By      Initials Name Provider Type    Vandana Umanzor, PILAR Physical Therapist                                  Physical Therapy Education       Title: PT OT SLP Therapies (Done)       Topic: Physical Therapy (Done)       Point: Mobility training (Done)       Learning Progress Summary             Patient Acceptance, E, VU by ER at 10/6/2023 1107    Acceptance, E, VU by EM at 10/5/2023 1535    Acceptance, E,TB,D, VU,NR by EJ at 10/4/2023 1517                         Point: Home exercise program (Done)       Learning Progress Summary             Patient Acceptance, E, VU by ER at 10/6/2023 1107    Acceptance, E, VU by EM at 10/5/2023 1535    Acceptance, E,TB,D, VU,NR by EJ at 10/4/2023 1517                         Point: Body mechanics (Done)       Learning Progress Summary             Patient Acceptance, E, VU by ER at 10/6/2023 1107    Acceptance, E,TB,D, VU,NR by EJ at 10/4/2023 1517                         Point: Precautions (Done)       Learning Progress Summary             Patient Acceptance, E, VU by ER at 10/6/2023 1107    Acceptance, E,TB,D, VU,NR by EJ at 10/4/2023 1517                                         User Key       Initials Effective Dates Name Provider Type Discipline    EM 06/16/21 -  Kiley Godinez, PT Physical Therapist PT    EJ 06/16/21 -  Henny Preciado, PT Physical Therapist PT    ER 06/26/23 -  Vandana Sal, PT Physical Therapist PT                  PT Recommendation and Plan     Plan of Care Reviewed With: patient, spouse  Outcome Evaluation: Pt. is a 72 year old male seen for PT treatment session this AM. Today, pt. performs supine>sit with Min A, increased assistance provided for elevation of trunk. STS from EOB CGA-Min A x2 B HHA , extra hand for management of chest tubes/lines. Pt. ambulates ~150 ft. with B HHA and CGA. Sit>supine, pt. requires CGA-Min A. PT recommending home with assist/ HH at d/c.     Time Calculation:         PT Charges       Row Name 10/06/23 1109             Time Calculation    Start Time 0940  -ER      Stop Time 0951  -ER      Time Calculation (min) 11 min  -ER       PT Received On 10/06/23  -ER      PT - Next Appointment 10/07/23  -ER         Time Calculation- PT    Total Timed Code Minutes- PT 11 minute(s)  -ER         Timed Charges    61634 - PT Therapeutic Activity Minutes 11  -ER         Total Minutes    Timed Charges Total Minutes 11  -ER       Total Minutes 11  -ER                User Key  (r) = Recorded By, (t) = Taken By, (c) = Cosigned By      Initials Name Provider Type    ER Vandana Sal, PT Physical Therapist                  Therapy Charges for Today       Code Description Service Date Service Provider Modifiers Qty    82921401989 HC PT THERAPEUTIC ACT EA 15 MIN 10/6/2023 Vandana Sal, PT GP 1            PT G-Codes  Outcome Measure Options: AM-PAC 6 Clicks Basic Mobility (PT)  AM-PAC 6 Clicks Score (PT): 18  PT Discharge Summary  Anticipated Discharge Disposition (PT): home with assist, home with home health    Vandana Sal PT  10/6/2023

## 2023-10-06 NOTE — DISCHARGE SUMMARY
Date of Admission:   Date of Discharge:  10/7/2023    Discharge Diagnosis:   - severe bicuspid aortic valve stenosis- s/p AVR (tissue), CABGx1 LIMA--Reta  - ascending aortic aneurysm--4.7 cm per CTA  - severe single vessel CAD--70% LAD  - hypertension  - hyperlipidemia--statin therapy  - DM II--A1c 6.1  -post op anemia-expected acute blood loss          Presenting Problem/History of Present Illness  Aortic stenosis due to bicuspid aortic valve [Q23.0, Q23.1]  Nonrheumatic aortic (valve) stenosis [I35.0]  Ascending aortic aneurysm [I71.21]     Hospital Course  Patient is a 72 y.o. male presented with for cardiac surgery on 10/3 he underwent an elective   CABG x 1 with a LIMA to the mid LAD entheses ; AVR with a 27 mm magna pericardial prosthesis by Dr. Mcduffie (see op report for full details) his aneurysm was just dilatation 4.2cm and did not need to be intervened on at this time.  Will require surveillance as an outpatient.  Post operatively he did well. Tolerated medication therapy on ASA/statin/beta blocker.  All lines tubes and wires removed without issue.  Weaned from oxygen therapy.  Adequate PO intake. Urinating and defecating normally.  An overnight revealed the need for nocturnal oxygen with 1hour and 14 minutes of desaturations.  On post operative day 4 she was deemed ready for discharge home with home health.  Follow up appointments as below.  Patient was provided with appropriate discharge education. He was instructed to call office with any questions or concerns.      Procedures Performed  Procedure(s):  INTRAOPERATIVE RAYRAY; STERNOTOMY; CORONARY ARTERY BYPASS GRAFT TIMES ONE USING LEFT INTERNAL MAMMARY ARTERY; AORTIC VALVE REPLACEMENT; NEURO MONITORING AND PRP.       Consults:   Consults       Date and Time Order Name Status Description    10/3/2023 11:06 AM Inpatient Cardiology Consult Completed             Pertinent Test Results:    Lab Results   Component Value Date    WBC 6.23 10/07/2023    HGB 10.7  (L) 10/07/2023    HCT 32.7 (L) 10/07/2023    MCV 83.0 10/07/2023     10/07/2023      Lab Results   Component Value Date    GLUCOSE 124 (H) 10/07/2023    CALCIUM 8.9 10/07/2023     10/07/2023    K 4.4 10/07/2023    CO2 30.0 (H) 10/07/2023    CL 99 10/07/2023    BUN 16 10/07/2023    CREATININE 0.93 10/07/2023    EGFRIFAFRI 87 06/18/2021    EGFRIFNONA 72 06/18/2021    BCR 17.2 10/07/2023    ANIONGAP 7.0 10/07/2023     Lab Results   Component Value Date    INR 1.24 (H) 10/04/2023    PROTIME 15.8 (H) 10/04/2023         Condition on Discharge:  stable    Vital Signs  Temp:  [97.9 °F (36.6 °C)-99.6 °F (37.6 °C)] 99.2 °F (37.3 °C)  Heart Rate:  [80-92] 84  Resp:  [17] 17  BP: (110-135)/(62-70) 126/68      [place]charge Disposition  [place] [facility]    Discharge Medications     Discharge Medications        New Medications        Instructions Start Date   aspirin 81 MG EC tablet   81 mg, Oral, Daily      cyclobenzaprine 5 MG tablet  Commonly known as: FLEXERIL   5 mg, Oral, Every 8 Hours PRN      furosemide 40 MG tablet  Commonly known as: LASIX   40 mg, Oral, Daily      guaiFENesin 600 MG 12 hr tablet  Commonly known as: MUCINEX   1,200 mg, Oral, Every 12 Hours Scheduled      HYDROcodone-acetaminophen 5-325 MG per tablet  Commonly known as: NORCO   1 tablet, Oral, Every 4 Hours PRN      metoprolol tartrate 50 MG tablet  Commonly known as: LOPRESSOR   50 mg, Oral, Every 12 Hours Scheduled      potassium chloride 20 MEQ CR tablet  Commonly known as: K-DUR,KLOR-CON   20 mEq, Oral, Daily             Continue These Medications        Instructions Start Date   atorvastatin 40 MG tablet  Commonly known as: LIPITOR   TAKE 1 TABLET EVERY NIGHT      ezetimibe 10 MG tablet  Commonly known as: ZETIA   TAKE 1 TABLET DAILY      metFORMIN  MG 24 hr tablet  Commonly known as: GLUCOPHAGE-XR   TAKE 1 TABLET BY MOUTH WITH DINNER             Stop These Medications      losartan-hydrochlorothiazide 100-12.5 MG per  tablet  Commonly known as: HYZAAR              Discharge Diet:     Activity at Discharge: 1. No driving for 2 weeks and off narcotic pain medications.  2. Shower daily. Clean incisions with warm water and antibacterial soap only. Do not put any lotion or ointments on incisions.  3. Ambulate for 10 minutes at least 3 times a day.  4. No heavy lifting > 10lbs until seen in office.   5. Take all medications as prescribed.       Follow-up Appointments  Future Appointments   Date Time Provider Department Center   10/20/2023 10:00 AM Nash Grey MD [facility]   12/11/2023  9:00 AM Jhon Alvarez MD [facility]     Additional Instructions for the Follow-ups that You Need to Schedule       Ambulatory Referral to Cardiac Rehab   As directed      Ambulatory Referral to Home Health   As directed      Face to Face Visit Date: 10/7/2023   Follow-up provider for Plan of Care?: I will be treating the patient on an ongoing basis.  Please send me the Plan of Care for signature.   Follow-up provider: DONG RITTER [[id number]]   Reason/Clinical Findings: post op open heart   Describe mobility limitations that make leaving home difficult: weakness   Nursing/Therapeutic Services Requested: Skilled Nursing   Skilled nursing orders: Post CABG care   Frequency: 1 Week 1        Call MD With Problems / Concerns   As directed      Instructions:  Call office at [phone] for any drainage, increased redness, or fever over 100.5    Order Comments: Instructions:  Call office at [phone] for any drainage, increased redness, or fever over 100.5         Discharge Follow-up with PCP   As directed       Currently Documented PCP:    Jhon Alvarez MD    PCP Phone Number:    [phone]     Follow Up Details: in 1 week        Discharge Follow-up with Specialty: Cardiologist APRN/PA; 1 Week   As directed      Specialty: Cardiologist APRN/PA   Follow Up: 1 Week   Follow Up Details: bring all prescription bottles to appointment, call for  appointment        Discharge Follow-up with Specified Provider: Cardiologist; 1 Month   As directed      To: Cardiologist   Follow Up: 1 Month   Follow Up Details: call for appointment, bring all medication bottles to appointment        Discharge Follow-up with Specified Provider:    As directed      To:    Follow Up Details: 4-6 weeks, bring all current medications to appointment                Test Results Pending at Discharge       CLEO Koch  10/07/23  07:59 EDT

## 2023-10-06 NOTE — PLAN OF CARE
Goal Outcome Evaluation:           Progress: improving  Outcome Evaluation: Patient doing well tonight. Pain has been under control with one dose of Norco. Urinating after catheter removal. VSS. Pacer off tonight and patient's intrinsic rhythm is adequate. Bowel sounds active. WCTM      Problem: Adult Inpatient Plan of Care  Goal: Plan of Care Review  Outcome: Ongoing, Progressing  Flowsheets  Taken 10/6/2023 0109 by Adrien Perez RN  Progress: improving  Outcome Evaluation: Patient doing well tonight. Pain has been under control with one dose of Norco. Urinating after catheter removal. VSS. Pacer off tonight and patient's intrinsic rhythm is adequate. Bowel sounds active. WCTM  Taken 10/5/2023 1621 by Paolo Thomas RN  Plan of Care Reviewed With:   patient   spouse  Goal: Patient-Specific Goal (Individualized)  Outcome: Ongoing, Progressing  Goal: Absence of Hospital-Acquired Illness or Injury  Outcome: Ongoing, Progressing  Intervention: Identify and Manage Fall Risk  Recent Flowsheet Documentation  Taken 10/6/2023 0000 by Adrien Perez RN  Safety Promotion/Fall Prevention:   activity supervised   fall prevention program maintained   safety round/check completed  Taken 10/5/2023 2200 by Adrien Perez RN  Safety Promotion/Fall Prevention:   activity supervised   fall prevention program maintained   safety round/check completed  Taken 10/5/2023 2000 by Adrien Perez RN  Safety Promotion/Fall Prevention:   activity supervised   fall prevention program maintained   nonskid shoes/slippers when out of bed   safety round/check completed  Intervention: Prevent Skin Injury  Recent Flowsheet Documentation  Taken 10/6/2023 0000 by Adrien Perez RN  Body Position:   supine, legs elevated   position changed independently  Taken 10/5/2023 2200 by Adrien Perez RN  Body Position:   supine, legs elevated   position changed independently  Taken 10/5/2023 2000 by Adrien Perez RN  Body  Position:   supine, legs elevated   tilted   right  Intervention: Prevent Infection  Recent Flowsheet Documentation  Taken 10/6/2023 0000 by Adrien Perez RN  Infection Prevention: rest/sleep promoted  Taken 10/5/2023 2200 by Adrien Perez RN  Infection Prevention: rest/sleep promoted  Taken 10/5/2023 2000 by Adrien Perez RN  Infection Prevention: rest/sleep promoted  Goal: Optimal Comfort and Wellbeing  Outcome: Ongoing, Progressing  Intervention: Monitor Pain and Promote Comfort  Recent Flowsheet Documentation  Taken 10/5/2023 2000 by Adrien Perez RN  Pain Management Interventions:   pillow support provided   position adjusted   see MAR  Goal: Readiness for Transition of Care  Outcome: Ongoing, Progressing     Problem: Fall Injury Risk  Goal: Absence of Fall and Fall-Related Injury  Outcome: Ongoing, Progressing  Intervention: Identify and Manage Contributors  Recent Flowsheet Documentation  Taken 10/6/2023 0000 by Adrien Perez RN  Medication Review/Management: medications reviewed  Taken 10/5/2023 2200 by Adrien Perez RN  Medication Review/Management: medications reviewed  Taken 10/5/2023 2000 by Adrien Perez RN  Medication Review/Management: medications reviewed  Intervention: Promote Injury-Free Environment  Recent Flowsheet Documentation  Taken 10/6/2023 0000 by Adrien Perez RN  Safety Promotion/Fall Prevention:   activity supervised   fall prevention program maintained   safety round/check completed  Taken 10/5/2023 2200 by Adrien Perez RN  Safety Promotion/Fall Prevention:   activity supervised   fall prevention program maintained   safety round/check completed  Taken 10/5/2023 2000 by Adrien Perez RN  Safety Promotion/Fall Prevention:   activity supervised   fall prevention program maintained   nonskid shoes/slippers when out of bed   safety round/check completed     Problem: Skin Injury Risk Increased  Goal: Skin Health and Integrity  Outcome:  Ongoing, Progressing  Intervention: Optimize Skin Protection  Recent Flowsheet Documentation  Taken 10/6/2023 0000 by Adrien Perez, RN  Head of Bed (HOB) Positioning: HOB at 30 degrees  Taken 10/5/2023 2200 by Adrien Perez, RN  Head of Bed (HOB) Positioning: HOB at 30 degrees  Taken 10/5/2023 2000 by Adrien Perez, RN  Head of Bed (HOB) Positioning: HOB at 30 degrees

## 2023-10-06 NOTE — PROGRESS NOTES
"[facility]    Patient Name: Jeremi Shankar Sr.  :1950  72 y.o.  LOS: 3  Encounter Provider: CLEO Tamayo      Patient Care Team:  Jhon Alvarez MD as PCP - General (Internal Medicine)  Nash Grey MD as Consulting Physician (Cardiology)    Chief Complaint:  post-op AVR     Interval History: Feeling better, just back to bed. BP and HR stable.       Objective   Vital Signs  Temp:  [98.1 °F (36.7 °C)-100 °F (37.8 °C)] 100 °F (37.8 °C)  Heart Rate:  [84-99] 88  Resp:  [17-18] 17  BP: (109-132)/(60-68) 132/65    Intake/Output Summary (Last 24 hours) at 10/6/2023 0825  Last data filed at 10/6/2023 0600  Gross per 24 hour   Intake 1140 ml   Output 1405 ml   Net -265 ml     Flowsheet Rows      Flowsheet Row First Filed Value   Admission Height 177.8 cm (70\") Documented at 10/03/2023 0536   Admission Weight 87 kg (191 lb 12.8 oz) Documented at 10/04/2023 0500              Vitals reviewed.   Constitutional:       General: Not in acute distress.     Appearance: Well-developed. Not diaphoretic.   HENT:      Head: Normocephalic.   Pulmonary:      Effort: Pulmonary effort is normal. No respiratory distress.      Breath sounds: Normal breath sounds. No wheezing. No rhonchi. No rales.   Cardiovascular:      Normal rate. Regular rhythm.   Pulses:     Radial: 2+ bilaterally.  Edema:     Peripheral edema absent.   Skin:     General: Skin is warm and dry. There is no cyanosis.      Findings: No rash.   Neurological:      Mental Status: Alert and oriented to person, place, and time.   Psychiatric:         Behavior: Behavior normal.         Thought Content: Thought content normal.         Judgment: Judgment normal.         Pertinent Test Results:  Results from last 7 days   Lab Units 10/06/23  0241 10/05/23  0210 10/04/23  0251 10/03/23  1505 10/03/23  1112   SODIUM mmol/L 137 138 142 142 139   POTASSIUM mmol/L 4.2 4.2 3.9 4.2 4.1   CHLORIDE mmol/L 102 104 108* 108* 106   CO2 mmol/L 25.2 " 26.0 24.5 23.7 23.1   BUN mg/dL 17 12 15 18 20   CREATININE mg/dL 0.79 0.75* 0.83 0.99 1.18   GLUCOSE mg/dL 127* 146* 130* 122* 108*   CALCIUM mg/dL 8.9 8.6 8.5* 8.5* 9.2         Results from last 7 days   Lab Units 10/06/23  0241 10/05/23  0210 10/04/23  0251 10/03/23  1505 10/03/23  1112 10/03/23  1002 10/03/23  0922   WBC 10*3/mm3 6.97 8.03 7.33 9.80 13.12*  --   --    HEMOGLOBIN g/dL 10.8* 10.2* 10.2* 11.8* 12.5*  --   --    HEMOGLOBIN, POC g/dL  --   --   --   --   --  10.5* 9.9*   HEMATOCRIT % 33.0* 31.3* 30.9* 35.9* 38.0  --   --    HEMATOCRIT POC %  --   --   --   --   --  31* 29*   PLATELETS 10*3/mm3 103* 99* 99* 117* 131*  --   --      Results from last 7 days   Lab Units 10/04/23  0251 10/03/23  1112   INR  1.24* 1.29*   APTT seconds  --  30.4     Results from last 7 days   Lab Units 10/04/23  0251 10/03/23  1505 10/03/23  1112   MAGNESIUM mg/dL 2.6* 2.4 3.1*           Invalid input(s): LDLCALC                Medication Review:   aspirin, 81 mg, Oral, Daily  atorvastatin, 40 mg, Oral, Nightly  chlorhexidine, 15 mL, Mouth/Throat, Q12H  docusate sodium, 100 mg, Oral, BID  enoxaparin, 40 mg, Subcutaneous, Daily  furosemide, 40 mg, Oral, Daily  guaiFENesin, 1,200 mg, Oral, Q12H  insulin lispro, 2-9 Units, Subcutaneous, 4x Daily AC & at Bedtime  metoprolol tartrate, 50 mg, Oral, Q12H  mupirocin, , Each Nare, BID  pantoprazole, 40 mg, Oral, QAM  polyethylene glycol, 17 g, Oral, Daily  potassium chloride, 20 mEq, Oral, Daily  senna-docusate sodium, 2 tablet, Oral, Nightly         sodium chloride, 30 mL/hr, Last Rate: 30 mL/hr (10/03/23 1130)        Assessment & Plan     Active Hospital Problems    Diagnosis  POA    **Ascending aortic aneurysm [I71.21]  Yes    Aortic stenosis due to bicuspid aortic valve [Q23.0, Q23.1]  Not Applicable      Resolved Hospital Problems   No resolved problems to display.        Severe bicuspid aortic valve stenosis - s/p tissue AVR on 10/3. Progressing post-op with management per  surgery.   Coronary artery disease - single vessel s/p CABG x1 LIMA to LAD. Continue aspirin, statin  Essential hypertension - controlled on current regimen.   Hyperlipidemia - continue statin  DM II on SSI  Acute blood loss anemia - expected post-op - HGB improving      CLEO Tamayo  [facility]  10/06/23  08:25 EDT

## 2023-10-06 NOTE — DISCHARGE PLACEMENT REQUEST
"Raad Hayes GARDNER  (72 y.o. Male)       Date of Birth   1950    Social Security Number       Address   [address] [id number]    Home Phone   [phone]    MRN   [id number]       Rastafarian   Baptism    Marital Status                               Admission Date   10/3/23    Admission Type   Elective    Admitting Provider   Benton Mcduffie MD    Attending Provider   Benton Mcduffie MD    Department, Room/Bed   [facility] CARDIOVASC UNIT, 2220/1       Discharge Date       Discharge Disposition       Discharge Destination                                 Attending Provider: Benton Mcduffie MD    Allergies: Lisinopril    Isolation: None   Infection: None   Code Status: CPR    Ht: 177.8 cm (70\")   Wt: 81 kg (178 lb 9.6 oz)    Admission Cmt: None   Principal Problem: Ascending aortic aneurysm [I71.21]                   Active Insurance as of 10/3/2023       Primary Coverage       Payor Plan Insurance Group Employer/Plan Group    HUMANA MEDICARE REPLACEMENT HUMANA MEDICARE REPLACEMENT [id number]       Payor Plan Address Payor Plan Phone Number Payor Plan Fax Number Effective Dates    [address] [address] [phone]  5/1/2021 - None Entered    Former[facility] [id number]         Subscriber Name Subscriber Birth Date Member ID       HAYES SEARS SRMilagros 1950 [id number]                     Emergency Contacts        (Rel.) Home Phone Work Phone Mobile Phone    Mia Sears (Spouse) [phone] -- [phone]                "

## 2023-10-06 NOTE — PLAN OF CARE
Goal Outcome Evaluation:  Plan of Care Reviewed With: patient, spouse           Outcome Evaluation: Pt. is a 72 year old male seen for PT treatment session this AM. Today, pt. performs supine>sit with Min A, increased assistance provided for elevation of trunk. STS from EOB CGA-Min A x2 B HHA , extra hand for management of chest tubes/lines. Pt. ambulates ~150 ft. with B HHA and CGA. Sit>supine, pt. requires CGA-Min A. PT recommending home with assist/ HH at d/c.      Anticipated Discharge Disposition (PT): home with assist, home with home health

## 2023-10-06 NOTE — PROGRESS NOTES
" LOS: 3 days   Patient Care Team:  Jhon Alvarez MD as PCP - General (Internal Medicine)  Nash Grey MD as Consulting Physician (Cardiology)    Chief Complaint: post op    Subjective      Vital Signs  Temp:  [98.1 °F (36.7 °C)-100 °F (37.8 °C)] 100 °F (37.8 °C)  Heart Rate:  [84-99] 88  Resp:  [17-18] 17  BP: (109-132)/(60-68) 132/65  Body mass index is 25.63 kg/m².    Intake/Output Summary (Last 24 hours) at 10/6/2023 0808  Last data filed at 10/6/2023 0600  Gross per 24 hour   Intake 1140 ml   Output 1405 ml   Net -265 ml       No intake/output data recorded.    Chest tube drainage last 8 hours 50/50        10/04/23  0500 10/05/23  0645 10/06/23  0600   Weight: 87 kg (191 lb 12.8 oz) 82.2 kg (181 lb 3.2 oz) 81 kg (178 lb 9.6 oz)         Objective:  Vital signs: (most recent): Blood pressure 131/66, pulse 92, temperature 98.8 °F (37.1 °C), temperature source Oral, resp. rate 17, height 177.8 cm (70\"), weight 81 kg (178 lb 9.6 oz), SpO2 96 %.              Results Review:        WBC WBC   Date Value Ref Range Status   10/06/2023 6.97 3.40 - 10.80 10*3/mm3 Final   10/05/2023 8.03 3.40 - 10.80 10*3/mm3 Final   10/04/2023 7.33 3.40 - 10.80 10*3/mm3 Final   10/03/2023 9.80 3.40 - 10.80 10*3/mm3 Final   10/03/2023 13.12 (H) 3.40 - 10.80 10*3/mm3 Final      HGB Hemoglobin   Date Value Ref Range Status   10/06/2023 10.8 (L) 13.0 - 17.7 g/dL Final   10/05/2023 10.2 (L) 13.0 - 17.7 g/dL Final   10/04/2023 10.2 (L) 13.0 - 17.7 g/dL Final   10/03/2023 11.8 (L) 13.0 - 17.7 g/dL Final   10/03/2023 12.5 (L) 13.0 - 17.7 g/dL Final   10/03/2023 10.5 (L) 12.0 - 17.0 g/dL Final   10/03/2023 9.9 (L) 12.0 - 17.0 g/dL Final   10/03/2023 9.5 (L) 12.0 - 17.0 g/dL Final   10/03/2023 9.2 (L) 12.0 - 17.0 g/dL Final   10/03/2023 8.8 (L) 12.0 - 17.0 g/dL Final      HCT Hematocrit   Date Value Ref Range Status   10/06/2023 33.0 (L) 37.5 - 51.0 % Final   10/05/2023 31.3 (L) 37.5 - 51.0 % Final   10/04/2023 30.9 (L) 37.5 - 51.0 % Final "   10/03/2023 35.9 (L) 37.5 - 51.0 % Final   10/03/2023 38.0 37.5 - 51.0 % Final   10/03/2023 31 (L) 38 - 51 % Final   10/03/2023 29 (L) 38 - 51 % Final   10/03/2023 28 (L) 38 - 51 % Final   10/03/2023 27 (L) 38 - 51 % Final   10/03/2023 26 (L) 38 - 51 % Final      Platelets Platelets   Date Value Ref Range Status   10/06/2023 103 (L) 140 - 450 10*3/mm3 Final   10/05/2023 99 (L) 140 - 450 10*3/mm3 Final   10/04/2023 99 (L) 140 - 450 10*3/mm3 Final   10/03/2023 117 (L) 140 - 450 10*3/mm3 Final   10/03/2023 131 (L) 140 - 450 10*3/mm3 Final        PT/INR:    Protime   Date Value Ref Range Status   10/04/2023 15.8 (H) 11.7 - 14.2 Seconds Final   10/03/2023 16.2 (H) 11.7 - 14.2 Seconds Final   /  INR   Date Value Ref Range Status   10/04/2023 1.24 (H) 0.90 - 1.10 Final   10/03/2023 1.29 (H) 0.90 - 1.10 Final       Sodium Sodium   Date Value Ref Range Status   10/06/2023 137 136 - 145 mmol/L Final   10/05/2023 138 136 - 145 mmol/L Final   10/04/2023 142 136 - 145 mmol/L Final   10/03/2023 142 136 - 145 mmol/L Final   10/03/2023 139 136 - 145 mmol/L Final      Potassium Potassium   Date Value Ref Range Status   10/06/2023 4.2 3.5 - 5.2 mmol/L Final   10/05/2023 4.2 3.5 - 5.2 mmol/L Final   10/04/2023 3.9 3.5 - 5.2 mmol/L Final   10/03/2023 4.2 3.5 - 5.2 mmol/L Final   10/03/2023 4.1 3.5 - 5.2 mmol/L Final     Comment:     Slight hemolysis detected by analyzer. Results may be affected.      Chloride Chloride   Date Value Ref Range Status   10/06/2023 102 98 - 107 mmol/L Final   10/05/2023 104 98 - 107 mmol/L Final   10/04/2023 108 (H) 98 - 107 mmol/L Final   10/03/2023 108 (H) 98 - 107 mmol/L Final   10/03/2023 106 98 - 107 mmol/L Final      Bicarbonate CO2   Date Value Ref Range Status   10/06/2023 25.2 22.0 - 29.0 mmol/L Final   10/05/2023 26.0 22.0 - 29.0 mmol/L Final   10/04/2023 24.5 22.0 - 29.0 mmol/L Final   10/03/2023 23.7 22.0 - 29.0 mmol/L Final   10/03/2023 23.1 22.0 - 29.0 mmol/L Final      BUN BUN   Date Value  Ref Range Status   10/06/2023 17 8 - 23 mg/dL Final   10/05/2023 12 8 - 23 mg/dL Final   10/04/2023 15 8 - 23 mg/dL Final   10/03/2023 18 8 - 23 mg/dL Final   10/03/2023 20 8 - 23 mg/dL Final      Creatinine Creatinine   Date Value Ref Range Status   10/06/2023 0.79 0.76 - 1.27 mg/dL Final   10/05/2023 0.75 (L) 0.76 - 1.27 mg/dL Final   10/04/2023 0.83 0.76 - 1.27 mg/dL Final   10/03/2023 0.99 0.76 - 1.27 mg/dL Final   10/03/2023 1.18 0.76 - 1.27 mg/dL Final      Calcium Calcium   Date Value Ref Range Status   10/06/2023 8.9 8.6 - 10.5 mg/dL Final   10/05/2023 8.6 8.6 - 10.5 mg/dL Final   10/04/2023 8.5 (L) 8.6 - 10.5 mg/dL Final   10/03/2023 8.5 (L) 8.6 - 10.5 mg/dL Final   10/03/2023 9.2 8.6 - 10.5 mg/dL Final      Magnesium Magnesium   Date Value Ref Range Status   10/04/2023 2.6 (H) 1.6 - 2.4 mg/dL Final   10/03/2023 2.4 1.6 - 2.4 mg/dL Final   10/03/2023 3.1 (H) 1.6 - 2.4 mg/dL Final          aspirin, 81 mg, Oral, Daily  atorvastatin, 40 mg, Oral, Nightly  chlorhexidine, 15 mL, Mouth/Throat, Q12H  docusate sodium, 100 mg, Oral, BID  enoxaparin, 40 mg, Subcutaneous, Daily  guaiFENesin, 1,200 mg, Oral, Q12H  insulin lispro, 2-9 Units, Subcutaneous, 4x Daily AC & at Bedtime  metoprolol tartrate, 25 mg, Oral, Q12H  mupirocin, , Each Nare, BID  pantoprazole, 40 mg, Oral, QAM  polyethylene glycol, 17 g, Oral, Daily  senna-docusate sodium, 2 tablet, Oral, Nightly      sodium chloride, 30 mL/hr, Last Rate: 30 mL/hr (10/03/23 1130)              Ascending aortic aneurysm    Aortic stenosis due to bicuspid aortic valve      Assessment & Plan    - severe bicuspid aortic valve stenosis- s/p AVR (tissue), CABGx1 LIMA POD#3 Pagni  - ascending aortic aneurysm--4.7 cm per CTA  - severe single vessel CAD--70% LAD  - hypertension  - hyperlipidemia--statin therapy  - DM II--A1c 6.1  -post op anemia-expected acute blood loss     Looks this morning  Up in the chair  On room air-- tolerating  2L NC overnight-- will check an  overnight  Sinus rhythm rate 80s-- tolerated increase in beta blocker-- will increase to 50mg  Encourage pulmonary toilet-- continue mucinex and flutter  Discontinue chest tubes, AV wires   Making great progress may be able to go home with home health in the next few days  Continue routine care     CLEO Koch  10/06/23  08:08 EDT

## 2023-10-07 ENCOUNTER — READMISSION MANAGEMENT (OUTPATIENT)
Dept: CALL CENTER | Facility: HOSPITAL | Age: 73
End: 2023-10-07
Payer: MEDICARE

## 2023-10-07 VITALS
OXYGEN SATURATION: 91 % | RESPIRATION RATE: 17 BRPM | SYSTOLIC BLOOD PRESSURE: 126 MMHG | WEIGHT: 177.03 LBS | DIASTOLIC BLOOD PRESSURE: 68 MMHG | TEMPERATURE: 99.2 F | HEART RATE: 84 BPM | BODY MASS INDEX: 25.34 KG/M2 | HEIGHT: 70 IN

## 2023-10-07 LAB
ANION GAP SERPL CALCULATED.3IONS-SCNC: 7 MMOL/L (ref 5–15)
BUN SERPL-MCNC: 16 MG/DL (ref 8–23)
BUN/CREAT SERPL: 17.2 (ref 7–25)
CALCIUM SPEC-SCNC: 8.9 MG/DL (ref 8.6–10.5)
CHLORIDE SERPL-SCNC: 99 MMOL/L (ref 98–107)
CO2 SERPL-SCNC: 30 MMOL/L (ref 22–29)
CREAT SERPL-MCNC: 0.93 MG/DL (ref 0.76–1.27)
DEPRECATED RDW RBC AUTO: 41.8 FL (ref 37–54)
EGFRCR SERPLBLD CKD-EPI 2021: 86.7 ML/MIN/1.73
ERYTHROCYTE [DISTWIDTH] IN BLOOD BY AUTOMATED COUNT: 14 % (ref 12.3–15.4)
GLUCOSE BLDC GLUCOMTR-MCNC: 117 MG/DL (ref 70–130)
GLUCOSE SERPL-MCNC: 124 MG/DL (ref 65–99)
HCT VFR BLD AUTO: 32.7 % (ref 37.5–51)
HGB BLD-MCNC: 10.7 G/DL (ref 13–17.7)
MCH RBC QN AUTO: 27.2 PG (ref 26.6–33)
MCHC RBC AUTO-ENTMCNC: 32.7 G/DL (ref 31.5–35.7)
MCV RBC AUTO: 83 FL (ref 79–97)
PLATELET # BLD AUTO: 147 10*3/MM3 (ref 140–450)
PMV BLD AUTO: 9.7 FL (ref 6–12)
POTASSIUM SERPL-SCNC: 4.4 MMOL/L (ref 3.5–5.2)
RBC # BLD AUTO: 3.94 10*6/MM3 (ref 4.14–5.8)
SODIUM SERPL-SCNC: 136 MMOL/L (ref 136–145)
WBC NRBC COR # BLD: 6.23 10*3/MM3 (ref 3.4–10.8)

## 2023-10-07 PROCEDURE — 82948 REAGENT STRIP/BLOOD GLUCOSE: CPT

## 2023-10-07 PROCEDURE — 80048 BASIC METABOLIC PNL TOTAL CA: CPT | Performed by: NURSE PRACTITIONER

## 2023-10-07 PROCEDURE — 85027 COMPLETE CBC AUTOMATED: CPT | Performed by: NURSE PRACTITIONER

## 2023-10-07 PROCEDURE — 25010000002 ENOXAPARIN PER 10 MG: Performed by: NURSE PRACTITIONER

## 2023-10-07 PROCEDURE — 94761 N-INVAS EAR/PLS OXIMETRY MLT: CPT

## 2023-10-07 PROCEDURE — 94799 UNLISTED PULMONARY SVC/PX: CPT

## 2023-10-07 RX ORDER — METOPROLOL TARTRATE 50 MG/1
50 TABLET, FILM COATED ORAL EVERY 12 HOURS SCHEDULED
Qty: 60 TABLET | Refills: 2 | Status: SHIPPED | OUTPATIENT
Start: 2023-10-07 | End: 2024-01-05

## 2023-10-07 RX ORDER — POTASSIUM CHLORIDE 20 MEQ/1
20 TABLET, EXTENDED RELEASE ORAL DAILY
Qty: 30 TABLET | Refills: 0 | Status: SHIPPED | OUTPATIENT
Start: 2023-10-07 | End: 2023-11-06

## 2023-10-07 RX ORDER — GUAIFENESIN 600 MG/1
1200 TABLET, EXTENDED RELEASE ORAL EVERY 12 HOURS SCHEDULED
Qty: 120 TABLET | Refills: 0 | Status: SHIPPED | OUTPATIENT
Start: 2023-10-07 | End: 2023-11-06

## 2023-10-07 RX ORDER — HYDROCODONE BITARTRATE AND ACETAMINOPHEN 5; 325 MG/1; MG/1
1 TABLET ORAL EVERY 4 HOURS PRN
Qty: 42 TABLET | Refills: 0 | Status: SHIPPED | OUTPATIENT
Start: 2023-10-07 | End: 2023-10-14

## 2023-10-07 RX ORDER — ASPIRIN 81 MG/1
81 TABLET ORAL DAILY
Qty: 30 TABLET | Refills: 2 | Status: SHIPPED | OUTPATIENT
Start: 2023-10-07 | End: 2024-01-05

## 2023-10-07 RX ORDER — CYCLOBENZAPRINE HCL 5 MG
5 TABLET ORAL EVERY 8 HOURS PRN
Qty: 30 TABLET | Refills: 0 | Status: SHIPPED | OUTPATIENT
Start: 2023-10-07

## 2023-10-07 RX ORDER — FUROSEMIDE 40 MG/1
40 TABLET ORAL DAILY
Qty: 30 TABLET | Refills: 0 | Status: SHIPPED | OUTPATIENT
Start: 2023-10-07 | End: 2023-11-06

## 2023-10-07 RX ADMIN — ASPIRIN 81 MG: 81 TABLET, COATED ORAL at 10:05

## 2023-10-07 RX ADMIN — ENOXAPARIN SODIUM 40 MG: 100 INJECTION SUBCUTANEOUS at 10:07

## 2023-10-07 RX ADMIN — METOPROLOL TARTRATE 50 MG: 50 TABLET, FILM COATED ORAL at 10:05

## 2023-10-07 RX ADMIN — GUAIFENESIN 1200 MG: 600 TABLET, EXTENDED RELEASE ORAL at 10:05

## 2023-10-07 RX ADMIN — PANTOPRAZOLE SODIUM 40 MG: 40 TABLET, DELAYED RELEASE ORAL at 06:43

## 2023-10-07 RX ADMIN — POTASSIUM CHLORIDE 20 MEQ: 750 TABLET, EXTENDED RELEASE ORAL at 10:05

## 2023-10-07 RX ADMIN — FUROSEMIDE 40 MG: 40 TABLET ORAL at 10:05

## 2023-10-07 RX ADMIN — MUPIROCIN 1 APPLICATION: 20 OINTMENT TOPICAL at 10:06

## 2023-10-07 NOTE — PLAN OF CARE
Goal Outcome Evaluation:    A/O x 4, patient stand-by assist. Multiple educational points made to the patient and spouse, due to multiple times of self ambulation, bed alarm set. POD CABG/AVR x 4. No chest tubes or wires. C/o generalized chest soreness and uncomfortable, relieved with oxycodone 10 mg. Possible D/C today. No other questions or concerns.

## 2023-10-07 NOTE — OUTREACH NOTE
Prep Survey      Flowsheet Row Responses   [facility] facility patient discharged from? [place]   Is LACE score < 7 ? No   Eligibility [facility]   Date of Admission 10/03/23   Date of Discharge 10/07/23   Discharge Disposition Home-Health Care Svc   Discharge diagnosis CABG   Does the patient have one of the following disease processes/diagnoses(primary or secondary)? Cardiothoracic surgery   Does the patient have Home health ordered? Yes   What is the Home health agency?  [facility]   Is there a DME ordered? Yes   What DME was ordered? [place] for oxygen   Prep survey completed? Yes            LARA MCCRACKEN - Registered Nurse

## 2023-10-09 ENCOUNTER — TRANSITIONAL CARE MANAGEMENT TELEPHONE ENCOUNTER (OUTPATIENT)
Dept: CALL CENTER | Facility: HOSPITAL | Age: 73
End: 2023-10-09
Payer: MEDICARE

## 2023-10-09 NOTE — OUTREACH NOTE
Call Center TCM Note      Flowsheet Row Responses   [facility] [place] operated by [facility] patient discharged from? [place]   Does the patient have one of the following disease processes/diagnoses(primary or secondary)? Cardiothoracic surgery   TCM attempt successful? Yes   Call start time 1313   Call end time 1318   Discharge diagnosis CABG   Meds reviewed with patient/caregiver? Yes   Is the patient having any side effects they believe may be caused by any medication additions or changes? No   Does the patient have all medications related to this admission filled (includes all antibiotics, pain medications, cardiac medications, etc.) Yes   Is the patient taking all medications as directed (includes completed medication regime)? Yes   Does the patient have an appointment with their PCP within 7-14 days of discharge? No   Nursing Interventions Patient desires to follow up with specialty only, Routed TCM call to PCP office   What is the Home health agency?  [facility]   Home health comments Pt states he has not heard from [facility] as yet, has contact info if needed.   What DME was ordered? [place] for oxygen   Has all DME been delivered? Yes   Psychosocial issues? No   Did the patient receive a copy of their discharge instructions? Yes   Nursing interventions Reviewed instructions with patient   What is the patient's perception of their health status since discharge? Improving   Nursing interventions Nurse provided patient education   Is the patient/caregiver able to teach back normal signs of recovery? Nausea and lack of appetite, Constipation, Depression or irritability, Pain or discomfort at incisional site   Nursing interventions Reassured on normal signs of recovery   Is the patient /caregiver able to teach back basic post-op care? Continue use of incentive spirometry at least 1 week post discharge, Hold pillow to support chest when coughing, Shower daily, Use a clean wash cloth and antibacterial bar or liquid soap  to clean incisions, Lifting as instructed by MD in discharge instructions, Practice cough and deep breath every 4 hours while awake, Drive as instructed by MD in discharge instructions, No tub bath, swimming, or hot tub until instructed by MD, If the steri-strips are falling off, it is okay to remove them. (If applicable)   Is the patient/caregiver able to teach back signs and symptoms of incisional infection? Increased redness, swelling or pain at the incisonal site, Pus or odor from incision, Fever, Increased drainage or bleeding, Incisional warmth   Is the patient/caregiver able to teach back steps to recovery at home? Set small, achievable goals for return to baseline health, Eat a well-balance diet, Rest and rebuild strength, gradually increase activity, Make a list of questions for surgeon's appointment, Practice good oral hygiene, Weigh daily   Is the patient /caregiver able to teach back the importance of cardiac rehab? Yes   If the patient is a current smoker, are they able to teach back resources for cessation? Not a smoker   Is the patient/caregiver able to teach back the hierarchy of who to call/visit for symptoms/problems? PCP, Specialist, Home health nurse, Urgent Care, ED, 911 Yes   TCM call completed? Yes   Wrap up additional comments D/C DX: CABGx1,  AVR - Pt is tired and a little sore but doing fine. Surgical site good. No fever, chills, chest pain. No SOB or GI issues. New rx's all in place. Pt will be calling to sched POST OP with CARDIAC srgn. Pt states he has not heard from [facility] as yet, has contact info if needed. Pt declines TCM APPT with PCP at this time. will keep sched [facility] visit 12/11/2023.   Call end time 1318            Maria M Tang MA    10/9/2023, 13:22 EDT

## 2023-10-10 ENCOUNTER — TELEPHONE (OUTPATIENT)
Dept: INTERNAL MEDICINE | Age: 73
End: 2023-10-10
Payer: MEDICARE

## 2023-10-10 NOTE — CASE MANAGEMENT/SOCIAL WORK
Case Management Discharge Note      Final Note: Home with [facility] and wife.    Provided Post Acute Provider List?: Yes  Post Acute Provider List: Home Health  Delivered To: Patient, Support Person  Support Person: Pt and wife  Method of Delivery: In person    Selected Continued Care - Discharged on 10/7/2023 Admission date: 10/3/2023 - Discharge disposition: Home-Health Care Svc      Destination    No services have been selected for the patient.                Durable Medical Equipment Coordination complete.      Service Provider Selected Services Address Phone Fax Patient Preferred    [facility] [address] [address] [phone] --       Internal Comment last updated by Rosario Cervantes RN 10/7/2023 114    Return call from [facility] and they can provide home O2 today.                         Dialysis/Infusion    No services have been selected for the patient.                Home Medical Care Coordination complete.      Service Provider Selected Services Address Phone Fax Patient Preferred    [facility] [address] [place] [id number] [phone] --              Therapy    No services have been selected for the patient.                Community Resources    No services have been selected for the patient.                Community & DME    No services have been selected for the patient.                    Transportation Services  Private: Car    Final Discharge Disposition Code: 06 - home with home health care

## 2023-10-10 NOTE — TELEPHONE ENCOUNTER
Caller: NORMA-JOHANA    Relationship:     Best call back number: [phone]    What is the best time to reach you: ANY    Who are you requesting to speak with (clinical staff, provider,  specific staff member): DR.KIM KEARNEY    Do you know the name of the person who called: JOHANA    What was the call regarding: JOHANA FROM [facility] IS CALLING TO GET VERBAL ORDERS FOR NURSING.    THEY ARE WANTING TO SEE HIM ONCE A WEEK FOR 6 WEEKS. WITH 2 PRN VISITS.    PLEASE CALL TO CONFIRM

## 2023-10-11 ENCOUNTER — TELEPHONE (OUTPATIENT)
Dept: INTERNAL MEDICINE | Age: 73
End: 2023-10-11
Payer: MEDICARE

## 2023-10-11 NOTE — TELEPHONE ENCOUNTER
JAGRUTI WITH [facility] IS CALLING FOR VERBAL APPROVAL ON HAYES TO BE SEEN  1 X A WEEK FOR 6 WEEKS FOR SKILLED NURSING.

## 2023-10-17 ENCOUNTER — READMISSION MANAGEMENT (OUTPATIENT)
Dept: CALL CENTER | Facility: HOSPITAL | Age: 73
End: 2023-10-17
Payer: MEDICARE

## 2023-10-17 NOTE — OUTREACH NOTE
CT Surgery Week 2 Survey      Flowsheet Row Responses   [facility] patient discharged from? [place]   Does the patient have one of the following disease processes/diagnoses(primary or secondary)? Cardiothoracic surgery   Week 2 attempt successful? No   Unsuccessful attempts Attempt 1            KATHLEEN MCKEON - Registered Nurse

## 2023-10-18 ENCOUNTER — READMISSION MANAGEMENT (OUTPATIENT)
Dept: CALL CENTER | Facility: HOSPITAL | Age: 73
End: 2023-10-18
Payer: MEDICARE

## 2023-10-18 NOTE — OUTREACH NOTE
CT Surgery Week 2 Survey      Flowsheet Row Responses   [facility] patient discharged from? [place]   Does the patient have one of the following disease processes/diagnoses(primary or secondary)? Cardiothoracic surgery   Week 2 attempt successful? No   Unsuccessful attempts Attempt 1            Edgar VILLALTA - Registered Nurse

## 2023-10-19 ENCOUNTER — READMISSION MANAGEMENT (OUTPATIENT)
Dept: CALL CENTER | Facility: HOSPITAL | Age: 73
End: 2023-10-19
Payer: MEDICARE

## 2023-10-19 NOTE — OUTREACH NOTE
CT Surgery Week 2 Survey      Flowsheet Row Responses   [facility] patient discharged from? [place]   Does the patient have one of the following disease processes/diagnoses(primary or secondary)? Cardiothoracic surgery   Week 2 attempt successful? Yes   Call start time 1655   Call end time 1700   Discharge diagnosis CABG   Person spoke with today (if not patient) and relationship Patient   Meds reviewed with patient/caregiver? Yes   Does the patient have all medications related to this admission filled (includes all antibiotics, pain medications, cardiac medications, etc.) Yes   Is the patient taking all medications as directed (includes completed medication regime)? Yes   Does the patient have a primary care provider?  Yes   Does the patient have an appointment scheduled with their C/T surgeon? Yes   Has the patient kept scheduled appointments due by today? Yes   Psychosocial issues? No   Did the patient receive a copy of their discharge instructions? Yes   Nursing interventions Reviewed instructions with patient   What is the patient's perception of their health status since discharge? Improving   Is the patient /caregiver able to teach back basic post-op care? Lifting as instructed by MD in discharge instructions   Is the patient/caregiver able to teach back steps to recovery at home? Set small, achievable goals for return to baseline health, Rest and rebuild strength, gradually increase activity, Eat a well-balance diet   If the patient is a current smoker, are they able to teach back resources for cessation? Not a smoker   Is the patient/caregiver able to teach back the hierarchy of who to call/visit for symptoms/problems? PCP, Specialist, Home health nurse, Urgent Care, ED, 911 Yes   Week 2 call completed? Yes   Is the patient interested in additional calls from an ambulatory ? No   Would this patient benefit from a Referral to Amb Social Work? No   Call end time 1700            KATHLEEN Ribera  Registered Nurse

## 2023-10-20 ENCOUNTER — OFFICE VISIT (OUTPATIENT)
Dept: CARDIOLOGY | Facility: CLINIC | Age: 73
End: 2023-10-20
Payer: MEDICARE

## 2023-10-20 VITALS
BODY MASS INDEX: 24.48 KG/M2 | OXYGEN SATURATION: 97 % | SYSTOLIC BLOOD PRESSURE: 120 MMHG | DIASTOLIC BLOOD PRESSURE: 80 MMHG | HEIGHT: 70 IN | WEIGHT: 171 LBS | HEART RATE: 69 BPM

## 2023-10-20 DIAGNOSIS — I11.0 HYPERTENSIVE HEART DISEASE WITH HEART FAILURE: ICD-10-CM

## 2023-10-20 DIAGNOSIS — Q23.0 AORTIC STENOSIS DUE TO BICUSPID AORTIC VALVE: Chronic | ICD-10-CM

## 2023-10-20 DIAGNOSIS — Z95.2 S/P AVR (AORTIC VALVE REPLACEMENT): Primary | ICD-10-CM

## 2023-10-20 DIAGNOSIS — I71.21 ANEURYSM OF ASCENDING AORTA WITHOUT RUPTURE: ICD-10-CM

## 2023-10-20 DIAGNOSIS — Q23.1 AORTIC STENOSIS DUE TO BICUSPID AORTIC VALVE: Chronic | ICD-10-CM

## 2023-10-20 NOTE — PATIENT INSTRUCTIONS
We will get blood work today.    Keep taking yoour blood pressure every day, and record it. You can take weight as well.     I think you can stop the lasix and the potassium medications. As well as the mucinex.     We might need to add your blood pressure medication back if your blood pressure elevates. I do not see the need for further oxygen     You will need to do cardiac rehab after you get your postop followup with CV surgery

## 2023-10-20 NOTE — PROGRESS NOTES
[facility]    Subjective:     Encounter Date:10/20/23      Patient ID: Jeremi Shankar Sr. is a 73 y.o. male.    Chief Complaint: No chief complaint on file.  Follow-up after AVR  History of Present Illness    Mr. Shankar presents for further evaluation.  He was noted to have a bicuspid aortic valve despite being physically active, and underwent a echocardiogram which revealed borderline very severe aortic stenosis.  He had an ascending thoracic aorta of 4.7 to 4.2 cm, and with these findings he subsequently underwent AVR.  He tolerated the procedure very well and presents today for follow-up.  His physical activity levels are returning.  His wife is present today does state that he has lost about 16 pounds since his surgery, he has never had any lower extremity edema, he no longer requires oxygen at night, and otherwise he is recovering quite well.  He has no other complaints today.  He does have a small scab at the proximal portion of his midline incision, but otherwise no drainage or erythema or any other complaints.    - severe bicuspid aortic valve stenosis- s/p AVR (tissue), CABGx1 LIMA--Pagni  - ascending aortic aneurysm--4.2 cm per CTA.  Did not require intervention will be monitored as an outpatient.  - severe single vessel CAD--70% LAD  - hypertension  - hyperlipidemia--statin therapy  - DM II--A1c 6.1  -post op anemia-expected acute blood loss         The following portions of the patient's history were reviewed and updated as appropriate: allergies, current medications, past family history, past medical history, past social history, past surgical history and problem list.    Past Medical History:   Diagnosis Date    Aortic stenosis 09/29/2023    Aortic stenosis due to bicuspid aortic valve     Arthritis 2019    arthritis is in my hands    Benign essential hypertension     Bilateral carpal tunnel syndrome 06/16/2020    Cataract 2021    Eye doctor saw them in exam    CHF  (congestive heart failure)     Colon polyp 2014 or 2015    Diverticulosis of large intestine 03/06/2016    Description: March 2015????????colonoscopy????????Dr. Francis????????diverticulosis/colon polyps (2).    Headache 1965    History of transfusion     Hyperlipidemia     Type 2 diabetes mellitus     Visual impairment     glasses for reading       Past Surgical History:   Procedure Laterality Date    ASCENDING AORTIC ANEURYSM REPAIR W/ MECHANICAL AORTIC VALVE REPLACEMENT N/A 10/3/2023    Procedure: INTRAOPERATIVE RAYRAY; STERNOTOMY; CORONARY ARTERY BYPASS GRAFT TIMES ONE USING LEFT INTERNAL MAMMARY ARTERY; AORTIC VALVE REPLACEMENT; NEURO MONITORING AND PRP.;  Surgeon: Benton Mcduffie MD;  Location: [facility];  Service: Cardiothoracic;  Laterality: N/A;    CARDIAC CATHETERIZATION N/A 8/8/2023    Procedure: Right Heart Cath;  Surgeon: Kun Desai MD;  Location:  [facility];  Service: Cardiovascular;  Laterality: N/A;    CARDIAC CATHETERIZATION N/A 8/8/2023    Procedure: Coronary angiography;  Surgeon: Kun Desai MD;  Location:  [facility];  Service: Cardiovascular;  Laterality: N/A;    CARPAL TUNNEL RELEASE Left 05/2022    COLONOSCOPY  2007    Dr Francis --WNL.  March 2015â€“Dr. Hernandez€“colon polyps (2); diverticulosis.    COLONOSCOPY  2014?    polyps--DR Francis    HERNIA REPAIR      (R) REPAIR TWICE           ECG 12 Lead    Date/Time: 10/20/2023 9:54 AM  Performed by: Nash Grey MD    Authorized by: Nash Grey MD  Comparison: compared with previous ECG from 7/11/2023  Similar to previous ECG  Comparison to previous ECG: There is a prominent T wave inversion isolated in lead V2 which is new when compared to prior EKG, likely postoperative changes.  Rhythm: sinus rhythm  Rate: normal  Conduction: 1st degree AV block  ST Segments: ST segments normal  T inversion: V2  QRS axis: normal  Other findings: non-specific ST-T wave changes    Clinical impression: abnormal  "EKG             Objective:     Vitals:    10/20/23 0944   BP: 120/80   Pulse: 69   SpO2: 97%   Weight: 77.6 kg (171 lb)   Height: 177.8 cm (70\")         Constitutional:       Appearance: Healthy appearance. Not in distress.   Neck:      Vascular: JVD normal.   Pulmonary:      Effort: Pulmonary effort is normal.      Breath sounds: Normal breath sounds.   Cardiovascular:      PMI at left midclavicular line. Normal rate. Regular rhythm. Normal S2.       Murmurs: There is no murmur.      Comments: I do not appreciate a systolic murmur on today's exam.  Median sternotomy incision well-healing.  Pulses:     Intact distal pulses.   Edema:     Peripheral edema absent.   Skin:     General: Skin is warm and dry.   Neurological:      General: No focal deficit present.      Mental Status: Alert, oriented to person, place, and time and oriented to person, place and time.   Psychiatric:         Mood and Affect: Mood and affect normal.         Lab Review:     Lipid Panel          5/9/2023    09:12 9/29/2023    08:22   Lipid Panel   Total Cholesterol  127    Total Cholesterol 149     Triglycerides 142  115    HDL Cholesterol 45  48    VLDL Cholesterol 25  21    LDL Cholesterol  79  58    LDL/HDL Ratio  1.17      BUN   Date Value Ref Range Status   10/07/2023 16 8 - 23 mg/dL Final     Creatinine   Date Value Ref Range Status   10/07/2023 0.93 0.76 - 1.27 mg/dL Final   08/01/2023 1.00 0.60 - 1.30 mg/dL Final     Comment:     Serial Number: [id number]:  [id number]     Potassium   Date Value Ref Range Status   10/07/2023 4.4 3.5 - 5.2 mmol/L Final     ALT (SGPT)   Date Value Ref Range Status   09/29/2023 16 1 - 41 U/L Final     AST (SGOT)   Date Value Ref Range Status   09/29/2023 19 1 - 40 U/L Final         Performed        Assessment:          Diagnosis Plan   1. S/P AVR (aortic valve replacement)  Adult Transthoracic Echo Complete w/ Color, Spectral and Contrast if Necessary Per Protocol      2. Aortic stenosis due to bicuspid " aortic valve  Basic Metabolic Panel    CBC & Differential    BNP      3. Aneurysm of ascending aorta without rupture  Adult Transthoracic Echo Complete w/ Color, Spectral and Contrast if Necessary Per Protocol      4. Hypertensive heart disease with heart failure  BNP             Plan:         Very severe bicuspid aortic stenosis status post AVR 27 mm pericardial prosthesis, CABG LIMA-LAD with Dr. Mcduffie October 3, 2023: Recovering well.  Continue monitoring blood pressures.  Stop Lasix and potassium, he appears euvolemic  Continue metoprolol for hypertensive control.  He was previously on losartan and hydrochlorothiazide, will monitor closely with home blood pressure recordings and should he need any additional blood pressure help.  Continue Lipitor and Zetia for CAD and his LIMA graft  He has follow-up with CV surgery next month, would likely be cleared for cardiac rehab at that time.  He is amenable to proceeding with cardiac rehab.  Repeat labs today.  I do worry a bit about overdiuresis with his daily 40 Lasix and his weight loss  Continued nutrition with boost shakes.  We will obtain baseline echocardiogram for gradients  Will need dental prophylaxis before any dental cleanings  Aspirin 81 for life  Hyperlipidemia: Well-controlled on Zetia and Lipitor per above.  Bicuspid aortic stenosis with a sending thoracic aorta dilation.  He follows with Dr. Mcduffie.  Most recent evaluation showed a proximal aorta of 4.2 cm on Intra-Op RAYRAY.  He is to follow with Dr. Mcduffie for further monitoring of his ascending thoracic aorta as this was not replaced.  Patient's adult children would benefit from screening TTE given bicuspid valve, will discuss next visit.  Hypertension: Encouraged continued home blood pressure recordings, should it begin to elevate would likely add losartan back.       RTC 3 months for symptom check-in.  We will need to further discuss screening echoes with his adult children if that is not done yet.   Continue to follow-up with CV surgery and likely refer to    Nash Grey MD  [facility]  10/20/23  09:54 EDT       Current Outpatient Medications:     aspirin 81 MG EC tablet, Take 1 tablet by mouth Daily for 90 days., Disp: 30 tablet, Rfl: 2    atorvastatin (LIPITOR) 40 MG tablet, TAKE 1 TABLET EVERY NIGHT, Disp: 90 tablet, Rfl: 3    cyclobenzaprine (FLEXERIL) 5 MG tablet, Take 1 tablet by mouth Every 8 (Eight) Hours As Needed for Muscle Spasms., Disp: 30 tablet, Rfl: 0    ezetimibe (ZETIA) 10 MG tablet, TAKE 1 TABLET DAILY, Disp: 90 tablet, Rfl: 3    metFORMIN ER (GLUCOPHAGE-XR) 500 MG 24 hr tablet, TAKE 1 TABLET BY MOUTH WITH DINNER, Disp: 90 tablet, Rfl: 3    metoprolol tartrate (LOPRESSOR) 50 MG tablet, Take 1 tablet by mouth Every 12 (Twelve) Hours for 90 days., Disp: 60 tablet, Rfl: 2  No current facility-administered medications for this visit.    Facility-Administered Medications Ordered in Other Visits:     Chlorhexidine Gluconate Cloth 2 % pads 1 application , 1 application , Topical, Q12H PRN, Rosa M Roy APRN         Return in about 3 months (around 1/20/2024).      Part of this note may be an electronic transcription/translation of spoken language to printed text using the Dragon Dictation System.

## 2023-10-25 ENCOUNTER — READMISSION MANAGEMENT (OUTPATIENT)
Dept: CALL CENTER | Facility: HOSPITAL | Age: 73
End: 2023-10-25
Payer: MEDICARE

## 2023-10-25 NOTE — OUTREACH NOTE
CT Surgery Week 3 Survey      Flowsheet Row Responses   [facility] patient discharged from? [place]   Does the patient have one of the following disease processes/diagnoses(primary or secondary)? Cardiothoracic surgery   Week 3 attempt successful? Yes   Call start time 1412   Call end time 1415   Meds reviewed with patient/caregiver? Yes   Is the patient taking all medications as directed (includes completed medication regime)? Yes   Comments regarding appointments Pt to see surgeon on November 8, 2023.   Has the patient kept scheduled appointments due by today? N/A   Has home health visited the patient within 72 hours of discharge? Yes   What is the patient's perception of their health status since discharge? Improving   Week 3 call completed? Yes   Wrap up additional comments Pt reports improving.  continues to visit. Pt to see surgeon on November 8, 2023.   Call end time 1415            Beatris LAWSON - Registered Nurse

## 2023-10-30 ENCOUNTER — OUTSIDE FACILITY SERVICE (OUTPATIENT)
Dept: CARDIAC SURGERY | Facility: CLINIC | Age: 73
End: 2023-10-30
Payer: MEDICARE

## 2023-11-02 DIAGNOSIS — R73.03 PREDIABETES: ICD-10-CM

## 2023-11-02 RX ORDER — METFORMIN HYDROCHLORIDE 500 MG/1
TABLET, EXTENDED RELEASE ORAL
Qty: 90 TABLET | Refills: 1 | Status: SHIPPED | OUTPATIENT
Start: 2023-11-02

## 2023-11-08 ENCOUNTER — OFFICE VISIT (OUTPATIENT)
Dept: CARDIAC SURGERY | Facility: CLINIC | Age: 73
End: 2023-11-08
Payer: MEDICARE

## 2023-11-08 VITALS
HEIGHT: 70 IN | BODY MASS INDEX: 23.77 KG/M2 | OXYGEN SATURATION: 96 % | SYSTOLIC BLOOD PRESSURE: 153 MMHG | WEIGHT: 166 LBS | RESPIRATION RATE: 20 BRPM | HEART RATE: 78 BPM | TEMPERATURE: 97.5 F | DIASTOLIC BLOOD PRESSURE: 89 MMHG

## 2023-11-08 DIAGNOSIS — Z95.2 S/P AVR (AORTIC VALVE REPLACEMENT): Primary | ICD-10-CM

## 2023-11-08 DIAGNOSIS — Z95.1 S/P CABG (CORONARY ARTERY BYPASS GRAFT): ICD-10-CM

## 2023-11-08 PROCEDURE — 1159F MED LIST DOCD IN RCRD: CPT

## 2023-11-08 PROCEDURE — 1160F RVW MEDS BY RX/DR IN RCRD: CPT

## 2023-11-08 PROCEDURE — 3079F DIAST BP 80-89 MM HG: CPT

## 2023-11-08 PROCEDURE — 99024 POSTOP FOLLOW-UP VISIT: CPT

## 2023-11-08 PROCEDURE — 3077F SYST BP >= 140 MM HG: CPT

## 2023-11-08 RX ORDER — METOPROLOL TARTRATE 50 MG/1
50 TABLET, FILM COATED ORAL EVERY 12 HOURS SCHEDULED
Qty: 60 TABLET | Refills: 2 | Status: SHIPPED | OUTPATIENT
Start: 2023-11-08 | End: 2024-02-06

## 2023-11-08 NOTE — PROGRESS NOTES
"CARDIOVASCULAR SURGERY FOLLOW-UP PROGRESS NOTE  Chief Complaint: Postop      HPI:   Dear Jhon Castellano MD and colleagues:    It was nice to see Jeremi GARDNER Raad Carrizales in follow up four weeks after surgery.  As you know, he is a 73 y.o. male with a history of bicuspid aortic valve stenosis, CAD, ascending aortic aneurysm, hypertension, hyperlipidemia, diabetes mellitus type 2. On 10/3 he underwent CABG x1 and AVR with Dr. Mcduffie. He did well postoperatively and continues to do well. He comes in today complaining of nothing.  His activity level has been good.  From a surgical standpoint, the sternal incision is well approximated without erythema, edema, or drainage.  The sternum is stable to palpation, and the patient denies any popping or clicking with deep inspiration or coughing.      Physical Exam:         /89 (BP Location: Left arm, Patient Position: Sitting, Cuff Size: Adult)   Pulse 78   Temp 97.5 °F (36.4 °C)   Resp 20   Ht 177.8 cm (70\")   Wt 75.3 kg (166 lb)   SpO2 96%   BMI 23.82 kg/m²   Heart:  regular rate and rhythm, S1, S2 normal, no murmur, click, rub or gallop  Lungs:  clear to auscultation bilaterally  Extremities:  no edema  Incision(s):  mid chest healing well, no significant drainage, no dehiscence, no significant erythema    Assessment/Plan:     S/P CABG and AVR. Overall, he is doing well.  Blood pressure is elevated in office today, however he brings me a log that shows that his blood pressures are consistently in the 130 systolic.  He recently followed up with his cardiologist and his Lasix and potassium were stopped which I think is appropriate.  Otherwise he is doing well and I will make any changes.  He can follow-up with us in our aortic clinic.  Sternal precautions reviewed as well as signs and symptoms of sternal wound infection.  Questions answered with verbalized understanding.    Post-op respiratory insufficiency--discharged on nocturnal home oxygen    No heavy lifting " > 10 pounds for 2 more weeks  Keep incisions clean and dry  OK to drive if not taking narcotic pain medicine  OK to begin cardiac rehab  Follow-up as scheduled with cardiology  Follow-up as scheduled with PCP  Follow-up with CT surgery in our aortic clinic for repeat CT of the chest per Dr. Mcduffie  Lifelong antibiotics prior to dental procedures    Continue lifting restriction of 10 lbs until 6 weeks and 50 lbs until 12 weeks from the date of surgery, no excessive jarring motions or twisting motions until 12 weeks from the date of surgery    Return to clinic if any signs or symptoms of infection or sternal instability develop     Thank you for allowing me to participate in the care of your patient.      Ashlie Elkins APRN

## 2023-11-20 ENCOUNTER — HOSPITAL ENCOUNTER (OUTPATIENT)
Dept: CARDIOLOGY | Facility: HOSPITAL | Age: 73
Discharge: HOME | End: 2023-11-20
Admitting: STUDENT IN AN ORGANIZED HEALTH CARE EDUCATION/TRAINING PROGRAM
Payer: MEDICARE

## 2023-11-20 ENCOUNTER — TELEPHONE (OUTPATIENT)
Dept: CARDIOLOGY | Facility: CLINIC | Age: 73
End: 2023-11-20
Payer: MEDICARE

## 2023-11-20 VITALS
SYSTOLIC BLOOD PRESSURE: 158 MMHG | HEIGHT: 70 IN | OXYGEN SATURATION: 96 % | HEART RATE: 56 BPM | BODY MASS INDEX: 23.77 KG/M2 | WEIGHT: 166 LBS | DIASTOLIC BLOOD PRESSURE: 82 MMHG

## 2023-11-20 DIAGNOSIS — I71.21 ANEURYSM OF ASCENDING AORTA WITHOUT RUPTURE: ICD-10-CM

## 2023-11-20 DIAGNOSIS — Z95.2 S/P AVR (AORTIC VALVE REPLACEMENT): ICD-10-CM

## 2023-11-20 LAB
AORTIC ARCH: 2.5 CM
AORTIC DIMENSIONLESS INDEX: 0.8 (DI)
ASCENDING AORTA: 2.9 CM
BH CV ECHO MEAS - AO MAX PG: 9 MMHG
BH CV ECHO MEAS - AO MEAN PG: 4.9 MMHG
BH CV ECHO MEAS - AO V2 MAX: 149.8 CM/SEC
BH CV ECHO MEAS - AO V2 VTI: 33.9 CM
BH CV ECHO MEAS - AVA(I,D): 1.73 CM2
BH CV ECHO MEAS - EDV(CUBED): 79.5 ML
BH CV ECHO MEAS - EDV(MOD-SP2): 108 ML
BH CV ECHO MEAS - EDV(MOD-SP4): 135 ML
BH CV ECHO MEAS - EF(MOD-BP): 57.5 %
BH CV ECHO MEAS - EF(MOD-SP2): 51.9 %
BH CV ECHO MEAS - EF(MOD-SP4): 63.7 %
BH CV ECHO MEAS - ESV(CUBED): 29.9 ML
BH CV ECHO MEAS - ESV(MOD-SP2): 52 ML
BH CV ECHO MEAS - ESV(MOD-SP4): 49 ML
BH CV ECHO MEAS - FS: 27.8 %
BH CV ECHO MEAS - IVS/LVPW: 1 CM
BH CV ECHO MEAS - IVSD: 0.8 CM
BH CV ECHO MEAS - LAT PEAK E' VEL: 7.7 CM/SEC
BH CV ECHO MEAS - LV DIASTOLIC VOL/BSA (35-75): 70 CM2
BH CV ECHO MEAS - LV MASS(C)D: 105.3 GRAMS
BH CV ECHO MEAS - LV MAX PG: 2.2 MMHG
BH CV ECHO MEAS - LV MEAN PG: 1 MMHG
BH CV ECHO MEAS - LV SYSTOLIC VOL/BSA (12-30): 25.4 CM2
BH CV ECHO MEAS - LV V1 MAX: 74.2 CM/SEC
BH CV ECHO MEAS - LV V1 VTI: 18.4 CM
BH CV ECHO MEAS - LVIDD: 4.3 CM
BH CV ECHO MEAS - LVIDS: 3.1 CM
BH CV ECHO MEAS - LVOT AREA: 3.2 CM2
BH CV ECHO MEAS - LVOT DIAM: 2.02 CM
BH CV ECHO MEAS - LVPWD: 0.8 CM
BH CV ECHO MEAS - MED PEAK E' VEL: 5.9 CM/SEC
BH CV ECHO MEAS - MR MAX PG: 44.6 MMHG
BH CV ECHO MEAS - MR MAX VEL: 333.9 CM/SEC
BH CV ECHO MEAS - MV A DUR: 0.12 SEC
BH CV ECHO MEAS - MV A MAX VEL: 69.9 CM/SEC
BH CV ECHO MEAS - MV DEC SLOPE: 236.6 CM/SEC2
BH CV ECHO MEAS - MV DEC TIME: 0.19 SEC
BH CV ECHO MEAS - MV E MAX VEL: 83.6 CM/SEC
BH CV ECHO MEAS - MV E/A: 1.2
BH CV ECHO MEAS - MV MAX PG: 2.7 MMHG
BH CV ECHO MEAS - MV MEAN PG: 1.1 MMHG
BH CV ECHO MEAS - MV P1/2T: 99.1 MSEC
BH CV ECHO MEAS - MV V2 VTI: 27.1 CM
BH CV ECHO MEAS - MVA(P1/2T): 2.22 CM2
BH CV ECHO MEAS - MVA(VTI): 2.17 CM2
BH CV ECHO MEAS - PA ACC TIME: 0.14 SEC
BH CV ECHO MEAS - PA V2 MAX: 93.7 CM/SEC
BH CV ECHO MEAS - QP/QS: 0.8
BH CV ECHO MEAS - RAP SYSTOLE: 3 MMHG
BH CV ECHO MEAS - RV MAX PG: 1.01 MMHG
BH CV ECHO MEAS - RV V1 MAX: 50.1 CM/SEC
BH CV ECHO MEAS - RV V1 VTI: 13.4 CM
BH CV ECHO MEAS - RVOT DIAM: 2.12 CM
BH CV ECHO MEAS - RVSP: 18 MMHG
BH CV ECHO MEAS - SI(MOD-SP2): 29 ML/M2
BH CV ECHO MEAS - SI(MOD-SP4): 44.6 ML/M2
BH CV ECHO MEAS - SV(LVOT): 58.7 ML
BH CV ECHO MEAS - SV(MOD-SP2): 56 ML
BH CV ECHO MEAS - SV(MOD-SP4): 86 ML
BH CV ECHO MEAS - SV(RVOT): 47.1 ML
BH CV ECHO MEAS - TAPSE (>1.6): 0.88 CM
BH CV ECHO MEAS - TR MAX PG: 15.1 MMHG
BH CV ECHO MEAS - TR MAX VEL: 194.1 CM/SEC
BH CV ECHO MEASUREMENTS AVERAGE E/E' RATIO: 12.29
BH CV XLRA - RV BASE: 2.6 CM
BH CV XLRA - RV LENGTH: 7.1 CM
BH CV XLRA - RV MID: 2.7 CM
BH CV XLRA - TDI S': 4.7 CM/SEC
LEFT ATRIUM VOLUME INDEX: 30.6 ML/M2
SINUS: 3.4 CM
STJ: 2.6 CM

## 2023-11-20 PROCEDURE — 93306 TTE W/DOPPLER COMPLETE: CPT | Performed by: INTERNAL MEDICINE

## 2023-11-20 PROCEDURE — 93306 TTE W/DOPPLER COMPLETE: CPT

## 2023-11-20 NOTE — TELEPHONE ENCOUNTER
Called and spoke with patient. Went over results. He verbalized understanding.    Dr. Grey,    Pt wanted to know if he is supposed to get set up for cardiac rehab. He said he is doing well without it, but wanted to know if that is something you feel he needs to do.    Thank you,    Manisha Stewart, RN  Triage [facility]  11/20/23 12:58 EST

## 2023-11-20 NOTE — PROGRESS NOTES
Can we please call patient and let him know that his new heart ultrasound is excellent.  The aortic stenosis has resolved.  The new prosthetic valve is working very well, and the gradients are normal.  Overall this is a very reassuring ultrasound.  Thank you for the help.

## 2023-11-20 NOTE — TELEPHONE ENCOUNTER
Yes.  We recommend cardiac rehab generally for all patients after valve replacement.  We typically start it 6 weeks out from surgery if patients are doing well.  It appears the cardiac surgeons cleared him to start cardiac rehab but not highly recommend he go through with it.

## 2023-11-20 NOTE — TELEPHONE ENCOUNTER
----- Message from Nash Grey MD sent at 11/20/2023 12:43 PM EST -----  Can we please call patient and let him know that his new heart ultrasound is excellent.  The aortic stenosis has resolved.  The new prosthetic valve is working very well, and the gradients are normal.  Overall this is a very reassuring ultrasound.  Thank you for the help.

## 2023-11-20 NOTE — TELEPHONE ENCOUNTER
Notified pt of recommendations from Dr. Grey. He verbalized understanding.    Thank you,    Manisha Stewart, RN  Triage [facility]  11/20/23 14:28 EST

## 2023-12-11 ENCOUNTER — OFFICE VISIT (OUTPATIENT)
Dept: INTERNAL MEDICINE | Age: 73
End: 2023-12-11
Payer: MEDICARE

## 2023-12-11 VITALS
WEIGHT: 177 LBS | BODY MASS INDEX: 25.34 KG/M2 | OXYGEN SATURATION: 98 % | HEIGHT: 70 IN | SYSTOLIC BLOOD PRESSURE: 158 MMHG | HEART RATE: 59 BPM | TEMPERATURE: 97.1 F | DIASTOLIC BLOOD PRESSURE: 90 MMHG

## 2023-12-11 DIAGNOSIS — Z00.00 MEDICARE ANNUAL WELLNESS VISIT, SUBSEQUENT: Primary | ICD-10-CM

## 2023-12-11 DIAGNOSIS — I10 PRIMARY HYPERTENSION: Chronic | ICD-10-CM

## 2023-12-11 DIAGNOSIS — Z95.2 S/P AVR (AORTIC VALVE REPLACEMENT): Chronic | ICD-10-CM

## 2023-12-11 DIAGNOSIS — R73.03 PREDIABETES: Chronic | ICD-10-CM

## 2023-12-11 DIAGNOSIS — E78.2 MIXED HYPERLIPIDEMIA: Chronic | ICD-10-CM

## 2023-12-11 DIAGNOSIS — Z12.5 ENCOUNTER FOR SCREENING FOR MALIGNANT NEOPLASM OF PROSTATE: ICD-10-CM

## 2023-12-11 PROBLEM — Q23.1 AORTIC STENOSIS DUE TO BICUSPID AORTIC VALVE: Chronic | Status: RESOLVED | Noted: 2022-12-09 | Resolved: 2023-12-11

## 2023-12-11 PROBLEM — I35.0 AORTIC STENOSIS DUE TO BICUSPID AORTIC VALVE: Chronic | Status: RESOLVED | Noted: 2022-12-09 | Resolved: 2023-12-11

## 2023-12-11 PROBLEM — Q23.81 AORTIC STENOSIS DUE TO BICUSPID AORTIC VALVE: Chronic | Status: RESOLVED | Noted: 2022-12-09 | Resolved: 2023-12-11

## 2023-12-11 PROBLEM — Q23.0 AORTIC STENOSIS DUE TO BICUSPID AORTIC VALVE: Chronic | Status: RESOLVED | Noted: 2022-12-09 | Resolved: 2023-12-11

## 2023-12-11 LAB
HBA1C MFR BLD: 6.1 % (ref 4.8–5.6)
PSA SERPL-MCNC: 4.35 NG/ML (ref 0–4)

## 2023-12-11 NOTE — PATIENT INSTRUCTIONS
** IMPORTANT MESSAGE FROM DR. HE **    In our office, your satisfaction is VERY important to us.     You may receive a survey from Maile Browne by mail or E-mail for you to provide feedback about your visit. This information is invaluable for me to know what we can do to improve our services.     I ask that you please take a few minutes to complete the survey and let us know how we are doing in serving your needs. (You may receive the survey more than once for multiple visits)    Thank You !    Dr. He    _________________________________________________________________________________________________________________________      ** ADDITIONAL INSTRUCTION / REMINDERS FROM DR. HE **    OBTAIN THESE VACCINES AT THE PHARMACY:    COVID-19 booster shot, RSV, Hepatitis A Series, and Tdap (Adacel)

## 2023-12-11 NOTE — LETTER
[facility]  Vaccine Consent Form    Patient Name:  Jeremi Shankar Milagros  Patient :  1950     Vaccine(s) Ordered    Fluzone High-Dose 65+yrs ([id number])        Screening Checklist  The following questions should be completed prior to vaccination. If you answer “yes” to any question, it does not necessarily mean you should not be vaccinated. It just means we may need to clarify or ask more questions. If a question is unclear, please ask your healthcare provider to explain it.    Yes No   Any fever or moderate to severe illness today (mild illness and/or antibiotic treatment are not contraindications)?     Do you have a history of a serious reaction to any previous vaccinations, such as anaphylaxis, encephalopathy within 7 days, Guillain-[place] syndrome within 6 weeks, seizure?     Have you received any live vaccine(s) (e.g MMR, GERALD) or any other vaccines in the last month (to ensure duplicate doses aren't given)?     Do you have an anaphylactic allergy to latex (DTaP, DTaP-IPV, Hep A, Hep B, MenB, RV, Td, Tdap), baker’s yeast (Hep B, HPV), polysorbates (RSV, nirsevimab, PCV 20, Rotavirrus, Tdap, Shingrix), or gelatin (GERALD, MMR)?     Do you have an anaphylactic allergy to neomycin (Rabies, GERALD, MMR, IPV, Hep A), polymyxin B (IPV), or streptomycin (IPV)?      Any cancer, leukemia, AIDS, or other immune system disorder? (GERALD, MMR, RV)     Do you have a parent, brother, or sister with an immune system problem (if immune competence of vaccine recipient clinically verified, can proceed)? (MMR, GERALD)     Any recent steroid treatments for >2 weeks, chemotherapy, or radiation treatment? (GERALD, MMR)     Have you received antibody-containing blood transfusions or IVIG in the past 11 months (recommended interval is dependent on product)? (MMR, GERALD)     Have you taken antiviral drugs (acyclovir, famciclovir, valacyclovir for GERALD) in the last 24 or 48 hours, respectively?      Are you pregnant or planning to become  pregnant within 1 month? (GERALD, MMR, HPV, IPV, MenB, Abrexvy; For Hep B- refer to Engerix-B; For RSV - Abrysvo is indicated for 32-36 weeks of pregnancy from September to January)     For infants, have you ever been told your child has had intussusception or a medical emergency involving obstruction of the intestine (Rotavirus)? If not for an infant, can skip this question.         *Ordering Physician/APC should be consulted if “yes” is checked by the patient or guardian above.      I have received, read, and understand the Vaccine Information Statement (VIS) for each vaccine ordered above.  I have considered my health status as well as the health status of my close contacts.  I have taken the opportunity to discuss my vaccine questions with my health care provider.   I have requested that the ordered vaccine(s) be given to me.  I understand the benefits and risks of the vaccines.  I understand that I should remain in the clinic for 15 minutes after receiving the vaccine(s).  _________________________________________________________  Signature of Patient or Parent/Legal Guardian ____________________  Date

## 2023-12-11 NOTE — ASSESSMENT & PLAN NOTE
BP Readings from Last 3 Encounters:   12/11/23 158/90   11/20/23 158/82   11/08/23 153/89      Blood pressure is higher here but reportedly lower at home.   Send BP readings in 2 weeks.   Currently taking metoprolol tartrate 50 mg BID.

## 2023-12-11 NOTE — ASSESSMENT & PLAN NOTE
Lab Results   Component Value Date    LDL 58 09/29/2023    LDL 79 05/09/2023    LDL 84 11/22/2021    TRIG 115 09/29/2023    CHOLHDLRATIO 3.31 05/09/2023      Continue atorvastatin 40 mg and ezetimibe 10 mg daily

## 2023-12-11 NOTE — PROGRESS NOTES
I N T E R N A L  M E D I C I N E    J U N O H  K I M,  M D      ENCOUNTER DATE:  12/11/2023    Jeremi Shankar Sr. / 73 y.o. / male    MEDICARE ANNUAL WELLNESS VISIT       Chief Complaint:    Chief Complaint   Patient presents with    Medicare Wellness-subsequent     12/9/22    chronic medical problems       Patient's general assessment of his health since a year ago:     - Compared to one year ago, he feels his physical health is:   [x] About the same  [] Better  [] Little worse  [] Significantly worse  []     - Compared to one year ago, he feels his mental health is   [x] About the same  [] Better  [] Little worse  [] Significantly worse  []     HPI for other active medical problems:     Recently underwent CABG x1 and bioprosthetic aortic valve replacement with Dr. Mcduffie on 10/03/2023. He is still on a lifting restriction but is doing well overall. His blood pressure has been consistently elevated on his last 3 visits. He monitors his blood pressure approximately 2 times per week and notes his systolic pressure is usually in the 130s to low 140s. He denies any chest pain or lower extremity swelling. Currently takes aspirin 81 mg daily and metoprolol tartrate 50 mg twice daily. His heart rate is in the 50s today.    His weight is stable. He had lost some weight after his surgery but notes he gained it back being less active. He is trying to eat more healthily. On metformin 500 mg once daily for prediabetes.    He is currently taking atorvastatin 40 mg and Zetia 10 mg daily for hyperlipidemia.    His last PSA screening was done in 06/2022. He would like to continue monitoring this.     Patient denies depression symptoms. He is independent. No hearing loss. He is due for a vision exam. Denies any recent falls. He does not have a living will.       HISTORY       Recent Hospitalizations:    Recent hospitalization?:     If YES, location, date, and diagnoses:     Location:   Date:   Principle Discharge Dx:    Secondary Dx:       Patient Care Team:    Patient Care Team:  Jhon Alvarez MD as PCP - General (Internal Medicine)  Nash Grey MD as Consulting Physician (Cardiology)      Allergies:  Lisinopril    Medications:  Current Outpatient Medications on File Prior to Visit   Medication Sig Dispense Refill    aspirin 81 MG EC tablet Take 1 tablet by mouth Daily for 90 days. 30 tablet 2    atorvastatin (LIPITOR) 40 MG tablet TAKE 1 TABLET EVERY NIGHT 90 tablet 3    ezetimibe (ZETIA) 10 MG tablet TAKE 1 TABLET DAILY 90 tablet 3    metFORMIN ER (GLUCOPHAGE-XR) 500 MG 24 hr tablet TAKE 1 TABLET BY MOUTH EVERY DAY WITH DINNER 90 tablet 1    metoprolol tartrate (LOPRESSOR) 50 MG tablet Take 1 tablet by mouth Every 12 (Twelve) Hours for 90 days. 60 tablet 2     Current Facility-Administered Medications on File Prior to Visit   Medication Dose Route Frequency Provider Last Rate Last Admin    Chlorhexidine Gluconate Cloth 2 % pads 1 application   1 application  Topical Q12H PRN Rosa M Roy APRN            PFSH:     The following portions of the patient's history were reviewed and updated as appropriate: Allergies / Current Medications / Past Medical History / Surgical History / Social History / Family History    Problem List:  Patient Active Problem List   Diagnosis    History of colon polyps    Primary hypertension    Prediabetes    Hyperlipidemia    Peyronie's disease    Aneurysm of ascending aorta without rupture    Ascending aortic aneurysm    S/P AVR (aortic valve replacement)       Past Medical History:  Past Medical History:   Diagnosis Date    Aortic stenosis 09/29/2023    Aortic stenosis due to bicuspid aortic valve     Arthritis 2019    arthritis is in my hands    Benign essential hypertension     Bilateral carpal tunnel syndrome 06/16/2020    Cataract 2021    Eye doctor saw them in exam    CHF (congestive heart failure)     Colon polyp 2014 or 2015    Diverticulosis of large intestine 03/06/2016     Description: 2015????????colonoscopy????????Dr. Francis????????diverticulosis/colon polyps (2).    Headache 1965    History of transfusion     Hyperlipidemia     Type 2 diabetes mellitus     Visual impairment     glasses for reading       Past Surgical History:  Past Surgical History:   Procedure Laterality Date    ASCENDING AORTIC ANEURYSM REPAIR W/ MECHANICAL AORTIC VALVE REPLACEMENT N/A 10/3/2023    Procedure: INTRAOPERATIVE RAYRAY; STERNOTOMY; CORONARY ARTERY BYPASS GRAFT TIMES ONE USING LEFT INTERNAL MAMMARY ARTERY; AORTIC VALVE REPLACEMENT; NEURO MONITORING AND PRP.;  Surgeon: Benton Mcduffie MD;  Location: [facility];  Service: Cardiothoracic;  Laterality: N/A;    CARDIAC CATHETERIZATION N/A 2023    Procedure: Right Heart Cath;  Surgeon: Kun Desai MD;  Location:  [facility];  Service: Cardiovascular;  Laterality: N/A;    CARDIAC CATHETERIZATION N/A 2023    Procedure: Coronary angiography;  Surgeon: Kun Desai MD;  Location:  [facility];  Service: Cardiovascular;  Laterality: N/A;    CARPAL TUNNEL RELEASE Left 2022    COLONOSCOPY      Dr Francis --WNL.  €“Dr. Hernandez€“colon polyps (2); diverticulosis.    COLONOSCOPY  ?    polyps--DR Francis    HERNIA REPAIR      (R) REPAIR TWICE       Social History:  Social History     Socioeconomic History    Marital status:      Spouse name: Mia    Number of children: 2   Tobacco Use    Smoking status: Never     Passive exposure: Never    Smokeless tobacco: Never    Tobacco comments:     Never Smoked   Vaping Use    Vaping Use: Never used   Substance and Sexual Activity    Alcohol use: No    Drug use: Never    Sexual activity: Yes     Partners: Female       Family History:  Family History   Problem Relation Age of Onset    Hypertension Mother         passed at 87    Heart attack Father 86    Heart disease Father     No Known Problems Sister     Asthma Brother          of covid  "complications    Coronary artery disease Brother     No Known Problems Brother     Diabetes Maternal Grandfather     Dementia Neg Hx     Colon cancer Neg Hx     Prostate cancer Neg Hx     Malig Hyperthermia Neg Hx          PATIENT ASSESSMENT     Vitals:  Vitals:    12/11/23 0834   BP: 158/90   Pulse: 59   Temp: 97.1 °F (36.2 °C)   SpO2: 98%   Weight: 80.3 kg (177 lb)   Height: 177.8 cm (70\")       BP Readings from Last 3 Encounters:   12/11/23 158/90   11/20/23 158/82   11/08/23 153/89     Wt Readings from Last 3 Encounters:   12/11/23 80.3 kg (177 lb)   11/20/23 75.3 kg (166 lb)   11/08/23 75.3 kg (166 lb)      Body mass index is 25.4 kg/m².    Blood pressure readings recorded on patient flowsheet:       No data to display                    Review of Systems:    Review of Systems  Mild weight gain since surgery   No shortness of breath   No palpitations, TIMMONS, angina, edema   GI negative   negative   Neuro negative     Physical Exam:    Physical Exam  General: No acute distress  Psych: Normal thought and judgment   Cardiovascular Rate: normal. Rhythm: regular. Heart sounds: normal   Pulm/Chest: Effort normal, breath sounds normal.   Neck: No palpable mass    No lower extremity edema     Reviewed Data:    Labs:   Lab Results   Component Value Date     10/07/2023    K 4.4 10/07/2023    CALCIUM 8.9 10/07/2023    AST 19 09/29/2023    ALT 16 09/29/2023    BUN 16 10/07/2023    CREATININE 0.93 10/07/2023    CREATININE 0.79 10/06/2023    CREATININE 0.75 (L) 10/05/2023    EGFRIFNONA 72 06/18/2021    EGFRIFAFRI 87 06/18/2021       Lab Results   Component Value Date    HGBA1C 6.10 (H) 09/29/2023    HGBA1C 6.10 (H) 05/09/2023    HGBA1C 6.2 09/13/2022    MICROALBUR 3.1 08/29/2019       Lab Results   Component Value Date    LDL 58 09/29/2023    LDL 79 05/09/2023    LDL 84 11/22/2021    HDL 48 09/29/2023    TRIG 115 09/29/2023    CHOLHDLRATIO 3.31 05/09/2023       Lab Results   Component Value Date    TSH 1.220 06/18/2021 "    FREET4 1.04 06/18/2021          Lab Results   Component Value Date    WBC 6.23 10/07/2023    HGB 10.7 (L) 10/07/2023    HGB 10.8 (L) 10/06/2023    HGB 10.2 (L) 10/05/2023     10/07/2023                   Lab Results   Component Value Date    PSA 1.3 06/01/2022    PSA 1.200 06/16/2020    PSA 1.030 09/20/2017       Imaging:            Medical Tests:      Adult Transthoracic Echo Complete w/ Color, Spectral and Contrast if Necessary Per Protocol (11/20/2023 08:48)     Left ventricular systolic function is normal. Calculated left ventricular EF = 57.5% Normal left ventricular cavity size and wall thickness noted. All left ventricular wall segments contract normally. Left ventricular diastolic function was normal.    There is a bioprosthetic aortic valve present. The aortic valve peak and mean gradients are within defined limits. The prosthetic aortic valve is normal. There is no significant prosthetic valve stenosis or regurgitation Pericardial prosthesis    Mild mitral valve regurgitation is present    Trace tricuspid valve regurgitation is present. Estimated right ventricular systolic pressure from tricuspid regurgitation is normal (<35 mmHg). Calculated right ventricular systolic pressure from tricuspid regurgitation is 18 mmHg.    Summary of old records / correspondence / consultant report:           Request outside records:         Screening for Glaucoma:  Previous screening for glaucoma?:   [] YES  [] NO  []     Hearing Loss Screen:  Finger Rub Hearing Test (right ear):   [] PASSED  [] FAILED  [] BORDERLINE  [] HAS HEARING AID  [] USING HEARING AID  [] NOT USING HEARING AID  []     Finger Rub Hearing Test (left ear):   [] PASSED  [] FAILED  [] BORDERLINE  [] HAS HEARING AID  [] USING HEARING AID  [] NOT USING HEARING AID  []     Urinary Incontinence Screen:  Episodes of urinary incontinence? :  [] YES  [] NO  [] N/A  [] WILL NEED FOLLOWUP  []       Depression Screen:      12/11/2023     8:38 AM    PHQ-2/PHQ-9 Depression Screening   Little Interest or Pleasure in Doing Things 0-->not at all   Feeling Down, Depressed or Hopeless 0-->not at all   PHQ-9: Brief Depression Severity Measure Score 0        PHQ-2:   [] 0 -      NOT DEPRESSED  [] 1 -      NOT DEPRESSED  [] 2 -      NOT DEPRESSED  [] 3-6 -  DEPRESSED (PROCEED TO PHQ-9)  [] N/A - HAS DEPRESSION AND IS ON TREATMENT  [] N/A - HAS DEPRESSION AND IS NOT ON TREATMENT    PHQ-9:   [] 0         NEGATIVE SCREENING FOR DEPRESSION  [] 1-4      MINIMAL DEPRESSION  [] 5-9      MILD DEPRESSIONNOT DEPRESSED  [] 10-14  MODERATE DEPRESSION  [] 15-19  MODERATELY SEVERE DEPRESSION  [] 20-27  SEVERE DEPRESSION    FUNCTIONAL, FALL RISK, & COGNITIVE SCREENING (Components below):    DATA:        12/11/2023     8:39 AM   Functional & Cognitive Status   Do you have difficulty preparing food and eating? No   Do you have difficulty bathing yourself, getting dressed or grooming yourself? No   Do you have difficulty using the toilet? No   Do you have difficulty moving around from place to place? No   Do you have trouble with steps or getting out of a bed or a chair? No   Current Diet Well Balanced Diet   Dental Exam Up to date   Eye Exam Not up to date   Exercise (times per week) 5 times per week   Current Exercises Include No Regular Exercise   Do you need help using the phone?  No   Are you deaf or do you have serious difficulty hearing?  No   Do you need help to go to places out of walking distance? No   Do you need help shopping? No   Do you need help preparing meals?  No   Do you need help with housework?  No   Do you need help with laundry? No   Do you need help taking your medications? No   Do you need help managing money? No   Do you ever drive or ride in a car without wearing a seat belt? No   Do you have difficulty concentrating, remembering or making decisions? No         A) Assessment of Functional Ability:  (Assessment of ability to perform ADL's (showering/bathing,  using toilet, dressing, feeding self, moving self around) and IADL's (use telephone, shop, prepare food, housekeep, do laundry, transport independently, take medications independently, and handle finances)    Degree of functional impairment: (based on assessment noted above)  [] NONE  [] MILD  [] MODERATE  [] SEVERE  [] VERY SEVERE  []     B) Assessment of Fall Risk:  [] LOW  [] MODERATE  [] HIGH  [] VERY HIGH     Need for further evaluation of gait, strength, and balance? :  [] YES  [] NO    Timed Up and Go (TUG):   (>= 12 seconds indicates high risk for falling)    Observable abnormalities included:  [] NORMAL GAIT   [] SLOW CAUTIOUS PACE  [] IMPAIRED BALANCE  [] SHORT STRIDES  [] MINIMAL ARM SWING  [] UNSTEADY (MILD)  [] UNSTEADY (MODERATE)  [] UNSTEADY (SEVERE)  [] SHUFFLING GAIT  [] USES ASSISTIVE DEVICE (CANE) PROPERLY  [] USES ASSISTIVE DEVICE (WALKER) PROPERLY  [] USES ASSISTIVE DEVICE (CANE) PROPERLY  [] DOES NOT USE ASSISTIVE DEVICE PROPERLY  [] IN WHEELCHAIR   [] NOT ABLE TO BE TESTED DUE TO SAFETY CONCERNS  []     C. Assessment of Cognitive Function:    Mini-Cog Test:     1) Registration (3 objects):     [] YES  [] NO   [] N/A HAS DOCUMENTED DEMENTIA     2) Number of objects recalled:   [] 3  [] 2  [] 1  [] 0     3) Clock Draw: Passed? :   [] YES  [] NO   [] N/A  [] N/A HAS DOCUMENTED DEMENTIA           Further evaluation required? :   [] YES  [] NO   [] CLOSE OBSERVATION NEEDED   [] SCHEDULE APPOINTMENT TO EVALUATE FURTHER   [] CONTINUE REGULAR FOLLOWUP AND MANAGEMENT   []     COUNSELING       A. Identification of Health Risk Factors:    Risk factors include: cardiovascular risk factors      B. Age-Appropriate Screening Schedule:  (Refer to the list below for future screening recommendations based on patient's age, sex and/or medical conditions. Orders for these recommended tests are listed in the plan section. The patient has been provided with a written plan)    Health Maintenance Topics  Health  Maintenance   Topic Date Due    PROSTATE CANCER SCREENING  06/01/2023    BMI FOLLOWUP  07/25/2023    ANNUAL WELLNESS VISIT  12/09/2023    COVID-19 Vaccine (4 - 2023-24 season) 12/13/2023 (Originally 9/1/2023)    TDAP/TD VACCINES (1 - Tdap) 12/11/2024 (Originally 10/11/1969)    HEMOGLOBIN A1C  03/29/2024    LIPID PANEL  09/29/2024    COLORECTAL CANCER SCREENING  01/13/2027    HEPATITIS C SCREENING  Completed    INFLUENZA VACCINE  Completed    Pneumococcal Vaccine 65+  Completed    ZOSTER VACCINE  Completed    DIABETIC FOOT EXAM  Discontinued    DIABETIC EYE EXAM  Discontinued    URINE MICROALBUMIN  Discontinued       Health Maintenance Topics Due or Over-Due  Health Maintenance Due   Topic Date Due    PROSTATE CANCER SCREENING  06/01/2023    BMI FOLLOWUP  07/25/2023    ANNUAL WELLNESS VISIT  12/09/2023         C. Advanced Care Planning:    Advance Care Planning   ACP discussion was held with the patient during this visit. Patient has an advance directive (not in EMR), copy requested.       D. Patient Self-Management and Personalized Health Advice:    He has been provided with personalized counseling/information (including brochures/handouts) about:     -- optimizing diet/nutrition plans, weight management, reducing risk for cardiovascular disease (heart, stroke, vascular), reducing risk for cardiac/vascular events with pre-existing disease, designing advance directives, and designating POA      He has been recommended for the following preventative services which has been performed today, will be ordered today or ordered/performed on upcoming follow-up visit:     -- CARDIOVASCULAR disease risk reduction counseling performed, FALL RISK assessment / plan of care completed, URINARY incontinence assessment done, PROSTATE CANCER screening (discussed and PSA ordered +/- ROHINI performed), **COLORECTAL cancer screening (colonoscopy/Cologuard discussed or ordered), vaccination for INFLUENZA administered/recommended/discussed ,  vaccination for Tdap / Td administered/recommended/discussed , vaccination for HEPATITIS A administered/recommended/discussed, COVID-19 vaccination (and/or booster) administered/recommended/discussed, RSV vaccination administered/recommended/discussed, DIABETES screening performed (current/reviewed labs/lab ordered)      E. Miscellaneous Items: [id number]    -Aspirin use counseling: Taking ASA appropriately as indicated    -Discussed BMI with him. The BMI is in the acceptable range    -Reviewed use of high risk medication in the elderly: YES    -Reviewed for potential of harmful drug interactions in the elderly: YES        WRAP UP       Assessment & Plan:    1) MEDICARE ANNUAL WELLNESS VISIT    2) OTHER MEDICAL CONDITIONS ADDRESSED TODAY:            Problem List Items Addressed This Visit          High    Primary hypertension (Chronic)    Current Assessment & Plan     BP Readings from Last 3 Encounters:   12/11/23 158/90   11/20/23 158/82   11/08/23 153/89      Blood pressure is higher here but reportedly lower at home.   Send BP readings in 2 weeks.   Currently taking metoprolol tartrate 50 mg BID.          Relevant Medications    metoprolol tartrate (LOPRESSOR) 50 MG tablet       Medium    Prediabetes (Chronic)    Overview     Continue metformin  mg daily         Relevant Medications    metFORMIN ER (GLUCOPHAGE-XR) 500 MG 24 hr tablet    Other Relevant Orders    Hemoglobin A1c    Hyperlipidemia (Chronic)    Overview     Continue   atorvastatin 40 mg qd   Zetia 10 mg daily         Current Assessment & Plan     Lab Results   Component Value Date    LDL 58 09/29/2023    LDL 79 05/09/2023    LDL 84 11/22/2021    TRIG 115 09/29/2023    CHOLHDLRATIO 3.31 05/09/2023      Continue atorvastatin 40 mg and ezetimibe 10 mg daily          Relevant Medications    ezetimibe (ZETIA) 10 MG tablet    atorvastatin (LIPITOR) 40 MG tablet       Low    S/P AVR (aortic valve replacement) (Chronic)    Overview     - bicuspid AV  with severe stenosis  - 10/2023: s/p AVR / CABG x 1 (Pagni)         Current Assessment & Plan     S/p AVR / CABG x 1 10/2023 (Pagni)    Follow-up with CTS and cardiologist           Other Visit Diagnoses       Medicare annual wellness visit, subsequent    -  Primary    Encounter for screening for malignant neoplasm of prostate        Relevant Orders    PSA Screen                      Orders Placed This Encounter   Procedures    Fluzone High-Dose 65+yrs ([id number])    PSA Screen    Hemoglobin A1c       Discussion / Summary:        Medications as of TODAY:              Current Outpatient Medications   Medication Sig Dispense Refill    aspirin 81 MG EC tablet Take 1 tablet by mouth Daily for 90 days. 30 tablet 2    atorvastatin (LIPITOR) 40 MG tablet TAKE 1 TABLET EVERY NIGHT 90 tablet 3    ezetimibe (ZETIA) 10 MG tablet TAKE 1 TABLET DAILY 90 tablet 3    metFORMIN ER (GLUCOPHAGE-XR) 500 MG 24 hr tablet TAKE 1 TABLET BY MOUTH EVERY DAY WITH DINNER 90 tablet 1    metoprolol tartrate (LOPRESSOR) 50 MG tablet Take 1 tablet by mouth Every 12 (Twelve) Hours for 90 days. 60 tablet 2     No current facility-administered medications for this visit.     Facility-Administered Medications Ordered in Other Visits   Medication Dose Route Frequency Provider Last Rate Last Admin    Chlorhexidine Gluconate Cloth 2 % pads 1 application   1 application  Topical Q12H PRN Rosa M Roy, APRN             FOLLOW-UP:            Return in about 6 months (around 6/11/2024) for Reassess chronic medical problems, **SCHEDULE COMB AWV/MED FU FOR NEXT YR (30 MIN)**.              Future Appointments   Date Time Provider Department Center   4/22/2024 10:30 AM Jaylan Taylor PA-C [facility]   6/11/2024 10:15 AM Jhon Alvarez MD [facility]           After Visit Summary (AVS) including the Personalized Prevention  Plan Services (PPPS) was either printed and given to the patient at check-out today and/or sent to [facility] for  review.       Transcribed from ambient dictation for Jhon Alvarez MD by Nicki Spann.  12/11/23   09:50 EST    Patient or patient representative verbalized consent to the visit recording.  I have personally performed the services described in this document as transcribed by the above individual, and it is both accurate and complete.  Jhon Alvarez MD  12/11/2023  10:00 EST

## 2023-12-12 NOTE — PROGRESS NOTES
CALL PATIENT with results:    PSA level (for prostate cancer screening) is notably higher than last time.   - schedule lab for total/free PSA in 3 months (IMPORTANT)    A1c level for blood sugar average is stable.

## 2024-02-07 DIAGNOSIS — R73.03 PREDIABETES: ICD-10-CM

## 2024-02-07 RX ORDER — METFORMIN HYDROCHLORIDE 500 MG/1
TABLET, EXTENDED RELEASE ORAL
Qty: 90 TABLET | Refills: 1 | Status: SHIPPED | OUTPATIENT
Start: 2024-02-07

## 2024-02-08 RX ORDER — METOPROLOL TARTRATE 50 MG/1
50 TABLET, FILM COATED ORAL EVERY 12 HOURS
Qty: 180 TABLET | OUTPATIENT
Start: 2024-02-08

## 2024-03-11 ENCOUNTER — TELEPHONE (OUTPATIENT)
Dept: INTERNAL MEDICINE | Age: 74
End: 2024-03-11
Payer: MEDICARE

## 2024-03-11 DIAGNOSIS — R97.20 ELEVATED PSA: Primary | ICD-10-CM

## 2024-04-22 ENCOUNTER — OFFICE VISIT (OUTPATIENT)
Dept: CARDIOLOGY | Facility: CLINIC | Age: 74
End: 2024-04-22
Payer: MEDICARE

## 2024-04-22 VITALS
HEIGHT: 70 IN | HEART RATE: 67 BPM | WEIGHT: 173 LBS | BODY MASS INDEX: 24.77 KG/M2 | OXYGEN SATURATION: 98 % | DIASTOLIC BLOOD PRESSURE: 88 MMHG | SYSTOLIC BLOOD PRESSURE: 140 MMHG

## 2024-04-22 DIAGNOSIS — Z95.2 S/P AVR (AORTIC VALVE REPLACEMENT): ICD-10-CM

## 2024-04-22 DIAGNOSIS — I71.21 ANEURYSM OF ASCENDING AORTA WITHOUT RUPTURE: Primary | ICD-10-CM

## 2024-04-22 DIAGNOSIS — E78.2 MIXED HYPERLIPIDEMIA: ICD-10-CM

## 2024-04-22 PROCEDURE — 93000 ELECTROCARDIOGRAM COMPLETE: CPT | Performed by: NURSE PRACTITIONER

## 2024-04-22 PROCEDURE — 3079F DIAST BP 80-89 MM HG: CPT | Performed by: NURSE PRACTITIONER

## 2024-04-22 PROCEDURE — 1159F MED LIST DOCD IN RCRD: CPT | Performed by: NURSE PRACTITIONER

## 2024-04-22 PROCEDURE — 1160F RVW MEDS BY RX/DR IN RCRD: CPT | Performed by: NURSE PRACTITIONER

## 2024-04-22 PROCEDURE — 3077F SYST BP >= 140 MM HG: CPT | Performed by: NURSE PRACTITIONER

## 2024-04-22 PROCEDURE — 99214 OFFICE O/P EST MOD 30 MIN: CPT | Performed by: NURSE PRACTITIONER

## 2024-04-22 RX ORDER — METOPROLOL TARTRATE 50 MG/1
50 TABLET, FILM COATED ORAL EVERY 12 HOURS SCHEDULED
Qty: 180 TABLET | Refills: 0 | Status: SHIPPED | OUTPATIENT
Start: 2024-04-22 | End: 2024-07-21

## 2024-04-22 RX ORDER — ASPIRIN 81 MG/1
81 TABLET, CHEWABLE ORAL DAILY
COMMUNITY

## 2024-04-22 NOTE — PROGRESS NOTES
[facility] Follow Up Office Note     Encounter Date:24  Patient:Jeremi Shankar Sr.  :1950  MRN:[id number]      Chief Complaint: No chief complaint on file.        History of Presenting Illness:        Jeremi Shankar Sr. is a 73 y.o. male who is here for follow-up.  He is a patient of Dr Grey.    Patient has past medical history that is significant for bicuspid aortic valve with severe aortic stenosis as well as ascending thoracic aneurysm measuring 4.2 to 4.7 cm coronary artery disease.  He is status post bioprosthetic AVR as well as CABG x 1 with Dr. Mcduffie 10/3/2023. Patient is here today for follow-up.    Patient has a 15 acre property with dogs and chickens.  He is active at baseline and increasing his activity level on his own.  He denies increased shortness of breath, lower extremity edema, PND orthopnea.  He is not having chest pain.  Patient notes 1 episode of right lower field vision loss for about 10 minutes about 2 weeks ago.  This was an isolated episode.  He does have migraines.    Review of Systems:  Review of Systems   Cardiovascular:  Negative for chest pain, dyspnea on exertion, leg swelling, orthopnea and palpitations.   Respiratory:  Negative for shortness of breath.        Current Outpatient Medications on File Prior to Visit   Medication Sig Dispense Refill    atorvastatin (LIPITOR) 40 MG tablet TAKE 1 TABLET EVERY NIGHT 90 tablet 3    ezetimibe (ZETIA) 10 MG tablet TAKE 1 TABLET DAILY 90 tablet 3    metFORMIN ER (GLUCOPHAGE-XR) 500 MG 24 hr tablet TAKE 1 TABLET BY MOUTH WITH  DINNER 90 tablet 1    aspirin 81 MG chewable tablet Chew 1 tablet Daily.      [DISCONTINUED] metoprolol tartrate (LOPRESSOR) 50 MG tablet Take 1 tablet by mouth Every 12 (Twelve) Hours for 90 days. 60 tablet 2     Current Facility-Administered Medications on File Prior to Visit   Medication Dose Route Frequency Provider Last Rate Last Admin    Chlorhexidine Gluconate Cloth 2 % pads 1  application   1 application  Topical Q12H PRN Rosa M Roy, APRN           Allergies   Allergen Reactions    Lisinopril Cough       Past Medical History:   Diagnosis Date    Aortic stenosis 09/29/2023    Aortic stenosis due to bicuspid aortic valve     Arthritis 2019    arthritis is in my hands    Benign essential hypertension     Bilateral carpal tunnel syndrome 06/16/2020    Cataract 2021    Eye doctor saw them in exam    CHF (congestive heart failure)     Colon polyp 2014 or 2015    Diverticulosis of large intestine 03/06/2016    Description: March 2015????????colonoscopy????????Dr. Francis????????diverticulosis/colon polyps (2).    Headache 1965    History of transfusion     Hyperlipidemia     Type 2 diabetes mellitus     Visual impairment     glasses for reading       Past Surgical History:   Procedure Laterality Date    ASCENDING AORTIC ANEURYSM REPAIR W/ MECHANICAL AORTIC VALVE REPLACEMENT N/A 10/3/2023    Procedure: INTRAOPERATIVE RAYRAY; STERNOTOMY; CORONARY ARTERY BYPASS GRAFT TIMES ONE USING LEFT INTERNAL MAMMARY ARTERY; AORTIC VALVE REPLACEMENT; NEURO MONITORING AND PRP.;  Surgeon: Benton Mcduffie MD;  Location: [facility];  Service: Cardiothoracic;  Laterality: N/A;    CARDIAC CATHETERIZATION N/A 8/8/2023    Procedure: Right Heart Cath;  Surgeon: Kun Desai MD;  Location:  [facility];  Service: Cardiovascular;  Laterality: N/A;    CARDIAC CATHETERIZATION N/A 8/8/2023    Procedure: Coronary angiography;  Surgeon: Kun Desai MD;  Location:  [facility];  Service: Cardiovascular;  Laterality: N/A;    CARPAL TUNNEL RELEASE Left 05/2022    COLONOSCOPY  2007    Dr Francis --WNL.  March 2015â€“Dr. Hernandez€“colon polyps (2); diverticulosis.    COLONOSCOPY  2014?    polyps--DR Francis    HERNIA REPAIR      (R) REPAIR TWICE       Social History     Socioeconomic History    Marital status:      Spouse name: Mia    Number of children: 2   Tobacco Use     "Smoking status: Never     Passive exposure: Never    Smokeless tobacco: Never    Tobacco comments:     Never Smoked   Vaping Use    Vaping status: Never Used   Substance and Sexual Activity    Alcohol use: No    Drug use: Never    Sexual activity: Yes     Partners: Female       Family History   Problem Relation Age of Onset    Hypertension Mother         passed at 87    Heart attack Father 86    Heart disease Father     No Known Problems Sister     Asthma Brother          of covid complications    Coronary artery disease Brother     No Known Problems Brother     Diabetes Maternal Grandfather     Dementia Neg Hx     Colon cancer Neg Hx     Prostate cancer Neg Hx     Malig Hyperthermia Neg Hx        The following portions of the patient's history were reviewed and updated as appropriate: allergies, current medications, past family history, past medical history, past social history, past surgical history and problem list.       Objective:       Vitals:    24 0952   BP: 140/88   Pulse: 67   SpO2: 98%   Weight: 78.5 kg (173 lb)   Height: 177.8 cm (70\")         Physical Exam:  Constitutional: Well appearing, well developed, no acute distress   HENT: Oropharynx clear and membrane moist  Eyes: Normal conjunctiva, no sclera icterus  Neck: Supple, no carotid bruit bilaterally  Cardiovascular: Regular rate and rhythm, No Murmur, No bilateral lower extremity edema  Pulmonary: Normal respiratory effort, normal lung sounds, no wheezing  Neurological: Alert and orient x 3  Skin: Warm, dry, no ecchymosis, no rash  Psych: Appropriate mood and affect. Normal judgment and insight         Lab Results   Component Value Date     10/07/2023     10/06/2023    K 4.4 10/07/2023    K 4.2 10/06/2023    CL 99 10/07/2023     10/06/2023    CO2 30.0 (H) 10/07/2023    CO2 25.2 10/06/2023    BUN 16 10/07/2023    BUN 17 10/06/2023    CREATININE 0.93 10/07/2023    CREATININE 0.79 10/06/2023    EGFRIFNONA 72 2021    " "EGFRIFNONA 68 12/18/2020    EGFRIFAFRI 87 06/18/2021    EGFRIFAFRI 83 12/18/2020    GLUCOSE 124 (H) 10/07/2023    GLUCOSE 127 (H) 10/06/2023    CALCIUM 8.9 10/07/2023    CALCIUM 8.9 10/06/2023    PROTENTOTREF 7.0 05/09/2023    PROTENTOTREF 7.0 06/01/2022    ALBUMIN 4.4 10/04/2023    ALBUMIN 4.3 10/03/2023    BILITOT 0.4 09/29/2023    BILITOT 0.4 05/09/2023    AST 19 09/29/2023    AST 22 05/09/2023    ALT 16 09/29/2023    ALT 22 05/09/2023     Lab Results   Component Value Date    WBC 6.23 10/07/2023    WBC 6.97 10/06/2023    HGB 10.7 (L) 10/07/2023    HGB 10.8 (L) 10/06/2023    HCT 32.7 (L) 10/07/2023    HCT 33.0 (L) 10/06/2023    MCV 83.0 10/07/2023    MCV 83.8 10/06/2023     10/07/2023     (L) 10/06/2023     Lab Results   Component Value Date    CHOL 127 09/29/2023    TRIG 115 09/29/2023    TRIG 142 05/09/2023    HDL 48 09/29/2023    HDL 45 05/09/2023    LDL 58 09/29/2023    LDL 79 05/09/2023     Lab Results   Component Value Date    PROBNP <36.0 09/29/2023     No results found for: \"CKTOTAL\", \"CKMB\", \"CKMBINDEX\", \"TROPONINI\", \"TROPONINT\"  Lab Results   Component Value Date    TSH 1.220 06/18/2021    TSH 1.600 06/16/2020           ECG 12 Lead    Date/Time: 4/22/2024 10:32 AM  Performed by: Candace Woodward APRN    Authorized by: Candace Woodward APRN  Comparison: compared with previous ECG from 10/20/2023  Similar to previous ECG  Rhythm: sinus rhythm  Rate: normal  BPM: 68             Assessment:          Diagnosis Plan   1. Aneurysm of ascending aorta without rupture        2. Mixed hyperlipidemia        3. S/P AVR (aortic valve replacement)  ECG 12 Lead             Plan:       History of aortic valve replacement due to severe aortic stenosis with bicuspid valve - appropriate valve function on echocardiogram 11/2023.  Clinically appears compensated.  Continue 81 mg aspirin and beta-blocker.  Patient is aware of dental prophylaxis  We discussed screening echocardiograms for his adult children " given possible hereditary bicuspid valve  Coronary artery disease - hx of CABG x1 LIMA to LAD. Denies anginal symptoms. Continue aspirin, beta blocker and high potency statin  Ascending thoracic aortic aneurysm -4.2 cm on IntraOp RAYRAY.  He is to follow with Dr. Mcduffie for surveillance monitoring  Hypertension - isolated high reading today, admits stress from parking lot. He will monitor at home and call for SBP > 135 mmHg consistently  Hyperlipidemia - statin  Vision change - recommended PCP and Ophthalmology follow-up. He also has migraines. ER for recurrent episode        Patient is seen today for follow-up.  I think he is stable from a cardiac perspective and I am not recommending changes.  Follow-up with Dr. Grey in 6 months, earlier with problems    Orders Placed This Encounter   Procedures    ECG 12 Lead     This order was created via procedure documentation     Order Specific Question:   Release to patient     Answer:   Routine Release [[id number]]            CLEO Jacques  [facility]  04/22/24  10:33 EDT

## 2024-06-11 ENCOUNTER — OFFICE VISIT (OUTPATIENT)
Dept: INTERNAL MEDICINE | Age: 74
End: 2024-06-11
Payer: MEDICARE

## 2024-06-11 VITALS
HEART RATE: 77 BPM | BODY MASS INDEX: 25.48 KG/M2 | HEIGHT: 70 IN | SYSTOLIC BLOOD PRESSURE: 150 MMHG | DIASTOLIC BLOOD PRESSURE: 96 MMHG | TEMPERATURE: 97.1 F | OXYGEN SATURATION: 97 % | WEIGHT: 178 LBS

## 2024-06-11 DIAGNOSIS — E78.2 MIXED HYPERLIPIDEMIA: Chronic | ICD-10-CM

## 2024-06-11 DIAGNOSIS — Z95.2 S/P AVR (AORTIC VALVE REPLACEMENT): Chronic | ICD-10-CM

## 2024-06-11 DIAGNOSIS — R73.03 PREDIABETES: Chronic | ICD-10-CM

## 2024-06-11 DIAGNOSIS — I10 PRIMARY HYPERTENSION: Primary | Chronic | ICD-10-CM

## 2024-06-11 LAB
ALBUMIN SERPL-MCNC: 4.6 G/DL (ref 3.5–5.2)
ALBUMIN/GLOB SERPL: 1.9 G/DL
ALP SERPL-CCNC: 100 U/L (ref 39–117)
ALT SERPL-CCNC: 17 U/L (ref 1–41)
AST SERPL-CCNC: 22 U/L (ref 1–40)
BILIRUB SERPL-MCNC: 0.6 MG/DL (ref 0–1.2)
BUN SERPL-MCNC: 17 MG/DL (ref 8–23)
BUN/CREAT SERPL: 16.2 (ref 7–25)
CALCIUM SERPL-MCNC: 9.7 MG/DL (ref 8.6–10.5)
CHLORIDE SERPL-SCNC: 101 MMOL/L (ref 98–107)
CHOLEST SERPL-MCNC: 135 MG/DL (ref 0–200)
CHOLEST/HDLC SERPL: 2.81 {RATIO}
CO2 SERPL-SCNC: 26.7 MMOL/L (ref 22–29)
CREAT SERPL-MCNC: 1.05 MG/DL (ref 0.76–1.27)
EGFRCR SERPLBLD CKD-EPI 2021: 74.5 ML/MIN/1.73
GLOBULIN SER CALC-MCNC: 2.4 GM/DL
GLUCOSE SERPL-MCNC: 102 MG/DL (ref 65–99)
HBA1C MFR BLD: 6.2 % (ref 4.8–5.6)
HDLC SERPL-MCNC: 48 MG/DL (ref 40–60)
LDLC SERPL CALC-MCNC: 67 MG/DL (ref 0–100)
POTASSIUM SERPL-SCNC: 4.7 MMOL/L (ref 3.5–5.2)
PROT SERPL-MCNC: 7 G/DL (ref 6–8.5)
SODIUM SERPL-SCNC: 138 MMOL/L (ref 136–145)
TRIGL SERPL-MCNC: 109 MG/DL (ref 0–150)
VLDLC SERPL CALC-MCNC: 20 MG/DL (ref 5–40)

## 2024-06-11 PROCEDURE — 1159F MED LIST DOCD IN RCRD: CPT | Performed by: INTERNAL MEDICINE

## 2024-06-11 PROCEDURE — 3080F DIAST BP >= 90 MM HG: CPT | Performed by: INTERNAL MEDICINE

## 2024-06-11 PROCEDURE — 1126F AMNT PAIN NOTED NONE PRSNT: CPT | Performed by: INTERNAL MEDICINE

## 2024-06-11 PROCEDURE — 1160F RVW MEDS BY RX/DR IN RCRD: CPT | Performed by: INTERNAL MEDICINE

## 2024-06-11 PROCEDURE — 99214 OFFICE O/P EST MOD 30 MIN: CPT | Performed by: INTERNAL MEDICINE

## 2024-06-11 PROCEDURE — 3077F SYST BP >= 140 MM HG: CPT | Performed by: INTERNAL MEDICINE

## 2024-06-11 PROCEDURE — G2211 COMPLEX E/M VISIT ADD ON: HCPCS | Performed by: INTERNAL MEDICINE

## 2024-06-11 RX ORDER — LOSARTAN POTASSIUM 25 MG/1
25 TABLET ORAL DAILY
Qty: 90 TABLET | Refills: 1 | Status: SHIPPED | OUTPATIENT
Start: 2024-06-11

## 2024-06-11 NOTE — ASSESSMENT & PLAN NOTE
Lab Results   Component Value Date    HGBA1C 6.10 (H) 12/11/2023    HGBA1C 6.10 (H) 09/29/2023    HGBA1C 6.10 (H) 05/09/2023       Check A1c level and continue metformin  mg daily

## 2024-06-11 NOTE — PROGRESS NOTES
I N T E R N A L  M E D I C I N E    J U N O H  K I M,  M D      ENCOUNTER DATE:  06/11/2024    Jeremi Shankar Sr. / 73 y.o. / male    CHIEF COMPLAINT / REASON FOR OFFICE VISIT     Hypertension, Prediabetes, and Hyperlipidemia      ASSESSMENT & PLAN     Problem List Items Addressed This Visit          High    Primary hypertension - Primary (Chronic)    Current Assessment & Plan     BP Readings from Last 3 Encounters:   06/11/24 150/96   04/22/24 140/88   12/11/23 158/90       Blood pressure remains higher than target blood pressure goal.  Start losartan 25 mg daily and continue metoprolol titrate 50 mg twice daily.  Monitor blood pressure at home.  Maintain low-sodium diet of less than 2.5 g/day.         Relevant Medications    metoprolol tartrate (LOPRESSOR) 50 MG tablet    losartan (COZAAR) 25 MG tablet    Other Relevant Orders    Comprehensive Metabolic Panel       Medium    Prediabetes (Chronic)    Overview     Continue metformin  mg daily         Current Assessment & Plan     Lab Results   Component Value Date    HGBA1C 6.10 (H) 12/11/2023    HGBA1C 6.10 (H) 09/29/2023    HGBA1C 6.10 (H) 05/09/2023       Check A1c level and continue metformin  mg daily         Relevant Medications    metFORMIN ER (GLUCOPHAGE-XR) 500 MG 24 hr tablet    Other Relevant Orders    Hemoglobin A1c    Hyperlipidemia (Chronic)    Overview     Continue   atorvastatin 40 mg qd   Zetia 10 mg daily         Relevant Medications    ezetimibe (ZETIA) 10 MG tablet    atorvastatin (LIPITOR) 40 MG tablet    Other Relevant Orders    Comprehensive Metabolic Panel    Lipid Panel With / Chol / HDL Ratio       Low    S/P AVR (aortic valve replacement) (Chronic)    Overview     - bicuspid AV with severe stenosis  - 10/2023: s/p AVR / CABG x 1 ([facility])          Orders Placed This Encounter   Procedures    Comprehensive Metabolic Panel    Lipid Panel With / Chol / HDL Ratio    Hemoglobin A1c     New Medications Ordered This Visit  "  Medications    losartan (COZAAR) 25 MG tablet     Sig: Take 1 tablet by mouth Daily.     Dispense:  90 tablet     Refill:  1       SUMMARY/DISCUSSION      Next Appointment with me: Visit date not found    Return in about 3 months (around 9/11/2024) for Hypertension, Schedule AWV FOR DECEMBER.      VITAL SIGNS     Vitals:    06/11/24 1029   BP: 150/96   Pulse: 77   Temp: 97.1 °F (36.2 °C)   SpO2: 97%   Weight: 80.7 kg (178 lb)   Height: 177.8 cm (70\")       BP Readings from Last 3 Encounters:   06/11/24 150/96   04/22/24 140/88   12/11/23 158/90     Wt Readings from Last 3 Encounters:   06/11/24 80.7 kg (178 lb)   04/22/24 78.5 kg (173 lb)   12/11/23 80.3 kg (177 lb)     Body mass index is 25.54 kg/m².    Blood pressure readings recorded on patient flowsheet:       No data to display                  MEDICATIONS AT THE TIME OF OFFICE VISIT     Current Outpatient Medications on File Prior to Visit   Medication Sig    aspirin 81 MG chewable tablet Chew 1 tablet Daily.    atorvastatin (LIPITOR) 40 MG tablet TAKE 1 TABLET EVERY NIGHT    ezetimibe (ZETIA) 10 MG tablet TAKE 1 TABLET DAILY    metFORMIN ER (GLUCOPHAGE-XR) 500 MG 24 hr tablet TAKE 1 TABLET BY MOUTH WITH  DINNER    metoprolol tartrate (LOPRESSOR) 50 MG tablet Take 1 tablet by mouth Every 12 (Twelve) Hours for 90 days.     Current Facility-Administered Medications on File Prior to Visit   Medication    Chlorhexidine Gluconate Cloth 2 % pads 1 application           HISTORY OF PRESENT ILLNESS     Patient denies any significant problems or issues regarding his heart.  He is status post AVR with one-vessel CABG in October 2023.  Denies chest pains, dyspnea exertion or palpitations.  He is on aspirin 81 mg daily along with atorvastatin 40 mg and Zetia 10 mg for hyperlipidemia.  He is on metformin  mg daily for prediabetes with most recent A1c of 6.1.  His blood pressure remains high on metoprolol titrate 50 mg twice daily.    Patient Care Team:  Jhon Alvarez, " "MD as PCP - General (Internal Medicine)  Nash Grey MD as Consulting Physician (Cardiology)    REVIEW OF SYSTEMS     Review of Systems       PHYSICAL EXAMINATION     Physical Exam  General: No acute distress  Psych: Normal thought and judgment   Cardiovascular Rate: normal. Rhythm: regular. Heart sounds: normal  Pulmonary/Chest: Effort normal and breath sounds normal.   No significant edema of the lower extremities      REVIEWED DATA     Labs:     Lab Results   Component Value Date     10/07/2023    K 4.4 10/07/2023    CALCIUM 8.9 10/07/2023    AST 19 09/29/2023    ALT 16 09/29/2023    BUN 16 10/07/2023    CREATININE 0.93 10/07/2023    CREATININE 0.79 10/06/2023    CREATININE 0.75 (L) 10/05/2023    EGFRRESULT 84.5 05/09/2023       Lab Results   Component Value Date    HGBA1C 6.10 (H) 12/11/2023    HGBA1C 6.10 (H) 09/29/2023    HGBA1C 6.10 (H) 05/09/2023       Lab Results   Component Value Date    LDL 58 09/29/2023    LDL 79 05/09/2023    LDL 84 11/22/2021    HDL 48 09/29/2023    HDL 45 05/09/2023    TRIG 115 09/29/2023    TRIG 142 05/09/2023       Lab Results   Component Value Date    TSH 1.220 06/18/2021    TSH 1.600 06/16/2020    FREET4 1.04 06/18/2021    FREET4 1.10 06/16/2020       Lab Results   Component Value Date    WBC 6.23 10/07/2023    HGB 10.7 (L) 10/07/2023     10/07/2023       No results found for: \"MALBCRERATIO\"          Imaging:               Medical Tests:               Summary of old records / correspondence / consultant report:             Request outside records:       "

## 2024-06-11 NOTE — ASSESSMENT & PLAN NOTE
BP Readings from Last 3 Encounters:   06/11/24 150/96   04/22/24 140/88   12/11/23 158/90       Blood pressure remains higher than target blood pressure goal.  Start losartan 25 mg daily and continue metoprolol titrate 50 mg twice daily.  Monitor blood pressure at home.  Maintain low-sodium diet of less than 2.5 g/day.

## 2024-06-28 RX ORDER — METOPROLOL TARTRATE 50 MG/1
50 TABLET, FILM COATED ORAL EVERY 12 HOURS
Qty: 180 TABLET | Refills: 3 | Status: SHIPPED | OUTPATIENT
Start: 2024-06-28

## 2024-07-25 DIAGNOSIS — E78.2 MIXED HYPERLIPIDEMIA: ICD-10-CM

## 2024-07-26 RX ORDER — EZETIMIBE 10 MG/1
10 TABLET ORAL DAILY
Qty: 90 TABLET | Refills: 0 | Status: SHIPPED | OUTPATIENT
Start: 2024-07-26

## 2024-07-26 RX ORDER — ATORVASTATIN CALCIUM 40 MG/1
40 TABLET, FILM COATED ORAL
Qty: 90 TABLET | Refills: 0 | Status: SHIPPED | OUTPATIENT
Start: 2024-07-26

## 2024-09-18 ENCOUNTER — OFFICE VISIT (OUTPATIENT)
Dept: INTERNAL MEDICINE | Age: 74
End: 2024-09-18
Payer: MEDICARE

## 2024-09-18 VITALS
BODY MASS INDEX: 25.34 KG/M2 | TEMPERATURE: 97.1 F | SYSTOLIC BLOOD PRESSURE: 146 MMHG | OXYGEN SATURATION: 96 % | WEIGHT: 177 LBS | HEIGHT: 70 IN | DIASTOLIC BLOOD PRESSURE: 78 MMHG | HEART RATE: 65 BPM

## 2024-09-18 DIAGNOSIS — R73.03 PREDIABETES: Chronic | ICD-10-CM

## 2024-09-18 DIAGNOSIS — E78.2 MIXED HYPERLIPIDEMIA: Chronic | ICD-10-CM

## 2024-09-18 DIAGNOSIS — I10 PRIMARY HYPERTENSION: Primary | Chronic | ICD-10-CM

## 2024-09-18 LAB
BUN SERPL-MCNC: 19 MG/DL (ref 8–23)
BUN/CREAT SERPL: 18.1 (ref 7–25)
CALCIUM SERPL-MCNC: 9.9 MG/DL (ref 8.6–10.5)
CHLORIDE SERPL-SCNC: 101 MMOL/L (ref 98–107)
CO2 SERPL-SCNC: 27.6 MMOL/L (ref 22–29)
CREAT SERPL-MCNC: 1.05 MG/DL (ref 0.76–1.27)
EGFRCR SERPLBLD CKD-EPI 2021: 74.5 ML/MIN/1.73
GLUCOSE SERPL-MCNC: 98 MG/DL (ref 65–99)
HBA1C MFR BLD: 6.2 % (ref 4.8–5.6)
POTASSIUM SERPL-SCNC: 4.9 MMOL/L (ref 3.5–5.2)
SODIUM SERPL-SCNC: 139 MMOL/L (ref 136–145)

## 2024-09-18 PROCEDURE — 3077F SYST BP >= 140 MM HG: CPT | Performed by: INTERNAL MEDICINE

## 2024-09-18 PROCEDURE — 99214 OFFICE O/P EST MOD 30 MIN: CPT | Performed by: INTERNAL MEDICINE

## 2024-09-18 PROCEDURE — G2211 COMPLEX E/M VISIT ADD ON: HCPCS | Performed by: INTERNAL MEDICINE

## 2024-09-18 PROCEDURE — 1126F AMNT PAIN NOTED NONE PRSNT: CPT | Performed by: INTERNAL MEDICINE

## 2024-09-18 PROCEDURE — 3078F DIAST BP <80 MM HG: CPT | Performed by: INTERNAL MEDICINE

## 2024-09-18 PROCEDURE — 3044F HG A1C LEVEL LT 7.0%: CPT | Performed by: INTERNAL MEDICINE

## 2024-09-18 RX ORDER — LOSARTAN POTASSIUM 50 MG/1
50 TABLET ORAL DAILY
Qty: 90 TABLET | Refills: 1 | Status: SHIPPED | OUTPATIENT
Start: 2024-09-18

## 2024-09-26 DIAGNOSIS — E78.2 MIXED HYPERLIPIDEMIA: ICD-10-CM

## 2024-09-27 RX ORDER — ATORVASTATIN CALCIUM 40 MG/1
40 TABLET, FILM COATED ORAL
Qty: 90 TABLET | Refills: 1 | Status: SHIPPED | OUTPATIENT
Start: 2024-09-27

## 2024-09-27 RX ORDER — EZETIMIBE 10 MG/1
10 TABLET ORAL DAILY
Qty: 90 TABLET | Refills: 1 | Status: SHIPPED | OUTPATIENT
Start: 2024-09-27

## 2024-10-02 DIAGNOSIS — I71.21 ANEURYSM OF ASCENDING AORTA WITHOUT RUPTURE: Primary | ICD-10-CM

## 2024-10-11 ENCOUNTER — TELEPHONE (OUTPATIENT)
Dept: CARDIOLOGY | Facility: CLINIC | Age: 74
End: 2024-10-11
Payer: MEDICARE

## 2024-10-11 NOTE — TELEPHONE ENCOUNTER
10/11/24      We received a request on behalf of the patient from the [facility] stating that the patient needs cardiac clearance due to having cataract surgery.

## 2024-11-01 ENCOUNTER — OFFICE VISIT (OUTPATIENT)
Dept: CARDIOLOGY | Facility: CLINIC | Age: 74
End: 2024-11-01
Payer: MEDICARE

## 2024-11-01 VITALS
DIASTOLIC BLOOD PRESSURE: 78 MMHG | OXYGEN SATURATION: 97 % | HEIGHT: 70 IN | WEIGHT: 180.6 LBS | BODY MASS INDEX: 25.86 KG/M2 | SYSTOLIC BLOOD PRESSURE: 138 MMHG

## 2024-11-01 DIAGNOSIS — I71.21 ANEURYSM OF ASCENDING AORTA WITHOUT RUPTURE: ICD-10-CM

## 2024-11-01 DIAGNOSIS — E78.2 MIXED HYPERLIPIDEMIA: Chronic | ICD-10-CM

## 2024-11-01 DIAGNOSIS — Z95.2 S/P AVR (AORTIC VALVE REPLACEMENT): Primary | Chronic | ICD-10-CM

## 2024-11-01 RX ORDER — ASPIRIN 81 MG/1
81 TABLET, CHEWABLE ORAL DAILY
Start: 2024-11-01 | End: 2025-11-01

## 2024-11-01 NOTE — PROGRESS NOTES
[facility]    Subjective:     Encounter Date:11/01/24      Patient ID: Jeremi Shankar Sr. is a 74 y.o. male.    Chief Complaint:   Chief Complaint   Patient presents with    S/P AVR (aortic valve replacement)     Patient is in the office today for his 6 month follow up appointment.    Follow-up after AVR  History of Present Illness    Mr. Shankar presents for further evaluation.  He was noted to have a bicuspid aortic valve despite being physically active, and underwent a echocardiogram which revealed borderline very severe aortic stenosis in 2023.  He had an ascending thoracic aorta of 4.7 to 4.2 cm, and with these findings he subsequently underwent AVR.  He tolerated the procedure very well.       He has no complaints today.  He has no exertional complaints he is physically active.  He is doing well.  He inadvertently was not taking aspirin he was not sure he had to take it or not.    - severe bicuspid aortic valve stenosis 10/3/2023- s/p AVR (tissue), CABGx1 LIMA--Pagni  - ascending aortic aneurysm--4.2 cm per CTA.  Did not require intervention will be monitored as an outpatient.  - severe single vessel CAD--70% LAD  - hypertension  - hyperlipidemia--statin therapy  - DM II--A1c 6.1  -post op anemia-expected acute blood loss     Postop echo November 2023:    Left ventricular systolic function is normal. Calculated left ventricular EF = 57.5% Normal left ventricular cavity size and wall thickness noted. All left ventricular wall segments contract normally. Left ventricular diastolic function was normal.    There is a bioprosthetic aortic valve present. The aortic valve peak and mean gradients are within defined limits. The prosthetic aortic valve is normal. There is no significant prosthetic valve stenosis or regurgitation Pericardial prosthesis    Mild mitral valve regurgitation is present    Trace tricuspid valve regurgitation is present. Estimated right ventricular systolic  pressure from tricuspid regurgitation is normal (<35 mmHg). Calculated right ventricular systolic pressure from tricuspid regurgitation is 18 mmHg.    The following portions of the patient's history were reviewed and updated as appropriate: allergies, current medications, past family history, past medical history, past social history, past surgical history and problem list.    Past Medical History:   Diagnosis Date    Aortic stenosis 09/29/2023    Aortic stenosis due to bicuspid aortic valve     Arthritis 2019    arthritis is in my hands    Benign essential hypertension     Bilateral carpal tunnel syndrome 06/16/2020    Cataract 2021    Eye doctor saw them in exam    CHF (congestive heart failure)     Colon polyp 2014 or 2015    Diverticulosis of large intestine 03/06/2016    Description: March 2015????????colonoscopy????????Dr. Francis????????diverticulosis/colon polyps (2).    Headache 1965    History of transfusion     Hyperlipidemia     Type 2 diabetes mellitus     Visual impairment     glasses for reading       Past Surgical History:   Procedure Laterality Date    ASCENDING AORTIC ANEURYSM REPAIR W/ MECHANICAL AORTIC VALVE REPLACEMENT N/A 10/3/2023    Procedure: INTRAOPERATIVE RAYRAY; STERNOTOMY; CORONARY ARTERY BYPASS GRAFT TIMES ONE USING LEFT INTERNAL MAMMARY ARTERY; AORTIC VALVE REPLACEMENT; NEURO MONITORING AND PRP.;  Surgeon: Benton Mcduffie MD;  Location: [facility];  Service: Cardiothoracic;  Laterality: N/A;    CARDIAC CATHETERIZATION N/A 8/8/2023    Procedure: Right Heart Cath;  Surgeon: Kun Desai MD;  [facility];  Service: Cardiovascular;  Laterality: N/A;    CARDIAC CATHETERIZATION N/A 8/8/2023    Procedure: Coronary angiography;  Surgeon: Kun Desai MD;  Location: [facility] CATH INVASIVE LOC[facility];  Service: Cardiovascular;  Laterality: N/A;    CARPAL TUNNEL RELEASE Left 05/2022    COLONOSCOPY  2007    Dr Francis --WNL.  March 2015â€“Dr. Hernandez€“colon polyps  "(2); diverticulosis.    COLONOSCOPY  2014?    polyps--DR Francis    HERNIA REPAIR      (R) REPAIR TWICE         Procedures       Objective:     Vitals:    11/01/24 1045   BP: 138/78   BP Location: Left arm   Patient Position: Sitting   Cuff Size: Adult   SpO2: 97%   Weight: 81.9 kg (180 lb 9.6 oz)   Height: 177.8 cm (70\")           Constitutional:       Appearance: Healthy appearance. Not in distress.   Neck:      Vascular: JVD normal.   Pulmonary:      Effort: Pulmonary effort is normal.      Breath sounds: Normal breath sounds.   Cardiovascular:      PMI at left midclavicular line. Normal rate. Regular rhythm. Normal S2.       Murmurs: There is no murmur.      Comments: I do not appreciate a systolic murmur on today's exam.  Median sternotomy incision healed.  Pulses:     Intact distal pulses.   Edema:     Peripheral edema absent.   Skin:     General: Skin is warm and dry.   Neurological:      General: No focal deficit present.      Mental Status: Alert, oriented to person, place, and time and oriented to person, place and time.   Psychiatric:         Mood and Affect: Mood and affect normal.         Lab Review:     Lipid Panel          6/11/2024    11:45   Lipid Panel   Total Cholesterol 135    Triglycerides 109    HDL Cholesterol 48    VLDL Cholesterol 20    LDL Cholesterol  67      BUN   Date Value Ref Range Status   09/18/2024 19 8 - 23 mg/dL Final   10/07/2023 16 8 - 23 mg/dL Final     Creatinine   Date Value Ref Range Status   09/18/2024 1.05 0.76 - 1.27 mg/dL Final   10/07/2023 0.93 0.76 - 1.27 mg/dL Final   08/01/2023 1.00 0.60 - 1.30 mg/dL Final     Comment:     Serial Number: [id number]:  [id number]     Potassium   Date Value Ref Range Status   09/18/2024 4.9 3.5 - 5.2 mmol/L Final   10/07/2023 4.4 3.5 - 5.2 mmol/L Final     ALT (SGPT)   Date Value Ref Range Status   06/11/2024 17 1 - 41 U/L Final   09/29/2023 16 1 - 41 U/L Final     AST (SGOT)   Date Value Ref Range Status   06/11/2024 22 1 - 40 U/L Final "   09/29/2023 19 1 - 40 U/L Final         Performed        Assessment:          Diagnosis Plan   1. S/P AVR (aortic valve replacement)        2. Mixed hyperlipidemia        3. Aneurysm of ascending aorta without rupture                 Plan:         Very severe bicuspid aortic stenosis status post AVR 27 mm pericardial prosthesis, CABG LIMA-LAD with Dr. Mcduffie October 3, 2023: Recovering well.  Continue metoprolol and losartan.  BP controlled.  We discussed side effects of metoprolol and he does not have any significant side effects.  Even though he does not require beta-blockade, will keep this therapy going for now  Continue Lipitor and Zetia for CAD and his LIMA graft  Echo was repeated.  Looks great.  Repeat an echo in 2033, or for any new symptoms  Will need dental prophylaxis before any dental cleanings  Aspirin 81 for life  Hyperlipidemia: Well-controlled on Zetia and Lipitor per above.  Bicuspid aortic stenosis with ascending thoracic aorta dilation.  He follows with Dr. Mcduffie.  Most recent evaluation showed a proximal aorta of 4.2 cm on Intra-Op RAYRAY.  He is to follow with Dr. Mcduffie for further monitoring of his ascending thoracic aorta as this was not replaced.  He has repeat CT chest pending for next week  Patient's adult children, they were notified to get screened.  Hypertension: Decently controlled on current regimen.  Continue follow-up with PCP.     RTC 1 year     Nash Grey MD  [facility]  11/01/24  09:54 EDT       Current Outpatient Medications:     aspirin 81 MG chewable tablet, Chew 1 tablet Daily., Disp: , Rfl:     atorvastatin (LIPITOR) 40 MG tablet, TAKE 1 TABLET BY MOUTH AT NIGHT, Disp: 90 tablet, Rfl: 1    ezetimibe (ZETIA) 10 MG tablet, TAKE 1 TABLET BY MOUTH DAILY, Disp: 90 tablet, Rfl: 1    losartan (COZAAR) 50 MG tablet, Take 1 tablet by mouth Daily., Disp: 90 tablet, Rfl: 1    metFORMIN ER (GLUCOPHAGE-XR) 500 MG 24 hr tablet, TAKE 1 TABLET BY MOUTH WITH  DINNER, Disp:  90 tablet, Rfl: 1    metoprolol tartrate (LOPRESSOR) 50 MG tablet, TAKE 1 TABLET BY MOUTH EVERY 12  HOURS, Disp: 180 tablet, Rfl: 3  No current facility-administered medications for this visit.    Facility-Administered Medications Ordered in Other Visits:     Chlorhexidine Gluconate Cloth 2 % pads 1 application , 1 application , Topical, Q12H PRN, Rosa M Roy APRN         Return in about 1 year (around 11/1/2025).      Part of this note may be an electronic transcription/translation of spoken language to printed text using the Dragon Dictation System.

## 2024-11-01 NOTE — PATIENT INSTRUCTIONS
I think your heart is doing great.  Remember, anyone with a prosthetic heart valve does need to take an aspirin 81 mg/day.  This helps prevent any blood clots or tiny little particles from forming on the heart valve.  If you do need to do dental work, let us know and we can definitely prescribe antibiotics before any dental procedures.  If needed

## 2024-11-07 ENCOUNTER — HOSPITAL ENCOUNTER (OUTPATIENT)
Dept: CT IMAGING | Facility: HOSPITAL | Age: 74
Discharge: HOME | End: 2024-11-07
Admitting: NURSE PRACTITIONER
Payer: MEDICARE

## 2024-11-07 DIAGNOSIS — I71.21 ANEURYSM OF ASCENDING AORTA WITHOUT RUPTURE: ICD-10-CM

## 2024-11-07 PROCEDURE — 71250 CT THORAX DX C-: CPT

## 2024-12-11 ENCOUNTER — OFFICE VISIT (OUTPATIENT)
Dept: INTERNAL MEDICINE | Age: 74
End: 2024-12-11
Payer: MEDICARE

## 2024-12-11 VITALS
HEART RATE: 62 BPM | BODY MASS INDEX: 25.77 KG/M2 | HEIGHT: 70 IN | WEIGHT: 180 LBS | OXYGEN SATURATION: 98 % | DIASTOLIC BLOOD PRESSURE: 84 MMHG | TEMPERATURE: 97.1 F | SYSTOLIC BLOOD PRESSURE: 148 MMHG

## 2024-12-11 DIAGNOSIS — I10 PRIMARY HYPERTENSION: Primary | Chronic | ICD-10-CM

## 2024-12-11 DIAGNOSIS — R42 DIZZINESS: ICD-10-CM

## 2024-12-11 DIAGNOSIS — R73.03 PREDIABETES: Chronic | ICD-10-CM

## 2024-12-11 DIAGNOSIS — E78.2 MIXED HYPERLIPIDEMIA: Chronic | ICD-10-CM

## 2024-12-11 PROCEDURE — 1159F MED LIST DOCD IN RCRD: CPT | Performed by: INTERNAL MEDICINE

## 2024-12-11 PROCEDURE — 3077F SYST BP >= 140 MM HG: CPT | Performed by: INTERNAL MEDICINE

## 2024-12-11 PROCEDURE — 99214 OFFICE O/P EST MOD 30 MIN: CPT | Performed by: INTERNAL MEDICINE

## 2024-12-11 PROCEDURE — 3079F DIAST BP 80-89 MM HG: CPT | Performed by: INTERNAL MEDICINE

## 2024-12-11 PROCEDURE — 3044F HG A1C LEVEL LT 7.0%: CPT | Performed by: INTERNAL MEDICINE

## 2024-12-11 PROCEDURE — G2211 COMPLEX E/M VISIT ADD ON: HCPCS | Performed by: INTERNAL MEDICINE

## 2024-12-11 PROCEDURE — 1126F AMNT PAIN NOTED NONE PRSNT: CPT | Performed by: INTERNAL MEDICINE

## 2024-12-11 PROCEDURE — 1160F RVW MEDS BY RX/DR IN RCRD: CPT | Performed by: INTERNAL MEDICINE

## 2024-12-11 NOTE — ASSESSMENT & PLAN NOTE
Intermittent episodes of vertigo which is most likely BPPV related.  I recommended vestibular evaluation for this.  He would like to think about it and defers this for now.  I advised him to avoid climbing up ladders as he is on aspirin and this may increase risk for falling injuries.

## 2024-12-11 NOTE — ASSESSMENT & PLAN NOTE
"BP Readings from Last 3 Encounters:   12/11/24 148/84   11/01/24 138/78   09/18/24 146/78      Blood pressure remains high but experiencing some \"dizziness\" symptoms intermittently (most likely BPPV).     Continue losartan 50 mg and metoprolol to tartrate 50 mg twice daily for now.  Continue to keep blood pressure observed.  "

## 2024-12-11 NOTE — ASSESSMENT & PLAN NOTE
Lab Results   Component Value Date    HGBA1C 6.20 (H) 09/18/2024    HGBA1C 6.20 (H) 06/11/2024    HGBA1C 6.10 (H) 12/11/2023       Will plan to check A1c on follow-up.  Continue metformin  mg daily.  Improve diet by limiting sugars, starches and carbohydrates.

## 2024-12-11 NOTE — PROGRESS NOTES
"                            J  U  N  O  H    K  I  M ,   M  D                  I  N  T  E  R  N  A  L    M  E  D  I  C  I  N  E         ENCOUNTER DATE:  12/11/2024    Jeremi Shankar Sr. / 74 y.o. / male    OFFICE VISIT ENCOUNTER       CHIEF COMPLAINT / REASON FOR OFFICE VISIT     Hypertension, Prediabetes, and Hyperlipidemia      ASSESSMENT & PLAN     Problem List Items Addressed This Visit          High    Primary hypertension - Primary (Chronic)    Current Assessment & Plan     BP Readings from Last 3 Encounters:   12/11/24 148/84   11/01/24 138/78   09/18/24 146/78      Blood pressure remains high but experiencing some \"dizziness\" symptoms intermittently (most likely BPPV).     Continue losartan 50 mg and metoprolol to tartrate 50 mg twice daily for now.  Continue to keep blood pressure observed.         Relevant Medications    metoprolol tartrate (LOPRESSOR) 50 MG tablet    losartan (COZAAR) 50 MG tablet    Dizziness    Current Assessment & Plan     Intermittent episodes of vertigo which is most likely BPPV related.  I recommended vestibular evaluation for this.  He would like to think about it and defers this for now.  I advised him to avoid climbing up ladders as he is on aspirin and this may increase risk for falling injuries.            Medium    Prediabetes (Chronic)    Overview     Continue metformin  mg daily         Current Assessment & Plan     Lab Results   Component Value Date    HGBA1C 6.20 (H) 09/18/2024    HGBA1C 6.20 (H) 06/11/2024    HGBA1C 6.10 (H) 12/11/2023       Will plan to check A1c on follow-up.  Continue metformin  mg daily.  Improve diet by limiting sugars, starches and carbohydrates.         Relevant Medications    metFORMIN ER (GLUCOPHAGE-XR) 500 MG 24 hr tablet    Hyperlipidemia (Chronic)    Overview     Continue   atorvastatin 40 mg qd   Zetia 10 mg daily         Relevant Medications    ezetimibe (ZETIA) 10 MG tablet    atorvastatin (LIPITOR) 40 MG tablet     No " "orders of the defined types were placed in this encounter.    No orders of the defined types were placed in this encounter.      SUMMARY/DISCUSSION        TOTAL TIME OF ENCOUNTER:        Next Appointment with me: Visit date not found    Return in about 6 months (around 6/11/2025) for **SCHEDULE COMBINED AWV AND MEDICAL F/U**.      VITAL SIGNS     Vitals:    12/11/24 1043   BP: 148/84   Pulse: 62   Temp: 97.1 °F (36.2 °C)   SpO2: 98%   Weight: 81.6 kg (180 lb)   Height: 177.8 cm (70\")       BP Readings from Last 3 Encounters:   12/11/24 148/84   11/01/24 138/78   09/18/24 146/78     Wt Readings from Last 3 Encounters:   12/11/24 81.6 kg (180 lb)   11/01/24 81.9 kg (180 lb 9.6 oz)   09/18/24 80.3 kg (177 lb)     Body mass index is 25.83 kg/m².    Blood pressure readings recorded on patient flowsheet:       No data to display                  MEDICATIONS AT THE TIME OF OFFICE VISIT     Current Outpatient Medications on File Prior to Visit   Medication Sig    aspirin 81 MG chewable tablet Chew 1 tablet Daily.    atorvastatin (LIPITOR) 40 MG tablet TAKE 1 TABLET BY MOUTH AT NIGHT    ezetimibe (ZETIA) 10 MG tablet TAKE 1 TABLET BY MOUTH DAILY    losartan (COZAAR) 50 MG tablet Take 1 tablet by mouth Daily.    metFORMIN ER (GLUCOPHAGE-XR) 500 MG 24 hr tablet TAKE 1 TABLET BY MOUTH WITH  DINNER    metoprolol tartrate (LOPRESSOR) 50 MG tablet TAKE 1 TABLET BY MOUTH EVERY 12  HOURS     Current Facility-Administered Medications on File Prior to Visit   Medication    Chlorhexidine Gluconate Cloth 2 % pads 1 application           HISTORY OF PRESENT ILLNESS     Blood pressure remains consistently greater than 140/80 at home.  Previously we increased losartan to 50 mg daily and he continues to take metoprolol titrate 50 mg twice daily.  He complains of intermittent episodes of brisk dizziness spells which sounds to be related to positional vertigo.  He denies lightheadedness or orthostatic symptoms.  Denies any chest pains, dyspnea " "exertion or heart palpitations.  Denies any other focal neurological changes.  Previous A1c was higher at 6.2 and he remains on metformin  mg daily.  He reports to not watching his diet as closely as he should.  He remains on atorvastatin 40 mg and ezetimibe 10 mg daily for hyperlipidemia.  He takes aspirin 81 mg daily.    Patient Care Team:  Jhon Alvarez MD as PCP - General (Internal Medicine)  Nash Grey MD as Consulting Physician (Cardiology)    REVIEW OF SYSTEMS     Review of Systems       PHYSICAL EXAMINATION     Physical Exam  Alert with normal thought and judgment.   No carotid bruit   Cardiovascular: Normal rate, regular rhythm.  Pulm/Chest: Effort normal, breath sounds normal.       REVIEWED DATA     Labs:     Lab Results   Component Value Date     09/18/2024    K 4.9 09/18/2024    CALCIUM 9.9 09/18/2024    AST 22 06/11/2024    ALT 17 06/11/2024    BUN 19 09/18/2024    CREATININE 1.05 09/18/2024    CREATININE 1.05 06/11/2024    CREATININE 0.93 10/07/2023    EGFRRESULT 74.5 09/18/2024     Lab Results   Component Value Date    HGBA1C 6.20 (H) 09/18/2024    HGBA1C 6.20 (H) 06/11/2024    HGBA1C 6.10 (H) 12/11/2023     Lab Results   Component Value Date    LDL 67 06/11/2024    LDL 58 09/29/2023    LDL 79 05/09/2023    HDL 48 06/11/2024    HDL 48 09/29/2023    TRIG 109 06/11/2024    TRIG 115 09/29/2023     Lab Results   Component Value Date    TSH 1.220 06/18/2021    TSH 1.600 06/16/2020    FREET4 1.04 06/18/2021    FREET4 1.10 06/16/2020     Lab Results   Component Value Date    WBC 6.23 10/07/2023    HGB 10.7 (L) 10/07/2023     10/07/2023     No results found for: \"MALBCRERATIO\"        Imaging:               Medical Tests:     Bilateral Carotid Duplex (09/29/2023 09:31)       Right ICA:  Imaging indicates <50% stenosis.        Right ECA:  Imaging indicates atherosclerotic plaque.       Right Vertebral:  Antegrade flow is present.           Left ICA:  Imaging indicates <50% stenosis.       "  Left ECA:  Imaging indicates atherosclerotic plaque.       Left Vertebral:  Antegrade flow is present.       Adult Transthoracic Echo Complete w/ Color, Spectral and Contrast if Necessary Per Protocol (11/20/2023 08:48)     Left ventricular systolic function is normal. Calculated left ventricular EF = 57.5% Normal left ventricular cavity size and wall thickness noted. All left ventricular wall segments contract normally. Left ventricular diastolic function was normal.    There is a bioprosthetic aortic valve present. The aortic valve peak and mean gradients are within defined limits. The prosthetic aortic valve is normal. There is no significant prosthetic valve stenosis or regurgitation Pericardial prosthesis    Mild mitral valve regurgitation is present    Trace tricuspid valve regurgitation is present. Estimated right ventricular systolic pressure from tricuspid regurgitation is normal (<35 mmHg). Calculated right ventricular systolic pressure from tricuspid regurgitation is 18 mmHg.      Summary of old records / correspondence / consultant report:             Request outside records:

## 2024-12-12 DIAGNOSIS — R73.03 PREDIABETES: ICD-10-CM

## 2024-12-12 RX ORDER — METFORMIN HYDROCHLORIDE 500 MG/1
TABLET, EXTENDED RELEASE ORAL
Qty: 90 TABLET | Refills: 1 | Status: SHIPPED | OUTPATIENT
Start: 2024-12-12

## 2025-02-04 ENCOUNTER — OFFICE VISIT (OUTPATIENT)
Dept: CARDIAC SURGERY | Facility: CLINIC | Age: 75
End: 2025-02-04
Payer: MEDICARE

## 2025-02-04 VITALS
SYSTOLIC BLOOD PRESSURE: 161 MMHG | WEIGHT: 172 LBS | HEART RATE: 69 BPM | DIASTOLIC BLOOD PRESSURE: 80 MMHG | OXYGEN SATURATION: 95 % | BODY MASS INDEX: 24.62 KG/M2 | RESPIRATION RATE: 18 BRPM | HEIGHT: 70 IN

## 2025-02-04 DIAGNOSIS — E78.2 MIXED HYPERLIPIDEMIA: Chronic | ICD-10-CM

## 2025-02-04 DIAGNOSIS — Z95.2 S/P AVR (AORTIC VALVE REPLACEMENT): Chronic | ICD-10-CM

## 2025-02-04 DIAGNOSIS — I71.21 ANEURYSM OF ASCENDING AORTA WITHOUT RUPTURE: Primary | ICD-10-CM

## 2025-02-04 DIAGNOSIS — I10 PRIMARY HYPERTENSION: Chronic | ICD-10-CM

## 2025-02-04 PROCEDURE — 1160F RVW MEDS BY RX/DR IN RCRD: CPT | Performed by: NURSE PRACTITIONER

## 2025-02-04 PROCEDURE — 99214 OFFICE O/P EST MOD 30 MIN: CPT | Performed by: NURSE PRACTITIONER

## 2025-02-04 PROCEDURE — 1159F MED LIST DOCD IN RCRD: CPT | Performed by: NURSE PRACTITIONER

## 2025-02-04 PROCEDURE — 3079F DIAST BP 80-89 MM HG: CPT | Performed by: NURSE PRACTITIONER

## 2025-02-04 PROCEDURE — 3077F SYST BP >= 140 MM HG: CPT | Performed by: NURSE PRACTITIONER

## 2025-02-04 NOTE — PROGRESS NOTES
2/4/2025      Subjective:      Jhon Alvarez MD    Chief Complaint: Follow-up bicuspid aortopathy, ascending aortic dilatation    History of Present Illness:       Dear Jhon Castellano MD and Colleagues,    It was nice to see Jeremi GARDNER Raad Carrizales in [facility] for follow-up. He is a 74 y.o. male with bicuspid aortic stenosis/ aortopathy, 4.2 cm aortic root dilatation, ascending aortic dilatation, CAD, HTN, HLD, and DM type II.  His bicuspid aortopathy was found during a workup for a murmur.  He was initially found to have a bicuspid aortic valve with severe stenosis on echo prompting CT scan and cardiac cath revealing a 4.7 cm ascending aorta and single-vessel proximal LAD lesion.  He was initially seen by Dr. Mcduffie in August 2023 with recommendations for surgical repair.  He subsequently underwent elective AVR with 27 mm magna pericardial prosthesis and elective CABG x 1 with LIMA to mid LAD by Dr. Mcduffie on 10/3/2023.  He comes in today for routine follow-up for aneurysm surveillence.  The CT of chest without contrast on 11/7/2024 was reviewed.  The aortic root measures 3.8 cm, ascending aorta measures 4 cm, and DTA measures 2.4 cm.  These measurements are stable.  Incidental findings on scan showed moderate coronary calcifications and RLL calcified granulomas.  Patient is aware of these findings.  His last echo was October 20, 2023 showing EF 57% aortic valve gradients within defined limits no AS/ AI, mild MR, trace TR.  He denies shortness of breath, chest/back pain, numbness/tingling/pain of extremities.  No vascular deficiencies or hyperextensible or hypermobile joints are noted on exam.  The patient's family history is negative for aneurysms or dissections, negative for connective tissue disease, and negative for coronary disease in first degree family members.  His BP today is 161/80.  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  He is retired  from [employer] and has never smoked.  He remains active by cutting down trees and chopping wood.  He is alone for the appt today.        Patient Active Problem List   Diagnosis    History of colon polyps    Primary hypertension    Prediabetes    Hyperlipidemia    Peyronie's disease    Aneurysm of ascending aorta without rupture    Ascending aortic aneurysm    S/P AVR (aortic valve replacement)    Dizziness       Past Medical History:   Diagnosis Date    Abnormal ECG 10/3/2023    Aneurysm 10-3-202÷    Had open heart surgery    Aortic stenosis 09/29/2023    Aortic stenosis due to bicuspid aortic valve     Arthritis 2019    arthritis is in my hands    Benign essential hypertension     Bilateral carpal tunnel syndrome 06/16/2020    Cataract 2021    Eye doctor saw them in exam    CHF (congestive heart failure)     Colon polyp 2014 or 2015    Coronary artery disease 10-3-2023    Had open heart surgery    Diverticulosis of large intestine 03/06/2016    Description: March 2015????????colonoscopy????????Dr. Francis????????diverticulosis/colon polyps (2).    Headache 1965    Hiatal hernia     I have had 3 hernia  surgeries    History of transfusion     Hyperlipidemia     Type 2 diabetes mellitus     Visual impairment     glasses for reading       Past Surgical History:   Procedure Laterality Date    AORTIC VALVE REPAIR/REPLACEMENT  10-3-2023    Had valve replacement    AORTIC VALVE SURGERY  [id number]    ARTERIAL BYPASS SURGERY  10-3-2023    ASCENDING AORTIC ANEURYSM REPAIR W/ MECHANICAL AORTIC VALVE REPLACEMENT N/A 10/03/2023    Procedure: INTRAOPERATIVE RAYRAY; STERNOTOMY; CORONARY ARTERY BYPASS GRAFT TIMES ONE USING LEFT INTERNAL MAMMARY ARTERY; AORTIC VALVE REPLACEMENT; NEURO MONITORING AND PRP.;  Surgeon: Benton Mcduffie MD;  Location: [facility];  Service: Cardiothoracic;  Laterality: N/A;    CARDIAC CATHETERIZATION N/A 08/08/2023    Procedure: Right Heart Cath;  Surgeon: Kun Desai MD;  Location: [facility]ATH INVASIVE  LOCATION;  Service: Cardiovascular;  Laterality: N/A;    CARDIAC CATHETERIZATION N/A 08/08/2023    Procedure: Coronary angiography;  Surgeon: Kun Desai MD;  Location: [facility] INVASIVE LOCATION;  Service: Cardiovascular;  Laterality: N/A;    CARDIAC VALVE REPLACEMENT  10-3-2023    These questions seem repetitive    CARPAL TUNNEL RELEASE Left 05/2022    CATARACT EXTRACTION Right 12/2024    CATARACT EXTRACTION Left 11/2024    COLONOSCOPY  2007    Dr Francis --WNL.  March 2015â€“Dr. Hernandez€“colon polyps (2); diverticulosis.    COLONOSCOPY  2014?    polyps--DR Francis    CORONARY ARTERY BYPASS GRAFT  10-3-2023    HERNIA REPAIR      (R) REPAIR TWICE    TRICUSPID VALVE REPLACEMENT  [id number]    TRICUSPID VALVE SURGERY  10-3-2023       Allergies   Allergen Reactions    Lisinopril Cough         Current Outpatient Medications:     aspirin 81 MG chewable tablet, Chew 1 tablet Daily., Disp: , Rfl:     atorvastatin (LIPITOR) 40 MG tablet, TAKE 1 TABLET BY MOUTH AT NIGHT, Disp: 90 tablet, Rfl: 1    ezetimibe (ZETIA) 10 MG tablet, TAKE 1 TABLET BY MOUTH DAILY, Disp: 90 tablet, Rfl: 1    losartan (COZAAR) 50 MG tablet, Take 1 tablet by mouth Daily., Disp: 90 tablet, Rfl: 1    metFORMIN ER (GLUCOPHAGE-XR) 500 MG 24 hr tablet, TAKE 1 TABLET BY MOUTH WITH  DINNER, Disp: 90 tablet, Rfl: 1    metoprolol tartrate (LOPRESSOR) 50 MG tablet, TAKE 1 TABLET BY MOUTH EVERY 12  HOURS, Disp: 180 tablet, Rfl: 3  No current facility-administered medications for this visit.    Facility-Administered Medications Ordered in Other Visits:     Chlorhexidine Gluconate Cloth 2 % pads 1 application , 1 application , Topical, Q12H BUZZN, Rosa M Roy, APRN    Social History     Socioeconomic History    Marital status:      Spouse name: Mia    Number of children: 2   Tobacco Use    Smoking status: Never     Passive exposure: Never    Smokeless tobacco: Never    Tobacco comments:     Never Smoked   Vaping Use    Vaping status: Never  Used   Substance and Sexual Activity    Alcohol use: Never    Drug use: Never    Sexual activity: Yes     Partners: Female     Birth control/protection: Post-menopausal       Family History   Problem Relation Age of Onset    Hypertension Mother         passed at 87    Heart attack Father 86        My Father passed due to heart failure    Heart disease Father     No Known Problems Sister     Asthma Brother          of covid complications    Coronary artery disease Brother     No Known Problems Brother     Diabetes Maternal Grandfather     Dementia Neg Hx     Colon cancer Neg Hx     Prostate cancer Neg Hx     Malig Hyperthermia Neg Hx            Review of Systems:  Review of Systems   Respiratory:  Negative for shortness of breath.    Cardiovascular:  Negative for chest pain, palpitations and leg swelling.   Neurological:  Negative for dizziness and light-headedness.       Physical Exam:    Vital Signs:  Weight: 78 kg (172 lb)   Body mass index is 24.68 kg/m².  Temp: (not recorded)   Heart Rate: 69   BP: 161/80     Physical Exam  Vitals and nursing note reviewed.   Constitutional:       General: He is awake.      Appearance: Normal appearance. He is well-developed and well-groomed.   HENT:      Head: Normocephalic and atraumatic.      Nose: Nose normal.      Mouth/Throat:      Lips: Pink.      Mouth: Mucous membranes are moist.      Pharynx: Uvula midline.   Eyes:      General: Lids are normal. No scleral icterus.     Extraocular Movements: Extraocular movements intact.      Conjunctiva/sclera: Conjunctivae normal.      Pupils: Pupils are equal, round, and reactive to light.   Neck:      Thyroid: No thyroid mass or thyromegaly.      Vascular: Normal carotid pulses. No carotid bruit, hepatojugular reflux or JVD.      Trachea: Trachea normal.   Cardiovascular:      Rate and Rhythm: Normal rate and regular rhythm.      Pulses:           Carotid pulses are 2+ on the right side and 2+ on the left side.       Radial  pulses are 2+ on the right side and 2+ on the left side.        Femoral pulses are 2+ on the right side and 2+ on the left side.       Popliteal pulses are 2+ on the right side and 2+ on the left side.        Dorsalis pedis pulses are 2+ on the right side and 2+ on the left side.        Posterior tibial pulses are 2+ on the right side and 2+ on the left side.      Heart sounds: Normal heart sounds. No murmur heard.  Pulmonary:      Effort: Pulmonary effort is normal.      Breath sounds: Normal breath sounds.   Abdominal:      General: Bowel sounds are normal. There is no distension.      Palpations: Abdomen is soft.      Tenderness: There is no abdominal tenderness.   Musculoskeletal:      Cervical back: Neck supple.      Right lower leg: No edema.      Left lower leg: No edema.      Comments: Gait steady and strong without use of assistive devices   Lymphadenopathy:      Cervical: No cervical adenopathy.      Upper Body:      Right upper body: No supraclavicular adenopathy.      Left upper body: No supraclavicular adenopathy.   Skin:     General: Skin is warm and dry.      Capillary Refill: Capillary refill takes less than 2 seconds.      Findings: No erythema or rash.      Nails: There is no clubbing.             Comments: Sternotomy well healed.   Neurological:      Mental Status: He is alert and oriented to person, place, and time.      GCS: GCS eye subscore is 4. GCS verbal subscore is 5. GCS motor subscore is 6.   Psychiatric:         Attention and Perception: Attention and perception normal.         Mood and Affect: Mood and affect normal.         Speech: Speech normal.         Behavior: Behavior normal. Behavior is cooperative.         Thought Content: Thought content normal.         Cognition and Memory: Cognition and memory normal.         Judgment: Judgment normal.          Assessment:     Severe bicuspid aortic stenosis/ aortopathy--s/p elective AVR with 27 mm magna pericardial prosthesis and elective  [facility]  Ascending aortic dilatation, 4 cm--stable  CAD--s/p CABG x1 with LIMA to mid LAD [facility]followed by cardiology  HTN, elevated reading today--has NOT had meds today  HLD--statin  DM type 2--per PCP    Recommendation/Plan:       Continued risk factor modification of hypertension with a goal blood pressure less than 130/80, hyperlipidemia optimization, and continued avoidance of tobacco/nicotine products.    We discussed the importance of avoidance of over the counter decongestants and stimulants, specifically pseudoephedrine, for hypertension and aneurysm management.    Initiation of low aerobic exercise is indicated to help reduce body habitus, assist with blood pressure and cholesterol control.       Although risk of rupture is low, emergency department presentation is warranted for acute chest, back, or abdominal pain, syncope, or stroke like symptoms.    Follow up in [facility] in 1 year(s) with CT scan of chest without contra ascending aortic dilatation is stable.  His prosthetic aortic valve has normal gradients on last echo.   His his blood pressure is greatly elevated today.  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  I asked him to go straight home to take his medication.    I spent approximately 30 minutes total in evaluating the data, examining the patient, discussing the options and counseling.  Independent interpretation of imaging.  Imaging shown to pt.     Thank you for allowing us to participate in his care.    Regards,    Jyothi Whitley, CLEO      **All problems and history are new to this examiner.  Extensive review of records prior to and during patient visit

## 2025-02-05 DIAGNOSIS — I71.21 ANEURYSM OF ASCENDING AORTA WITHOUT RUPTURE: Primary | ICD-10-CM

## 2025-02-24 DIAGNOSIS — E78.2 MIXED HYPERLIPIDEMIA: ICD-10-CM

## 2025-02-24 RX ORDER — ATORVASTATIN CALCIUM 40 MG/1
40 TABLET, FILM COATED ORAL
Qty: 90 TABLET | Refills: 1 | Status: SHIPPED | OUTPATIENT
Start: 2025-02-24

## 2025-02-24 RX ORDER — EZETIMIBE 10 MG/1
10 TABLET ORAL DAILY
Qty: 90 TABLET | Refills: 1 | Status: SHIPPED | OUTPATIENT
Start: 2025-02-24

## 2025-03-06 DIAGNOSIS — I10 PRIMARY HYPERTENSION: Chronic | ICD-10-CM

## 2025-03-07 RX ORDER — LOSARTAN POTASSIUM 50 MG/1
50 TABLET ORAL DAILY
Qty: 90 TABLET | Refills: 1 | Status: SHIPPED | OUTPATIENT
Start: 2025-03-07

## 2025-05-12 DIAGNOSIS — R73.03 PREDIABETES: ICD-10-CM

## 2025-05-12 RX ORDER — METFORMIN HYDROCHLORIDE 500 MG/1
500 TABLET, EXTENDED RELEASE ORAL
Qty: 90 TABLET | Refills: 1 | Status: SHIPPED | OUTPATIENT
Start: 2025-05-12

## 2025-06-11 ENCOUNTER — OFFICE VISIT (OUTPATIENT)
Dept: INTERNAL MEDICINE | Age: 75
End: 2025-06-11
Payer: MEDICARE

## 2025-06-11 VITALS
SYSTOLIC BLOOD PRESSURE: 164 MMHG | HEIGHT: 70 IN | OXYGEN SATURATION: 96 % | WEIGHT: 175 LBS | HEART RATE: 60 BPM | DIASTOLIC BLOOD PRESSURE: 92 MMHG | TEMPERATURE: 97.1 F | BODY MASS INDEX: 25.05 KG/M2

## 2025-06-11 DIAGNOSIS — R73.03 PREDIABETES: Chronic | ICD-10-CM

## 2025-06-11 DIAGNOSIS — E78.00 PURE HYPERCHOLESTEROLEMIA: Chronic | ICD-10-CM

## 2025-06-11 DIAGNOSIS — Z95.2 S/P AVR (AORTIC VALVE REPLACEMENT): Chronic | ICD-10-CM

## 2025-06-11 DIAGNOSIS — Z12.5 SCREENING PSA (PROSTATE SPECIFIC ANTIGEN): ICD-10-CM

## 2025-06-11 DIAGNOSIS — Z00.00 MEDICARE ANNUAL WELLNESS VISIT, SUBSEQUENT: Primary | ICD-10-CM

## 2025-06-11 DIAGNOSIS — I10 PRIMARY HYPERTENSION: Chronic | ICD-10-CM

## 2025-06-11 LAB
ALBUMIN SERPL-MCNC: 4.8 G/DL (ref 3.5–5.2)
ALBUMIN/GLOB SERPL: 2.2 G/DL
ALP SERPL-CCNC: 80 U/L (ref 39–117)
ALT SERPL-CCNC: 18 U/L (ref 1–41)
APPEARANCE UR: CLEAR
AST SERPL-CCNC: 26 U/L (ref 1–40)
BASOPHILS # BLD AUTO: 0.01 10*3/MM3 (ref 0–0.2)
BASOPHILS NFR BLD AUTO: 0.2 % (ref 0–1.5)
BILIRUB SERPL-MCNC: 0.6 MG/DL (ref 0–1.2)
BILIRUB UR QL STRIP: NEGATIVE
BUN SERPL-MCNC: 22 MG/DL (ref 8–23)
BUN/CREAT SERPL: 20.2 (ref 7–25)
CALCIUM SERPL-MCNC: 9.8 MG/DL (ref 8.6–10.5)
CHLORIDE SERPL-SCNC: 102 MMOL/L (ref 98–107)
CHOLEST SERPL-MCNC: 148 MG/DL (ref 0–200)
CHOLEST/HDLC SERPL: 3.29 {RATIO}
CO2 SERPL-SCNC: 26.6 MMOL/L (ref 22–29)
COLOR UR: YELLOW
CREAT SERPL-MCNC: 1.09 MG/DL (ref 0.76–1.27)
EGFRCR SERPLBLD CKD-EPI 2021: 70.8 ML/MIN/1.73
EOSINOPHIL # BLD AUTO: 0.08 10*3/MM3 (ref 0–0.4)
EOSINOPHIL NFR BLD AUTO: 1.8 % (ref 0.3–6.2)
ERYTHROCYTE [DISTWIDTH] IN BLOOD BY AUTOMATED COUNT: 14.2 % (ref 12.3–15.4)
GLOBULIN SER CALC-MCNC: 2.2 GM/DL
GLUCOSE SERPL-MCNC: 98 MG/DL (ref 65–99)
GLUCOSE UR QL STRIP: NEGATIVE
HBA1C MFR BLD: 5.9 % (ref 4.8–5.6)
HCT VFR BLD AUTO: 45.7 % (ref 37.5–51)
HDLC SERPL-MCNC: 45 MG/DL (ref 40–60)
HGB BLD-MCNC: 14.9 G/DL (ref 13–17.7)
HGB UR QL STRIP: NEGATIVE
IMM GRANULOCYTES # BLD AUTO: 0.01 10*3/MM3 (ref 0–0.05)
IMM GRANULOCYTES NFR BLD AUTO: 0.2 % (ref 0–0.5)
KETONES UR QL STRIP: NEGATIVE
LDLC SERPL CALC-MCNC: 84 MG/DL (ref 0–100)
LEUKOCYTE ESTERASE UR QL STRIP: NEGATIVE
LYMPHOCYTES # BLD AUTO: 1.62 10*3/MM3 (ref 0.7–3.1)
LYMPHOCYTES NFR BLD AUTO: 36.4 % (ref 19.6–45.3)
MCH RBC QN AUTO: 28 PG (ref 26.6–33)
MCHC RBC AUTO-ENTMCNC: 32.6 G/DL (ref 31.5–35.7)
MCV RBC AUTO: 85.9 FL (ref 79–97)
MONOCYTES # BLD AUTO: 0.5 10*3/MM3 (ref 0.1–0.9)
MONOCYTES NFR BLD AUTO: 11.2 % (ref 5–12)
NEUTROPHILS # BLD AUTO: 2.23 10*3/MM3 (ref 1.7–7)
NEUTROPHILS NFR BLD AUTO: 50.2 % (ref 42.7–76)
NITRITE UR QL STRIP: NEGATIVE
NRBC BLD AUTO-RTO: 0 /100 WBC (ref 0–0.2)
PH UR STRIP: 6 [PH] (ref 5–8)
PLATELET # BLD AUTO: 181 10*3/MM3 (ref 140–450)
POTASSIUM SERPL-SCNC: 4.5 MMOL/L (ref 3.5–5.2)
PROT SERPL-MCNC: 7 G/DL (ref 6–8.5)
PROT UR QL STRIP: NEGATIVE
PSA SERPL-MCNC: 1.27 NG/ML (ref 0–4)
RBC # BLD AUTO: 5.32 10*6/MM3 (ref 4.14–5.8)
SODIUM SERPL-SCNC: 139 MMOL/L (ref 136–145)
SP GR UR STRIP: 1.01 (ref 1–1.03)
TRIGL SERPL-MCNC: 100 MG/DL (ref 0–150)
UROBILINOGEN UR STRIP-MCNC: NORMAL MG/DL
VLDLC SERPL CALC-MCNC: 19 MG/DL (ref 5–40)
WBC # BLD AUTO: 4.45 10*3/MM3 (ref 3.4–10.8)

## 2025-06-11 NOTE — ASSESSMENT & PLAN NOTE
BP Readings from Last 3 Encounters:   06/11/25 164/92   05/06/25 134/81   02/04/25 161/80      Blood pressure is high today but reportedly lower at home.   Check blood pressure twice daily x 2 weeks and send for review.   Maintain low sodium diet of < 2500 mg / day.   Continue losartan 50 mg daily for now.

## 2025-06-11 NOTE — ASSESSMENT & PLAN NOTE
Patient has been erroneously marked as diabetic. Based on the available clinical information, he does not have diabetes and should therefore be excluded from diabetic health maintenance and quality measures for the remainder of the reporting period.     Check lab. Maintain a low sugar/starch/carbohydrate diet. Continue metformin  mg daily for now.     Lab Results   Component Value Date    HGBA1C 6.20 (H) 09/18/2024    HGBA1C 6.20 (H) 06/11/2024    HGBA1C 6.10 (H) 12/11/2023

## 2025-06-11 NOTE — PROGRESS NOTES
J  U  N  O  H    K  I  M ,   M  D                               I  N  T  E  R  N  A  L    M  E  D  I  C  I  N  E         ENCOUNTER DATE:  06/11/2025    Jeremi Shankar Sr. / 74 y.o. / male    MEDICARE ANNUAL WELLNESS VISIT       CHIEF COMPLAINT / REASON FOR OFFICE VISIT     Medicare Wellness-subsequent (12/11/23), Hypertension, and Hyperlipidemia        Patient's general assessment of his health since a year ago:     - Compared to one year ago, he feels his physical health is:   STABLE/SAME    - Compared to one year ago, he feels his emotional health is:  STABLE/SAME    - Compared to one year ago, he feels his cognitive status is:  STABLE/SAME    Recent Hospitalization (within past 365 days) (NO unless indicated)  No    Patient Care Team:    Patient Care Team:  Jhon Alvarez MD as PCP - General (Internal Medicine)  Nash Grey MD as Consulting Physician (Cardiology)  Jyothi Whitley APRN as Nurse Practitioner (Cardiothoracic Surgery)  Markell Simmons DO as Consulting Physician (Ophthalmology)    Allergies:  Lisinopril    Medications:  Current Outpatient Medications on File Prior to Visit   Medication Sig Dispense Refill    aspirin 81 MG chewable tablet Chew 1 tablet Daily.      atorvastatin (LIPITOR) 40 MG tablet TAKE 1 TABLET BY MOUTH AT NIGHT 90 tablet 1    ezetimibe (ZETIA) 10 MG tablet TAKE 1 TABLET BY MOUTH DAILY 90 tablet 1    losartan (COZAAR) 50 MG tablet TAKE 1 TABLET BY MOUTH DAILY 90 tablet 1    metFORMIN ER (GLUCOPHAGE-XR) 500 MG 24 hr tablet TAKE 1 TABLET BY MOUTH WITH  DINNER 90 tablet 1    metoprolol tartrate (LOPRESSOR) 50 MG tablet TAKE 1 TABLET BY MOUTH EVERY 12  HOURS 180 tablet 3     Current Facility-Administered Medications on File Prior to Visit   Medication Dose Route Frequency Provider Last Rate Last Admin    Chlorhexidine Gluconate Cloth 2 % pads 1 application   1 application  Topical Q12H PRN Rosa M Roy APRN             No opioid medication identified on active medication list. I have reviewed chart for other potential  high risk medication/s and harmful drug interactions in the elderly.        Vitals:    06/11/25 0905   PainSc: 0-No pain      Is the patient on opioid medication for pain:   No opioid listed on medication list       HPI FOR OTHER ACTIVE MEDICAL PROBLEMS:    Patient denies any significant problems or changes.  He stays quite physically active tending to 15 acres of property on regular basis.  Denies chest pain, dyspnea exertion or palpitations.  Status post AVR and CABG x 1 in 2023 followed by cardiology.  Blood pressure at home is generally around 140s but does not check his blood pressure regularly.  He reports compliance with losartan 50 mg daily along with metoprolol tartrate 50 mg twice daily.  He has history of prediabetes with most recent A1c of 6.2 in September 2024 on metformin  mg.  He is on atorvastatin 10 mg and ezetimibe 10 mg for hyperlipidemia.    Patient states that his wife verbalized concern about his increasing forgetfulness.  He states that he does not have any issues with remembering things that are important to him.       HISTORY     PFSH:     The following portions of the patient's history were reviewed and updated as appropriate: Allergies / Current Medications / Past Medical History / Surgical History / Social History / Family History    Problem List:  Patient Active Problem List   Diagnosis    History of colon polyps    Primary hypertension    Prediabetes    Hyperlipidemia    Peyronie's disease    Aneurysm of ascending aorta without rupture    Ascending aortic aneurysm    S/P AVR (aortic valve replacement)    Dizziness       Past Medical History:  Past Medical History:   Diagnosis Date    Aneurysm 10-3-202÷    Had open heart surgery    Aortic stenosis 09/29/2023    Aortic stenosis due to bicuspid aortic valve     Arthritis 2019    arthritis is in my hands    Benign essential  hypertension     Bilateral carpal tunnel syndrome 06/16/2020    Cataract 2021    Eye doctor saw them in exam    CHF (congestive heart failure)     Colon polyp 2014 or 2015    Coronary artery disease 10-3-2023    Had open heart surgery    Diverticulosis of large intestine 03/06/2016    Description: March 2015????????colonoscopy????????Dr. Francis????????diverticulosis/colon polyps (2).    Hiatal hernia     I have had 3 hernia  surgeries    History of transfusion     Hyperlipidemia     Type 2 diabetes mellitus        Past Surgical History:  Past Surgical History:   Procedure Laterality Date    AORTIC VALVE REPAIR/REPLACEMENT  10-3-2023    Had valve replacement    AORTIC VALVE SURGERY  [id number]    ARTERIAL BYPASS SURGERY  10-3-2023    ASCENDING AORTIC ANEURYSM REPAIR W/ MECHANICAL AORTIC VALVE REPLACEMENT N/A 10/03/2023    Procedure: INTRAOPERATIVE RAYRAY; STERNOTOMY; CORONARY ARTERY BYPASS GRAFT TIMES ONE USING LEFT INTERNAL MAMMARY ARTERY; AORTIC VALVE REPLACEMENT; NEURO MONITORING AND PRP.;  Surgeon: Benton Mcduffie MD;  Location: [facility];  Service: Cardiothoracic;  Laterality: N/A;    CARDIAC CATHETERIZATION N/A 08/08/2023    Procedure: Right Heart Cath;  Surgeon: Kun Desai MD;  Location:  [facility];  Service: Cardiovascular;  Laterality: N/A;    CARDIAC CATHETERIZATION N/A 08/08/2023    Procedure: Coronary angiography;  Surgeon: Kun Desai MD;  Location:  [facility];  Service: Cardiovascular;  Laterality: N/A;    CARDIAC VALVE REPLACEMENT  10-3-2023    These questions seem repetitive    CARPAL TUNNEL RELEASE Left 05/2022    CATARACT EXTRACTION Right 12/2024    CATARACT EXTRACTION Left 11/2024    COLONOSCOPY  2007    Dr Francis --WNL.  March 2015â€“Dr. Hernandez€“colon polyps (2); diverticulosis.    COLONOSCOPY  2014?    polyps--DR Francis    CORONARY ARTERY BYPASS GRAFT  10-3-2023    HERNIA REPAIR      (R) REPAIR TWICE    TRICUSPID VALVE REPLACEMENT  [id number]     "TRICUSPID VALVE SURGERY  10-3-2023       Social History:  Social History     Socioeconomic History    Marital status:      Spouse name: Mia    Number of children: 2   Tobacco Use    Smoking status: Never     Passive exposure: Never    Smokeless tobacco: Never    Tobacco comments:     Never Smoked   Vaping Use    Vaping status: Never Used   Substance and Sexual Activity    Alcohol use: Never    Drug use: Never    Sexual activity: Yes     Partners: Female     Birth control/protection: Post-menopausal       Family History:  Family History   Problem Relation Age of Onset    Hypertension Mother         passed at 87    Heart attack Father 86        My Father passed due to heart failure    Heart disease Father     Diabetes type II Sister     Asthma Brother          of covid complications    Coronary artery disease Brother     No Known Problems Brother     Diabetes Maternal Grandfather     Dementia Neg Hx     Colon cancer Neg Hx     Prostate cancer Neg Hx     Malig Hyperthermia Neg Hx          PATIENT ASSESSMENT     Vitals:  Vitals:    25   BP: 164/92   Pulse: 60   Temp: 97.1 °F (36.2 °C)   SpO2: 96%   Weight: 79.4 kg (175 lb)   Height: 177.8 cm (70\")     Pain Score    25   PainSc: 0-No pain      BP Readings from Last 3 Encounters:   25 164/92   25 134/81   25 161/80     Wt Readings from Last 3 Encounters:   25 79.4 kg (175 lb)   25 78 kg (171 lb 15.3 oz)   25 78 kg (172 lb)      Body mass index is 25.11 kg/m².     Review of Systems:    Review of Systems  Per HPI     Physical Exam:    Physical Exam  General: No acute distress.   Psych: Normal thought and judgment.  Normal affect and mood.    Neck: No palpable mass    No carotid bruit   Cardiovascular Rate: normal. Rhythm: regular. Heart sounds: systolic murmur.  Pulm/Chest: Effort normal, breath sounds normal.   No lower extremity edema     Reviewed Data:    Labs:   Lab Results   Component Value Date    "  09/18/2024    K 4.9 09/18/2024    CALCIUM 9.9 09/18/2024    AST 22 06/11/2024    ALT 17 06/11/2024    BUN 19 09/18/2024    CREATININE 1.05 09/18/2024    CREATININE 1.05 06/11/2024    CREATININE 0.93 10/07/2023    EGFRIFNONA 72 06/18/2021    EGFRIFAFRI 87 06/18/2021     Lab Results   Component Value Date    HGBA1C 6.20 (H) 09/18/2024    HGBA1C 6.20 (H) 06/11/2024    HGBA1C 6.10 (H) 12/11/2023    MICROALBUR 3.1 08/29/2019     Lab Results   Component Value Date    LDL 67 06/11/2024    LDL 58 09/29/2023    LDL 79 05/09/2023    HDL 48 06/11/2024    TRIG 109 06/11/2024    CHOLHDLRATIO 2.81 06/11/2024     Lab Results   Component Value Date    TSH 1.220 06/18/2021    FREET4 1.04 06/18/2021     Lab Results   Component Value Date    WBC 6.23 10/07/2023    HGB 10.7 (L) 10/07/2023    HGB 10.8 (L) 10/06/2023    HGB 10.2 (L) 10/05/2023     10/07/2023     Lab Results   Component Value Date    PSA 2.460 03/12/2024    PSA 4.350 (H) 12/11/2023    PSA 1.3 06/01/2022       Imaging:                Medical Tests:                Summary of old records / correspondence / consultant report:               Request outside records:             SCREENING ASSESSMENT      Screening for Glaucoma:  Previous screening for glaucoma?: Yes (within 2 years)    Hearing Loss Screen:  Finger Rub Hearing Test (right ear): Passed  Finger Rub Hearing Test (left ear): Passed    Urinary Incontinence Screen:  Episodes of urinary incontinence? :   NO    Depression Screen:    PHQ-2 Depression Screening  Little interest or pleasure in doing things? Not at all   Feeling down, depressed, or hopeless? Not at all   PHQ-2 Total Score 0           6/11/2025     9:09 AM   PHQ-2/PHQ-9 Depression Screening   Little interest or pleasure in doing things Not at all   Feeling down, depressed, or hopeless Not at all   How difficult have these problems made it for you to do your work, take care of things at home, or get along with other people? Not difficult at all        PHQ-2 Total Score: 0   PHQ-2: 0 (Not depressed)    PHQ-9: 0 (Negative screening for depression)     FUNCTIONAL, FALL RISK, & COGNITIVE SCREENING     A) Functional and cognitive status based on patient responses:        6/4/2025     8:47 AM   Functional & Cognitive Status   Do you have difficulty preparing food and eating? No   Do you have difficulty bathing yourself, getting dressed or grooming yourself? No   Do you have difficulty using the toilet? No   Do you have difficulty moving around from place to place? No   Do you have trouble with steps or getting out of a bed or a chair? No   Current Diet Well Balanced Diet   Dental Exam Up to date   Eye Exam Up to date   Exercise (times per week) 7 times per week   Current Exercises Include Gardening;Walking;Yard Work   Do you need help using the phone?  No   Are you deaf or do you have serious difficulty hearing?  No   Do you need help to go to places out of walking distance? No   Do you need help shopping? No   Do you need help preparing meals?  No   Do you need help with housework?  No   Do you need help with laundry? No   Do you need help taking your medications? No   Do you need help managing money? No   Do you ever drive or ride in a car without wearing a seat belt? No   Have you felt unusual stress, anger or loneliness in the last month? No   Who do you live with? Spouse   If you need help, do you have trouble finding someone available to you? No   Have you been bothered in the last four weeks by sexual problems? No   Do you have difficulty concentrating, remembering or making decisions? No       B) Assessment of Fall Risk:    Fall Risk Assessment was completed, and patient is at LOW (AVERAGE) RISK risk for falls.    Need for further evaluation of gait, strength, and balance? : NO    Timed Up and Go (TUG): 8 seconds   (>= 12 seconds indicates high risk for falling)    Observable abnormalities included:   Normal balance and gait pattern    C. Assessment of  Cognitive Function:    Mini-Cog Test:     1) Registration (3 objects): YES   2) Clock Draw: Passed? : Yes   3) Number of objects recalled: 2 (MA) and 3 (PROVIDER)     FURTHER EVALUATION REQUIRED?:   Mild age related change noted without evidence of significant cognitive impairment  Will need to monitor closely      **OVERALL ASSESSMENT OF FUNCTIONAL ABILITY**  (Assessment of ability to perform ADL's (showering/bathing, using toilet, dressing, feeding self, moving self around) and IADL's (use telephone, shop, prepare food, housekeep, do laundry, transport independently, take medications independently, and handle finances)    DEGREE OF FUNCTIONAL IMPAIRMENT:   NONE (based on above assessment)    ABILITY TO LIVE INDEPENDENTLY:   Capable of living independently     COUNSELING     A. Identification of Health Risk Factors:    Risk factors include:   cardiovascular risk factors    B. Age-Appropriate Screening Schedule:  (Refer to the list below for future screening recommendations based on patient's age, sex and/or medical conditions. Orders for these recommended tests are listed in the plan section. The patient has been provided with a written plan)    Health Maintenance Topics  Health Maintenance   Topic Date Due    ANNUAL WELLNESS VISIT  12/11/2024    PROSTATE CANCER SCREENING  03/12/2025    HEMOGLOBIN A1C  03/18/2025    LIPID PANEL  06/11/2025    INFLUENZA VACCINE  07/01/2025    COLORECTAL CANCER SCREENING  01/13/2027    TDAP/TD VACCINES (2 - Td or Tdap) 09/18/2034    HEPATITIS C SCREENING  Completed    RSV Vaccine - Adults  Completed    Pneumococcal Vaccine 50+  Completed    ZOSTER VACCINE  Completed    COVID-19 Vaccine  Discontinued    DIABETIC FOOT EXAM  Discontinued    DIABETIC EYE EXAM  Discontinued    URINE MICROALBUMIN-CREATININE RATIO (uACR)  Discontinued       Health Maintenance Topics Due or Over-Due  Health Maintenance Due   Topic Date Due    ANNUAL WELLNESS VISIT  12/11/2024    PROSTATE CANCER SCREENING   03/12/2025    HEMOGLOBIN A1C  03/18/2025    LIPID PANEL  06/11/2025         C. Advanced Care Planning:    Advance Care Planning   ACP discussion was held with the patient during this visit. Patient does not have an advance directive, information provided.       D. Patient Self-Management and Personalized Health Advice:    He has been provided with PERSONALIZED COUNSELING/INFORMATION (AVS educational information) about:     optimizing diet/nutrition plans  improving exercise / conditioning  weight management  reducing risk for cardiovascular disease (heart, stroke, vascular)  reducing risk for cardiac/vascular events with pre-existing disease  designing advance directives  designating POA      He has been recommended for the following PREVENTATIVE SERVICES which has been performed today, will be ordered today or ordered/performed on upcoming follow-up visit:     LIFESTYLE PREVENTATIVE MEASURES  NUTRITION counseling provided  EXERCISE counseling provided  EYE exam for GLAUCOMA screening recommended annually (or follow-up regularly with eye care specialist for active glaucoma history)  CARDIOVASCULAR disease risk reduction counseling performed  FALL RISK assessment / plan of care completed  URINARY incontinence assessment done    CARDIOVASCULAR SCREENING  Has established history of CV disease    CANCER SCREENING  COLORECTAL cancer: Colonoscopy/Cologuard discussed   SKIN CANCER: recommended regular self exam and use of sunscreen   PROSTATE CANCER: (discussed and PSA will be performed annually)    MISC SCREENING  DIABETES screening performed (current/reviewed labs/lab ordered)    VACCINATION/IMMUNIZATION  Immunization History   Administered Date(s) Administered    Arexvy (RSV, Adults 60+ yrs) 09/18/2024    COVID-19 (PFIZER) Purple Cap Monovalent 03/10/2021, 03/31/2021, 12/07/2021    Fluad Quad 65+ 10/10/2020    Fluzone High-Dose 65+YRS 10/21/2019, 10/10/2020, 09/18/2024    Fluzone High-Dose 65+yrs 11/29/2021, 12/09/2022,  12/11/2023    Pneumococcal Conjugate 20-Valent (PCV20) 12/09/2022    Pneumococcal Polysaccharide (PPSV23) 11/29/2021    Shingrix 05/08/2018, 08/21/2018    Tdap 09/18/2024     HEPATITIS A administered/recommended/discussed      E. Miscellaneous Items:    Aspirin use counseling: Taking ASA appropriately as indicated    Discussed BMI with him. The BMI is in the acceptable range    Reviewed use of high risk medication in the elderly: YES (but no high risk medication(s) were identified and counseling provided    Reviewed for potential of harmful drug interactions in the elderly: YES (but no significant interactions were identified      WRAP UP     ASSESSMENT & PLAN:    1) MEDICARE ANNUAL WELLNESS VISIT    2) OTHER MEDICAL CONDITIONS ADDRESSED TODAY:            Problem List Items Addressed This Visit          High    Primary hypertension (Chronic)    Overview   Continue losartan 50 mg daily          Current Assessment & Plan   BP Readings from Last 3 Encounters:   06/11/25 164/92   05/06/25 134/81   02/04/25 161/80      Blood pressure is high today but reportedly lower at home.   Check blood pressure twice daily x 2 weeks and send for review.   Maintain low sodium diet of < 2500 mg / day.   Continue losartan 50 mg daily for now.         Relevant Medications    metoprolol tartrate (LOPRESSOR) 50 MG tablet    losartan (COZAAR) 50 MG tablet    Other Relevant Orders    Comprehensive Metabolic Panel    CBC & Differential       Medium    Prediabetes (Chronic)    Overview   Continue metformin  mg daily         Current Assessment & Plan   Patient has been erroneously marked as diabetic. Based on the available clinical information, he does not have diabetes and should therefore be excluded from diabetic health maintenance and quality measures for the remainder of the reporting period.     Check lab. Maintain a low sugar/starch/carbohydrate diet. Continue metformin  mg daily for now.     Lab Results   Component Value  Date    HGBA1C 6.20 (H) 09/18/2024    HGBA1C 6.20 (H) 06/11/2024    HGBA1C 6.10 (H) 12/11/2023             Relevant Medications    metFORMIN ER (GLUCOPHAGE-XR) 500 MG 24 hr tablet    Other Relevant Orders    Comprehensive Metabolic Panel    Hemoglobin A1c    Hyperlipidemia (Chronic)    Overview   Continue atorvastatin 10 mg and ezetimibe 10 mg daily          Relevant Medications    atorvastatin (LIPITOR) 40 MG tablet    ezetimibe (ZETIA) 10 MG tablet    Other Relevant Orders    Comprehensive Metabolic Panel    Lipid Panel With / Chol / HDL Ratio       Low    S/P AVR (aortic valve replacement) (Chronic)    Overview   - bicuspid AV with severe stenosis  - 10/2023: s/p AVR / CABG x 1 ([facility])    Continue follow-up with cardiology          Other Visit Diagnoses         Medicare annual wellness visit, subsequent    -  Primary      Screening PSA (prostate specific antigen)        Relevant Orders    PSA Screen    Urinalysis With Microscopic If Indicated (No Culture) - Urine, Clean Catch                    Orders Placed This Encounter   Procedures    Comprehensive Metabolic Panel     Release to patient:   Routine Release [[id number]]    Lipid Panel With / Chol / HDL Ratio     Release to patient:   Routine Release [[id number]]    Hemoglobin A1c     Release to patient:   Routine Release [[id number]]    PSA Screen     Release to patient:   Routine Release [[id number]]    Urinalysis With Microscopic If Indicated (No Culture) - Urine, Clean Catch     Release to patient:   Routine Release [[id number]]    CBC & Differential     Manual Differential:   No     Release to patient:   Routine Release [[id number]]        Discussion / Summary:        Medications as of TODAY:              Current Outpatient Medications   Medication Sig Dispense Refill    aspirin 81 MG chewable tablet Chew 1 tablet Daily.      atorvastatin (LIPITOR) 40 MG tablet TAKE 1 TABLET BY MOUTH AT NIGHT 90 tablet 1    ezetimibe (ZETIA) 10 MG tablet TAKE 1 TABLET BY  MOUTH DAILY 90 tablet 1    losartan (COZAAR) 50 MG tablet TAKE 1 TABLET BY MOUTH DAILY 90 tablet 1    metFORMIN ER (GLUCOPHAGE-XR) 500 MG 24 hr tablet TAKE 1 TABLET BY MOUTH WITH  DINNER 90 tablet 1    metoprolol tartrate (LOPRESSOR) 50 MG tablet TAKE 1 TABLET BY MOUTH EVERY 12  HOURS 180 tablet 3     No current facility-administered medications for this visit.     Facility-Administered Medications Ordered in Other Visits   Medication Dose Route Frequency Provider Last Rate Last Admin    Chlorhexidine Gluconate Cloth 2 % pads 1 application   1 application  Topical Q12H PRN Rosa M Roy, APRMELIZA             FOLLOW-UP:            Return for Reassess chronic medical problems, **SCHEDULE COMB AWV/MED FU FOR NEXT YR (30 MIN)**.              Future Appointments   Date Time Provider Department Center   11/10/2025 10:30 AM Nash Grey MD [id number]   2/24/2026 10:00 AM Jyothi Whitley APRN [facility]   6/12/2026  9:45 AM Jhon Alvarez MD [facility]         After Visit Summary (AVS) including the Personalized Prevention  Plan Services (PPPS) was either printed and given to the patient at check-out today and/or sent to [facility] for review.

## 2025-06-23 RX ORDER — METOPROLOL TARTRATE 50 MG
50 TABLET ORAL EVERY 12 HOURS
Qty: 180 TABLET | Refills: 1 | Status: SHIPPED | OUTPATIENT
Start: 2025-06-23

## 2025-07-30 DIAGNOSIS — E78.2 MIXED HYPERLIPIDEMIA: ICD-10-CM

## 2025-07-30 RX ORDER — EZETIMIBE 10 MG/1
10 TABLET ORAL DAILY
Qty: 90 TABLET | Refills: 1 | Status: SHIPPED | OUTPATIENT
Start: 2025-07-30

## 2025-07-30 RX ORDER — ATORVASTATIN CALCIUM 40 MG/1
40 TABLET, FILM COATED ORAL
Qty: 90 TABLET | Refills: 1 | Status: SHIPPED | OUTPATIENT
Start: 2025-07-30

## (undated) DEVICE — ST. SORBAVIEW ULTIMATE IJ SYSTEM A,C: Brand: CENTURION

## (undated) DEVICE — SUT SILK 2/0 TIES 18IN A185H

## (undated) DEVICE — SENSR CERBRL O2 PK/2

## (undated) DEVICE — DRP SLUSH WARMR MACH RECTG 66X44IN

## (undated) DEVICE — CANN ART EOPA 3D NV W/CONN 20F

## (undated) DEVICE — GLV SURG BIOGEL LTX PF 7 1/2

## (undated) DEVICE — BG TRANSF W/COUPLER SPK 600ML

## (undated) DEVICE — BNDG ELAS ELITE V/CLOSE 4IN 5YD LF STRL

## (undated) DEVICE — DRSNG WND BORDR/ADHS NONADHR/GZ LF 4X14IN STRL

## (undated) DEVICE — PK HEART OPN 40

## (undated) DEVICE — SUT PROLN 5/0 V5 36IN 8934H

## (undated) DEVICE — GLIDESHEATH BASIC HYDROPHILIC COATED INTRODUCER SHEATH: Brand: GLIDESHEATH

## (undated) DEVICE — DECANTER BAG 9": Brand: MEDLINE INDUSTRIES, INC.

## (undated) DEVICE — Device

## (undated) DEVICE — 28 FR RIGHT ANGLE – SOFT PVC CATHETER: Brand: PVC THORACIC CATHETERS

## (undated) DEVICE — CANN RETRGR STYLET RSCP 15F

## (undated) DEVICE — SPNG GZ WOVN 4X4IN 12PLY 10/BX STRL

## (undated) DEVICE — SUT SILK 2 SUTUPAK TIE 60IN SA8H 2STRAND

## (undated) DEVICE — SOL IRR NACL 0.9PCT BT 1000ML

## (undated) DEVICE — SUT SILK 0 CT1 CR8 18IN C021D

## (undated) DEVICE — LP VESL MAXI 2.5X1MM RED 2PK

## (undated) DEVICE — BNDG ELAS ELITE V/CLOSE 6IN 5YD LF STRL

## (undated) DEVICE — CORONARY ARTERY BYPASS GRAFT MARKERS, STAINLESS STEEL, DISTAL, WITHOUT HOLDER: Brand: ANASTOMARK CORONARY ARTERY BYPASS GRAFT MARKERS, STAINLESS STEEL, DISTAL

## (undated) DEVICE — OPTIFOAM GENTLE SA, POSTOP, 4X12: Brand: MEDLINE

## (undated) DEVICE — SUT SILK 2/0 SH CR8 18IN CR8 C012D

## (undated) DEVICE — 8 FOOT DISPOSABLE EXTENSION CABLE WITH SAFE CONNECT / ALLIGATOR CLIP

## (undated) DEVICE — DRSNG SURESITE WNDW 2.38X2.75

## (undated) DEVICE — TBG INSUFFLATION LUER LOCK: Brand: MEDLINE INDUSTRIES, INC.

## (undated) DEVICE — CATH DIAG IMPULSE FL4 5F 100CM

## (undated) DEVICE — HEMOCONCENTRATOR PERFUS LPS06

## (undated) DEVICE — CANN AORT ROOT DLP VNT/8IN 14G 7F

## (undated) DEVICE — SOL NACL 0.9PCT 1000ML

## (undated) DEVICE — DRSNG SURESITE WNDW 4X4.5

## (undated) DEVICE — TOWEL,OR,DSP,ST,BLUE,STD,4/PK,20PK/CS: Brand: MEDLINE

## (undated) DEVICE — PK ATS CUST W CARDIOTOMY RESEVOIR

## (undated) DEVICE — TTL1LYR 16FR10ML 100%SIL TMPST TR: Brand: MEDLINE

## (undated) DEVICE — ADAPT ANTEGRADE RETRGR

## (undated) DEVICE — AIRLIFE™ HCH HYGROSCOPIC CONDENSER HUMIDFIER: Brand: AIRLIFE™

## (undated) DEVICE — ORGANIZER SUT SHELIGH 3T 213013

## (undated) DEVICE — CVR PROB 96IN LF STRL

## (undated) DEVICE — GLIDESHEATH SLENDER STAINLESS STEEL KIT: Brand: GLIDESHEATH SLENDER

## (undated) DEVICE — SYS VASOVIEW HEMOPRO ENDOSCOPIC HARVST VESL

## (undated) DEVICE — PREP SOL POVIDONE/IODINE BT 4OZ

## (undated) DEVICE — SPONGE,DISSECTOR,K,XRAY,9/16"X1/4",STRL: Brand: MEDLINE

## (undated) DEVICE — SUT PROLN 4/0 V5 36IN 8935H

## (undated) DEVICE — SUT PROLN 4/0 BB D/A 36IN 8581H

## (undated) DEVICE — 28 FR STRAIGHT – SOFT PVC CATHETER: Brand: PVC THORACIC CATHETERS

## (undated) DEVICE — BLOWER/MISTER AXIOUS OPCAB W/TBG

## (undated) DEVICE — MARKR SKIN W/RULR AND LBL

## (undated) DEVICE — SUT PROLN 3/0 V7 D/A 36IN 8976H

## (undated) DEVICE — PK PERFUS CUST W/CARDIOPLEGIA

## (undated) DEVICE — TBG ART PRESS 60 IN

## (undated) DEVICE — ST TOURNI COMPL A/ 7IN

## (undated) DEVICE — CONTAINER,SPECIMEN,OR STERILE,4OZ: Brand: MEDLINE

## (undated) DEVICE — CANN VESL FREE FLO 2MM

## (undated) DEVICE — SOL IRR RNG BTL 1000ML

## (undated) DEVICE — SUT PROLN 6/0 RB2 D/A 30IN 8711H

## (undated) DEVICE — PK CATH CARD 40

## (undated) DEVICE — DRSNG WND GZ PAD BORDERED 4X8IN STRL

## (undated) DEVICE — BALN PRESS WEDGE 5F 110CM

## (undated) DEVICE — KT MANIFLD CARDIAC

## (undated) DEVICE — RADIFOCUS OPTITORQUE ANGIOGRAPHIC CATHETER: Brand: OPTITORQUE

## (undated) DEVICE — SYS PERFUS SEP PLATLT W TIPS CUST

## (undated) DEVICE — SOL ISO/ALC 70PCT 4OZ

## (undated) DEVICE — OASIS DRAIN, SINGLE, INLINE & ATS COMPATIBLE: Brand: OASIS

## (undated) DEVICE — CANN AORT ROOT DLP VNT 14G 7F

## (undated) DEVICE — CLAMP INSERT: Brand: STEALTH® CLAMP INSERT

## (undated) DEVICE — GW EMR FIX EXCHG J STD .035 3MM 260CM